# Patient Record
Sex: FEMALE | Race: WHITE | Employment: FULL TIME | URBAN - METROPOLITAN AREA
[De-identification: names, ages, dates, MRNs, and addresses within clinical notes are randomized per-mention and may not be internally consistent; named-entity substitution may affect disease eponyms.]

---

## 2023-02-10 ENCOUNTER — HOSPITAL ENCOUNTER (EMERGENCY)
Age: 53
Discharge: HOME | End: 2023-02-10
Attending: EMERGENCY MEDICINE
Payer: COMMERCIAL

## 2023-02-10 VITALS
HEIGHT: 68 IN | RESPIRATION RATE: 20 BRPM | TEMPERATURE: 97 F | OXYGEN SATURATION: 95 % | BODY MASS INDEX: 37.89 KG/M2 | HEART RATE: 80 BPM | WEIGHT: 250 LBS | SYSTOLIC BLOOD PRESSURE: 168 MMHG | DIASTOLIC BLOOD PRESSURE: 83 MMHG

## 2023-02-10 DIAGNOSIS — J01.10 ACUTE FRONTAL SINUSITIS, RECURRENCE NOT SPECIFIED: Primary | ICD-10-CM

## 2023-02-10 PROCEDURE — 99284 EMERGENCY DEPT VISIT MOD MDM: CPT

## 2023-02-10 PROCEDURE — 99283 EMERGENCY DEPT VISIT LOW MDM: CPT

## 2023-02-10 RX ORDER — AMOXICILLIN AND CLAVULANATE POTASSIUM 875; 125 MG/1; MG/1
1 TABLET, FILM COATED ORAL 2 TIMES DAILY
Qty: 20 TABLET | Refills: 0 | Status: SHIPPED | OUTPATIENT
Start: 2023-02-10 | End: 2023-02-20

## 2023-02-10 RX ORDER — METHYLPREDNISOLONE 4 MG/1
TABLET ORAL
Qty: 1 EACH | Refills: 0 | Status: SHIPPED | OUTPATIENT
Start: 2023-02-10

## 2023-02-10 NOTE — ED INITIAL ASSESSMENT (HPI)
PT to the ED for evaluation of sinus pain, pressure, congestion for the last 2 months. PT States she has now developed R ear pain as well.

## 2023-08-09 ENCOUNTER — APPOINTMENT (OUTPATIENT)
Dept: GENERAL RADIOLOGY | Age: 53
End: 2023-08-09
Attending: EMERGENCY MEDICINE
Payer: MEDICAID

## 2023-08-09 ENCOUNTER — HOSPITAL ENCOUNTER (EMERGENCY)
Age: 53
Discharge: HOME | End: 2023-08-09
Attending: EMERGENCY MEDICINE
Payer: MEDICAID

## 2023-08-09 VITALS
WEIGHT: 250 LBS | BODY MASS INDEX: 37.03 KG/M2 | HEIGHT: 69 IN | SYSTOLIC BLOOD PRESSURE: 149 MMHG | HEART RATE: 96 BPM | OXYGEN SATURATION: 97 % | DIASTOLIC BLOOD PRESSURE: 78 MMHG | TEMPERATURE: 98 F | RESPIRATION RATE: 18 BRPM

## 2023-08-09 DIAGNOSIS — M65.312 TRIGGER FINGER OF LEFT THUMB: Primary | ICD-10-CM

## 2023-08-09 PROCEDURE — 99283 EMERGENCY DEPT VISIT LOW MDM: CPT

## 2023-08-09 PROCEDURE — 99284 EMERGENCY DEPT VISIT MOD MDM: CPT

## 2023-08-09 PROCEDURE — 73130 X-RAY EXAM OF HAND: CPT | Performed by: EMERGENCY MEDICINE

## 2023-08-10 NOTE — DISCHARGE INSTRUCTIONS
Use a Velcro wrist splint follow-up with orthopedic hand surgery of next couple weeks use anti-inflammatory medicine such as Advil Aleve or topical diclofenac

## 2023-08-10 NOTE — ED INITIAL ASSESSMENT (HPI)
Patient with left thumb injury for the past \"couple of weeks\". Patient thinks it happened while carrying water. Patient describes pain that has been getting worse and feels like it is \"getting stuck\".

## 2024-01-21 ENCOUNTER — HOSPITAL ENCOUNTER (OUTPATIENT)
Facility: HOSPITAL | Age: 54
Setting detail: OBSERVATION
Discharge: HOME | End: 2024-01-24
Attending: EMERGENCY MEDICINE | Admitting: HOSPITALIST
Payer: MEDICAID

## 2024-01-21 DIAGNOSIS — N13.2 HYDRONEPHROSIS WITH URINARY OBSTRUCTION DUE TO RENAL CALCULUS: ICD-10-CM

## 2024-01-21 DIAGNOSIS — N12 PYELONEPHRITIS: Primary | ICD-10-CM

## 2024-01-21 DIAGNOSIS — N20.1 URETERAL CALCULI: ICD-10-CM

## 2024-01-21 DIAGNOSIS — D72.829 LEUKOCYTOSIS, UNSPECIFIED TYPE: ICD-10-CM

## 2024-01-21 DIAGNOSIS — N20.0 STAGHORN CALCULUS: ICD-10-CM

## 2024-01-21 RX ORDER — ACETAMINOPHEN 325 MG/1
325 TABLET ORAL EVERY 6 HOURS PRN
COMMUNITY

## 2024-01-21 RX ORDER — KETOROLAC TROMETHAMINE 15 MG/ML
15 INJECTION, SOLUTION INTRAMUSCULAR; INTRAVENOUS ONCE
Status: COMPLETED | OUTPATIENT
Start: 2024-01-21 | End: 2024-01-22

## 2024-01-21 RX ORDER — IBUPROFEN 200 MG
200 TABLET ORAL EVERY 6 HOURS PRN
COMMUNITY

## 2024-01-22 ENCOUNTER — ANESTHESIA (OUTPATIENT)
Dept: SURGERY | Facility: HOSPITAL | Age: 54
End: 2024-01-22
Payer: MEDICAID

## 2024-01-22 ENCOUNTER — APPOINTMENT (OUTPATIENT)
Dept: GENERAL RADIOLOGY | Facility: HOSPITAL | Age: 54
End: 2024-01-22
Attending: UROLOGY
Payer: MEDICAID

## 2024-01-22 ENCOUNTER — ANESTHESIA EVENT (OUTPATIENT)
Dept: SURGERY | Facility: HOSPITAL | Age: 54
End: 2024-01-22
Payer: MEDICAID

## 2024-01-22 ENCOUNTER — APPOINTMENT (OUTPATIENT)
Dept: CT IMAGING | Facility: HOSPITAL | Age: 54
End: 2024-01-22
Attending: EMERGENCY MEDICINE
Payer: MEDICAID

## 2024-01-22 PROBLEM — D72.829 LEUKOCYTOSIS, UNSPECIFIED TYPE: Status: ACTIVE | Noted: 2024-01-22

## 2024-01-22 PROBLEM — N12 PYELONEPHRITIS: Status: ACTIVE | Noted: 2024-01-22

## 2024-01-22 PROBLEM — N20.0 STAGHORN CALCULUS: Status: ACTIVE | Noted: 2024-01-22

## 2024-01-22 PROBLEM — N13.2 HYDRONEPHROSIS WITH URINARY OBSTRUCTION DUE TO RENAL CALCULUS: Status: ACTIVE | Noted: 2024-01-22

## 2024-01-22 LAB
ALBUMIN SERPL-MCNC: 2.1 G/DL (ref 3.4–5)
ALBUMIN/GLOB SERPL: 0.4 {RATIO} (ref 1–2)
ALP LIVER SERPL-CCNC: 92 U/L
ANION GAP SERPL CALC-SCNC: <0 MMOL/L (ref 0–18)
AST SERPL-CCNC: 36 U/L (ref 15–37)
BASOPHILS # BLD AUTO: 0.09 X10(3) UL (ref 0–0.2)
BASOPHILS NFR BLD AUTO: 0.6 %
BILIRUB SERPL-MCNC: 0.4 MG/DL (ref 0.1–2)
BILIRUB UR QL STRIP.AUTO: NEGATIVE
BUN BLD-MCNC: 14 MG/DL (ref 9–23)
CALCIUM BLD-MCNC: 8.8 MG/DL (ref 8.5–10.1)
CHLORIDE SERPL-SCNC: 105 MMOL/L (ref 98–112)
CO2 SERPL-SCNC: 31 MMOL/L (ref 21–32)
COLOR UR AUTO: YELLOW
CREAT BLD-MCNC: 1.09 MG/DL
EGFRCR SERPLBLD CKD-EPI 2021: 60 ML/MIN/1.73M2 (ref 60–?)
EOSINOPHIL # BLD AUTO: 0.04 X10(3) UL (ref 0–0.7)
EOSINOPHIL NFR BLD AUTO: 0.3 %
ERYTHROCYTE [DISTWIDTH] IN BLOOD BY AUTOMATED COUNT: 16 %
EST. AVERAGE GLUCOSE BLD GHB EST-MCNC: 171 MG/DL (ref 68–126)
FLUAV + FLUBV RNA SPEC NAA+PROBE: NEGATIVE
FLUAV + FLUBV RNA SPEC NAA+PROBE: NEGATIVE
GLOBULIN PLAS-MCNC: 5.7 G/DL (ref 2.8–4.4)
GLUCOSE BLD-MCNC: 127 MG/DL (ref 70–99)
GLUCOSE UR STRIP.AUTO-MCNC: NORMAL MG/DL
HBA1C MFR BLD: 7.6 % (ref ?–5.7)
HCT VFR BLD AUTO: 29.8 %
HGB BLD-MCNC: 9.7 G/DL
IMM GRANULOCYTES # BLD AUTO: 0.14 X10(3) UL (ref 0–1)
IMM GRANULOCYTES NFR BLD: 0.9 %
KETONES UR STRIP.AUTO-MCNC: NEGATIVE MG/DL
LACTATE SERPL-SCNC: 0.7 MMOL/L (ref 0.4–2)
LEUKOCYTE ESTERASE UR QL STRIP.AUTO: 500
LYMPHOCYTES # BLD AUTO: 2.07 X10(3) UL (ref 1–4)
LYMPHOCYTES NFR BLD AUTO: 13.4 %
MCH RBC QN AUTO: 27.8 PG (ref 26–34)
MCHC RBC AUTO-ENTMCNC: 32.6 G/DL (ref 31–37)
MCV RBC AUTO: 85.4 FL
MONOCYTES # BLD AUTO: 0.89 X10(3) UL (ref 0.1–1)
MONOCYTES NFR BLD AUTO: 5.8 %
NEUTROPHILS # BLD AUTO: 12.21 X10 (3) UL (ref 1.5–7.7)
NEUTROPHILS # BLD AUTO: 12.21 X10(3) UL (ref 1.5–7.7)
NEUTROPHILS NFR BLD AUTO: 79 %
OSMOLALITY SERPL CALC.SUM OF ELEC: 282 MOSM/KG (ref 275–295)
PH UR STRIP.AUTO: 6.5 [PH] (ref 5–8)
PLATELET # BLD AUTO: 577 10(3)UL (ref 150–450)
POTASSIUM SERPL-SCNC: 4.1 MMOL/L (ref 3.5–5.1)
PROT SERPL-MCNC: 7.8 G/DL (ref 6.4–8.2)
PROT UR STRIP.AUTO-MCNC: 50 MG/DL
RBC # BLD AUTO: 3.49 X10(6)UL
RBC UR QL AUTO: NEGATIVE
RSV RNA SPEC NAA+PROBE: NEGATIVE
SARS-COV-2 RNA RESP QL NAA+PROBE: NOT DETECTED
SODIUM SERPL-SCNC: 135 MMOL/L (ref 136–145)
SP GR UR STRIP.AUTO: 1.02 (ref 1–1.03)
UROBILINOGEN UR STRIP.AUTO-MCNC: 3 MG/DL
WBC # BLD AUTO: 15.4 X10(3) UL (ref 4–11)
WBC #/AREA URNS AUTO: >50 /HPF

## 2024-01-22 PROCEDURE — 0T778DZ DILATION OF LEFT URETER WITH INTRALUMINAL DEVICE, VIA NATURAL OR ARTIFICIAL OPENING ENDOSCOPIC: ICD-10-PCS | Performed by: UROLOGY

## 2024-01-22 PROCEDURE — 74177 CT ABD & PELVIS W/CONTRAST: CPT | Performed by: EMERGENCY MEDICINE

## 2024-01-22 PROCEDURE — 99223 1ST HOSP IP/OBS HIGH 75: CPT | Performed by: HOSPITALIST

## 2024-01-22 PROCEDURE — S0028 INJECTION, FAMOTIDINE, 20 MG: HCPCS | Performed by: ANESTHESIOLOGY

## 2024-01-22 DEVICE — URETERAL STENT
Type: IMPLANTABLE DEVICE | Site: URETER | Status: FUNCTIONAL
Brand: ASCERTA™

## 2024-01-22 DEVICE — URETERAL STENT
Type: IMPLANTABLE DEVICE | Site: URETER | Status: NON-FUNCTIONAL
Brand: ASCERTA™
Removed: 2024-04-15

## 2024-01-22 RX ORDER — SODIUM CHLORIDE 9 MG/ML
INJECTION, SOLUTION INTRAVENOUS CONTINUOUS
Status: DISCONTINUED | OUTPATIENT
Start: 2024-01-22 | End: 2024-01-23

## 2024-01-22 RX ORDER — ENEMA 19; 7 G/133ML; G/133ML
1 ENEMA RECTAL ONCE AS NEEDED
Status: DISCONTINUED | OUTPATIENT
Start: 2024-01-22 | End: 2024-01-24

## 2024-01-22 RX ORDER — PROCHLORPERAZINE EDISYLATE 5 MG/ML
5 INJECTION INTRAMUSCULAR; INTRAVENOUS EVERY 8 HOURS PRN
Status: DISCONTINUED | OUTPATIENT
Start: 2024-01-22 | End: 2024-01-22 | Stop reason: HOSPADM

## 2024-01-22 RX ORDER — SODIUM CHLORIDE, SODIUM LACTATE, POTASSIUM CHLORIDE, CALCIUM CHLORIDE 600; 310; 30; 20 MG/100ML; MG/100ML; MG/100ML; MG/100ML
INJECTION, SOLUTION INTRAVENOUS CONTINUOUS
Status: DISCONTINUED | OUTPATIENT
Start: 2024-01-22 | End: 2024-01-22 | Stop reason: HOSPADM

## 2024-01-22 RX ORDER — NICOTINE 21 MG/24HR
1 PATCH, TRANSDERMAL 24 HOURS TRANSDERMAL DAILY
Status: DISCONTINUED | OUTPATIENT
Start: 2024-01-22 | End: 2024-01-24

## 2024-01-22 RX ORDER — HYDROMORPHONE HYDROCHLORIDE 1 MG/ML
0.6 INJECTION, SOLUTION INTRAMUSCULAR; INTRAVENOUS; SUBCUTANEOUS EVERY 5 MIN PRN
Status: DISCONTINUED | OUTPATIENT
Start: 2024-01-22 | End: 2024-01-22 | Stop reason: HOSPADM

## 2024-01-22 RX ORDER — NALOXONE HYDROCHLORIDE 0.4 MG/ML
80 INJECTION, SOLUTION INTRAMUSCULAR; INTRAVENOUS; SUBCUTANEOUS AS NEEDED
Status: DISCONTINUED | OUTPATIENT
Start: 2024-01-22 | End: 2024-01-22 | Stop reason: HOSPADM

## 2024-01-22 RX ORDER — ONDANSETRON 2 MG/ML
4 INJECTION INTRAMUSCULAR; INTRAVENOUS EVERY 6 HOURS PRN
Status: DISCONTINUED | OUTPATIENT
Start: 2024-01-22 | End: 2024-01-24

## 2024-01-22 RX ORDER — MIDAZOLAM HYDROCHLORIDE 1 MG/ML
1 INJECTION INTRAMUSCULAR; INTRAVENOUS EVERY 5 MIN PRN
Status: DISCONTINUED | OUTPATIENT
Start: 2024-01-22 | End: 2024-01-22 | Stop reason: HOSPADM

## 2024-01-22 RX ORDER — HYDROMORPHONE HYDROCHLORIDE 1 MG/ML
0.4 INJECTION, SOLUTION INTRAMUSCULAR; INTRAVENOUS; SUBCUTANEOUS EVERY 5 MIN PRN
Status: DISCONTINUED | OUTPATIENT
Start: 2024-01-22 | End: 2024-01-22 | Stop reason: HOSPADM

## 2024-01-22 RX ORDER — HYDROMORPHONE HYDROCHLORIDE 1 MG/ML
0.2 INJECTION, SOLUTION INTRAMUSCULAR; INTRAVENOUS; SUBCUTANEOUS EVERY 5 MIN PRN
Status: DISCONTINUED | OUTPATIENT
Start: 2024-01-22 | End: 2024-01-22 | Stop reason: HOSPADM

## 2024-01-22 RX ORDER — POLYETHYLENE GLYCOL 3350 17 G/17G
17 POWDER, FOR SOLUTION ORAL DAILY PRN
Status: DISCONTINUED | OUTPATIENT
Start: 2024-01-22 | End: 2024-01-24

## 2024-01-22 RX ORDER — LIDOCAINE HYDROCHLORIDE 10 MG/ML
INJECTION, SOLUTION EPIDURAL; INFILTRATION; INTRACAUDAL; PERINEURAL AS NEEDED
Status: DISCONTINUED | OUTPATIENT
Start: 2024-01-22 | End: 2024-01-22 | Stop reason: SURG

## 2024-01-22 RX ORDER — ACETAMINOPHEN 500 MG
TABLET ORAL
Status: COMPLETED
Start: 2024-01-22 | End: 2024-01-22

## 2024-01-22 RX ORDER — BISACODYL 10 MG
10 SUPPOSITORY, RECTAL RECTAL
Status: DISCONTINUED | OUTPATIENT
Start: 2024-01-22 | End: 2024-01-24

## 2024-01-22 RX ORDER — FAMOTIDINE 10 MG/ML
INJECTION, SOLUTION INTRAVENOUS AS NEEDED
Status: DISCONTINUED | OUTPATIENT
Start: 2024-01-22 | End: 2024-01-22 | Stop reason: SURG

## 2024-01-22 RX ORDER — LABETALOL HYDROCHLORIDE 5 MG/ML
5 INJECTION, SOLUTION INTRAVENOUS EVERY 5 MIN PRN
Status: DISCONTINUED | OUTPATIENT
Start: 2024-01-22 | End: 2024-01-22 | Stop reason: HOSPADM

## 2024-01-22 RX ORDER — ACETAMINOPHEN 500 MG
500 TABLET ORAL EVERY 4 HOURS PRN
Status: DISCONTINUED | OUTPATIENT
Start: 2024-01-22 | End: 2024-01-24

## 2024-01-22 RX ORDER — TRAMADOL HYDROCHLORIDE 50 MG/1
50 TABLET ORAL EVERY 6 HOURS PRN
Status: DISCONTINUED | OUTPATIENT
Start: 2024-01-22 | End: 2024-01-24

## 2024-01-22 RX ORDER — ONDANSETRON 2 MG/ML
4 INJECTION INTRAMUSCULAR; INTRAVENOUS ONCE
Status: COMPLETED | OUTPATIENT
Start: 2024-01-22 | End: 2024-01-22

## 2024-01-22 RX ORDER — HYDROCODONE BITARTRATE AND ACETAMINOPHEN 10; 325 MG/1; MG/1
2 TABLET ORAL ONCE AS NEEDED
Status: COMPLETED | OUTPATIENT
Start: 2024-01-22 | End: 2024-01-22

## 2024-01-22 RX ORDER — KETOROLAC TROMETHAMINE 30 MG/ML
INJECTION, SOLUTION INTRAMUSCULAR; INTRAVENOUS AS NEEDED
Status: DISCONTINUED | OUTPATIENT
Start: 2024-01-22 | End: 2024-01-22 | Stop reason: SURG

## 2024-01-22 RX ORDER — HYDROCODONE BITARTRATE AND ACETAMINOPHEN 10; 325 MG/1; MG/1
1 TABLET ORAL ONCE AS NEEDED
Status: COMPLETED | OUTPATIENT
Start: 2024-01-22 | End: 2024-01-22

## 2024-01-22 RX ORDER — PROCHLORPERAZINE EDISYLATE 5 MG/ML
5 INJECTION INTRAMUSCULAR; INTRAVENOUS EVERY 8 HOURS PRN
Status: DISCONTINUED | OUTPATIENT
Start: 2024-01-22 | End: 2024-01-24

## 2024-01-22 RX ORDER — ONDANSETRON 2 MG/ML
INJECTION INTRAMUSCULAR; INTRAVENOUS AS NEEDED
Status: DISCONTINUED | OUTPATIENT
Start: 2024-01-22 | End: 2024-01-22 | Stop reason: SURG

## 2024-01-22 RX ORDER — MEPERIDINE HYDROCHLORIDE 25 MG/ML
12.5 INJECTION INTRAMUSCULAR; INTRAVENOUS; SUBCUTANEOUS AS NEEDED
Status: DISCONTINUED | OUTPATIENT
Start: 2024-01-22 | End: 2024-01-22 | Stop reason: HOSPADM

## 2024-01-22 RX ORDER — ONDANSETRON 2 MG/ML
4 INJECTION INTRAMUSCULAR; INTRAVENOUS EVERY 6 HOURS PRN
Status: DISCONTINUED | OUTPATIENT
Start: 2024-01-22 | End: 2024-01-22 | Stop reason: HOSPADM

## 2024-01-22 RX ORDER — DEXAMETHASONE SODIUM PHOSPHATE 4 MG/ML
VIAL (ML) INJECTION AS NEEDED
Status: DISCONTINUED | OUTPATIENT
Start: 2024-01-22 | End: 2024-01-22 | Stop reason: SURG

## 2024-01-22 RX ORDER — IOHEXOL 350 MG/ML
100 INJECTION, SOLUTION INTRAVENOUS
Status: COMPLETED | OUTPATIENT
Start: 2024-01-22 | End: 2024-01-22

## 2024-01-22 RX ORDER — SENNOSIDES 8.6 MG
17.2 TABLET ORAL NIGHTLY PRN
Status: DISCONTINUED | OUTPATIENT
Start: 2024-01-22 | End: 2024-01-24

## 2024-01-22 RX ORDER — ACETAMINOPHEN 500 MG
1000 TABLET ORAL ONCE AS NEEDED
Status: COMPLETED | OUTPATIENT
Start: 2024-01-22 | End: 2024-01-22

## 2024-01-22 RX ADMIN — ONDANSETRON 4 MG: 2 INJECTION INTRAMUSCULAR; INTRAVENOUS at 21:30:00

## 2024-01-22 RX ADMIN — FAMOTIDINE 20 MG: 10 INJECTION, SOLUTION INTRAVENOUS at 21:30:00

## 2024-01-22 RX ADMIN — KETOROLAC TROMETHAMINE 30 MG: 30 INJECTION, SOLUTION INTRAMUSCULAR; INTRAVENOUS at 21:57:00

## 2024-01-22 RX ADMIN — LIDOCAINE HYDROCHLORIDE 50 MG: 10 INJECTION, SOLUTION EPIDURAL; INFILTRATION; INTRACAUDAL; PERINEURAL at 21:30:00

## 2024-01-22 RX ADMIN — DEXAMETHASONE SODIUM PHOSPHATE 4 MG: 4 MG/ML VIAL (ML) INJECTION at 21:30:00

## 2024-01-22 RX ADMIN — SODIUM CHLORIDE: 9 INJECTION, SOLUTION INTRAVENOUS at 22:09:00

## 2024-01-22 NOTE — ED INITIAL ASSESSMENT (HPI)
Patient reports she has been sick for the past 3 weeks.  Thought it was a kidney stone but is now getting rigors   Pain abdominal pain and nausea with dry heaving.     decreased step length/decreased weight-shifting ability

## 2024-01-22 NOTE — CONSULTS
[facility]  Department of Urology   Consultation Note    Kirstin Myers Patient Status:  Observation    3/24/1970 MRN [id number]   Location [facility] 0SW-A Attending Paul Fagan DO   Hosp Day # 0 PCP None Pcp     Reason for Consultation:  FEVER  RIGORS   PYELONEPHRITIS  OBSTRUCTING LEFT RENAL PELVIC STONE EXTENDING UP TO LEFT UPJ    History of Present Illness:  Kirstin Myers is a a(n) 53 year old female who presented with rigors, fever, left flank pain.    Patient reports it has been occurring intermittently over the past 3 weeks.    Has not taken her temp    Labs:  Lactic acid 0.7  WBC 15.4  Hgb 9.7  Platelet count 577  Serum creat 1.09  Serum calcium level 8.8    UA 24: >50 WBC/hpf, 3-5 RBC/hpf, 3+ bacteria, few squamous epithelial cells  Urine culture 24: pending    2/2 blood cultures 24: pending    Abdominal/pelvic CT scan with IV contrast 24:  There is heterogenous appearance of the left kidney with moderate to severe hydronephrosis due to a large obstructing stone in the left renal pelvis extending up to the left UPJ measuring up to 4.3 cm. There is mild to moderate left perinephric stranding.  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  Clinical correlation recommended.  Probable cyst in mid to lower pole left kidney measuring up to 4.3 cm. There are some additional nonobstructing stones in the lower pole left kidney measuring up to 8 mm.     Urology was consulted.    No history of UTI or stone  Son has history of urolithiasis    Daughter at bedside.        History:  History reviewed. No pertinent past medical history.  Past Surgical History:   Procedure Laterality Date          HERNIA SURGERY            REPAIR ING HERNIA,5+Y/O,REDUCIBL      VENTRAL HERNIA REPAIR N/A 2016    Procedure: HERNIA VENTRAL REPAIR;   Surgeon: Vijay Randall MD;  Location:  MAIN OR     Family History   Problem Relation Age of Onset    Cancer Mother     Heart Disease Maternal Grandfather       reports that she has been smoking cigarettes. She has a 30 pack-year smoking history. She has been exposed to tobacco smoke. She does not have any smokeless tobacco history on file. She reports that she does not drink alcohol and does not use drugs.    Allergies:  No Known Allergies    Medications:    Current Facility-Administered Medications:     sodium chloride 0.9% infusion, , Intravenous, Continuous    acetaminophen (Tylenol Extra Strength) tab 500 mg, 500 mg, Oral, Q4H PRN    ondansetron (Zofran) 4 MG/2ML injection 4 mg, 4 mg, Intravenous, Q6H PRN    prochlorperazine (Compazine) 10 MG/2ML injection 5 mg, 5 mg, Intravenous, Q8H PRN    polyethylene glycol (PEG 3350) (Miralax) 17 g oral packet 17 g, 17 g, Oral, Daily PRN    sennosides (Senokot) tab 17.2 mg, 17.2 mg, Oral, Nightly PRN    bisacodyl (Dulcolax) 10 MG rectal suppository 10 mg, 10 mg, Rectal, Daily PRN    fleet enema (Fleet) 7-19 GM/118ML rectal enema 133 mL, 1 enema, Rectal, Once PRN    nicotine (Nicoderm CQ) 21 MG/24HR patch 1 patch, 1 patch, Transdermal, Daily    [START ON 1/23/2024] cefTRIAXone (Rocephin) 2 g in D5W 100 mL IVPB-ADD, 2 g, Intravenous, Q24H    Review of Systems:  Pertinent items are noted in HPI.    Physical Exam:  /57 (BP Location: Left arm)   Pulse 82   Temp 98.6 °F (37 °C) (Oral)   Resp 20   Ht 5' 8\" (1.727 m)   Wt 287 lb 6.4 oz (130.4 kg)   LMP  (LMP Unknown)   SpO2 (!) 82%   BMI 43.70 kg/m²   GENERAL: the patient is resting in bed in no acute distress.    HEENT: Unremarkable.  NECK: Supple.   LUNGS: non-labored respirations.   ABDOMEN:  The abdomen is soft and nontender without rebound or guarding.   BACK: Without CVA tenderness.       Laboratory Data:  Lab Results   Component Value Date    WBC 15.4 01/22/2024    HGB 9.7 01/22/2024    HCT 29.8 01/22/2024     .0 01/22/2024    CREATSERUM 1.09 01/22/2024    BUN 14 01/22/2024     01/22/2024    K 4.1 01/22/2024     01/22/2024    CO2 31.0 01/22/2024     01/22/2024    CA 8.8 01/22/2024    ALB 2.1 01/22/2024    ALKPHO 92 01/22/2024    BILT 0.4 01/22/2024    TP 7.8 01/22/2024    AST 36 01/22/2024    ALT  01/22/2024      Comment:      Due to  backorder we are temporarily unable to offer hospital-based ALT testing at [facility].   If urgently needed, please order ALT test code [id number].   The new order will need a new venipuncture and will be sent to [facility] for testing.   The expected turnaround time will be within 24 hours.        UA 1/22/24: >50 WBC/hpf, 3-5 RBC/hpf, 3+ bacteria, few squamous epithelial cells  Urine culture 1/22/24: pending    2/2 blood cultures 1/22/24: pending     Imaging:  CT ABDOMEN PELVIS IV CONTRAST, NO ORAL (ER)    Result Date: 1/22/2024  CONCLUSION:   1. There is heterogenous appearance of the left kidney with moderate to severe hydronephrosis due to a large obstructing stone in the left renal pelvis extending up to the left UPJ measuring up to 4.3 cm. There is mild to moderate left perinephric stranding.  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  Clinical correlation recommended.   2. Enlarged retroperitoneal lymph nodes measuring up to 3.5 x 1.7 cm in the left periaortic region that may be reactive, with other etiologies not entirely excluded.  Clinical correlation recommended.  PET-CT may also be of further value.  3. Decompressed urinary bladder, with superimposed cystitis not entirely excluded.  Clinical correlation recommended.   4. Additional left nephrolithiasis.  5. Uncomplicated colonic diverticulosis.  6. Hepatomegaly.   Please see above for further details.  A preliminary report was provided by the Scaled Inference service.   LOCATION:  [id number]   Dictated by (CST): Chuck Cast MD on 1/22/2024 at 5:06 AM     Finalized by (CST): Chuck Cast MD on 1/22/2024 at 5:12 AM       Impression:  Patient Active Problem List   Diagnosis    Ventral hernia without obstruction or gangrene    Incarcerated ventral hernia    Pyelonephritis    Leukocytosis, unspecified type    Staghorn calculus    Hydronephrosis with urinary obstruction due to renal calculus     LEFT FLANK PAIN, PYELONEPHRITIS, OBSTRUCTING LEFT RENAL PELVIC STONE EXTENDING UP TO LEFT UPJ (4.3 CM), LEFT UROLITHIASIS   Tmax 99.9 on 1/21, Tmax 99.6 so far today  Lactic acid 0.7  WBC 15.4  Hgb 9.7  Platelet count 577  Serum creat 1.09  Serum calcium level 8.8  UA 1/22/24: >50 WBC/hpf, 3-5 RBC/hpf, 3+ bacteria, few squamous epithelial cells  Urine culture 1/22/24: pending  2/2 blood cultures 1/22/24: pending  CT A/P with IV contrast 1/22/24:  There is heterogenous appearance of the left kidney with moderate to severe hydronephrosis due to a large obstructing stone in the left renal pelvis extending up to the left UPJ measuring up to 4.3 cm. There is mild to moderate left perinephric    stranding.  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  Clinical correlation recommended.  Probable cyst in mid to lower pole left kidney measuring up to 4.3 cm.  There are some additional nonobstructing stones in the lower pole left kidney measuring up to 8 mm.     Recommendations:  Recommend a cystoscopy, left retrograde pyelogram, and insertion of a left ureteral stent. Reviewed risks, benefits, alternatives, and complications of the procedure including but not limited to risk of anesthesia, bladder/ureteral injury, use of nephrostomy tube, bleeding, infection/worsening infection, and ureteral stent related symptoms with the patient who understands and wishes to proceed.  Patient  informed if a ureteral stent was unable to be placed, would recommend a NT placement.  Patient also informed that the ureteral stent is not a permament implant and would eventually need to be removed (timing of stent removal per urologist).    Outpatient renal scan to see how much function the left kidney has.  If renal scan ok - then will need a left PCNL.  If little function then will likely  need a nephrectomy.  Dr. Pena reviewed CT scan - looks like the left kidney still has some good parenchyma.      NPO  Consent to be signed  Check final cultures  Abx per attending  Follow labs, temp  Pain management    Above discussed with patient, daughter, nurse, Dr. Pena.      Thank you for allowing me to participate in the care of your patient.    Catherine Mack PA-C  [facility]  Department of Urology  1/22/2024  9:42 AM

## 2024-01-22 NOTE — ED QUICK NOTES
Orders for admission, patient is aware of plan and ready to go upstairs. Any questions, please call ED RN Tania at extension [phone].     Patient Covid vaccination status: Unvaccinated     COVID Test Ordered in ED: SARS-CoV-2/Flu A and B/RSV by PCR (GeneXpert)    COVID Suspicion at Admission: N/A    Running Infusions:  None    Mental Status/LOC at time of transport: A/O x 4    Other pertinent information:   CIWA score: N/A   NIH score:  N/A

## 2024-01-22 NOTE — PLAN OF CARE
NURSING ADMISSION NOTE      Patient admitted via Wheelchair  Oriented to room.  Safety precautions initiated.  Bed in low position.  Call light in reach.  Received patient from ED via w/c with daughter at bedside. On room air, breath sounds clear. Placed on tele, SR. Noted to have +2 pitting edema in R foot, pt states since fall 1 week ago. Medicated with Tylenol for c/o neck pain. Urology consult to be called. NPO per Dr Lawson for now.

## 2024-01-22 NOTE — H&P
[facility]     Kirstin Myers Patient Status:  Observation    3/24/1970 MRN [id number]   Location [facility] 0SW-A Attending Liane Lawson MD   Hosp Day # 0 PCP None Pcp     Chief Complaint: rigors and fever    Subjective:    History of Present Illness:     Kirstin Myers is a 53 year old female with no known medical history who comes to the ER with complaints of rigors, fevers and left sided flank pain.  She reports it has been intermittently occurring over the past 3 weeks.  Over the past few days it has been more constant.  She did not take her temperature at home but reports she had subjective fevers.  Her pain initially started in her left flank and then started to radiate around to the left side of her abdomen.      History/Other:    Past Medical History:  History reviewed. No pertinent past medical history.  Past Surgical History:   Past Surgical History:   Procedure Laterality Date          HERNIA SURGERY            REPAIR ING HERNIA,5+Y/O,REDUCIBL      VENTRAL HERNIA REPAIR N/A 2016    Procedure: HERNIA VENTRAL REPAIR;  Surgeon: Vijay Randall MD;  Location:  MAIN OR      Family History:   Family History   Problem Relation Age of Onset    Cancer Mother     Heart Disease Maternal Grandfather      Social History:    reports that she has been smoking cigarettes. She has a 30 pack-year smoking history. She has been exposed to tobacco smoke. She does not have any smokeless tobacco history on file. She reports that she does not drink alcohol and does not use drugs.     Allergies: No Known Allergies    Medications:    No current facility-administered medications on file prior to encounter.     Current Outpatient Medications on File Prior to Encounter   Medication Sig Dispense Refill    acetaminophen 325 MG Oral Tab Take 1 tablet (325 mg total) by mouth every 6 (six) hours as needed for Pain.      ibuprofen 200 MG Oral Tab Take 1 tablet (200 mg  total) by mouth every 6 (six) hours as needed for Pain.      Naproxen Sodium (ALEVE OR) Take by mouth.      cetirizine 10 MG Oral Tab Take 1 tablet (10 mg total) by mouth daily.      B Complex Vitamins (B COMPLEX OR) Take by mouth daily.      BIOTIN OR Take by mouth daily.      COLLAGEN OR Take by mouth daily.      Multiple Vitamins-Minerals (MULTIVITAMIN ADULT OR) Take by mouth daily.      Bioflavonoid Products (LISA C OR) Take by mouth daily.      LYSINE OR Take by mouth daily.      Misc Natural Products (OSTEO BI-FLEX ADV JOINT SHIELD) Oral Tab Take by mouth daily.      Digestive Enzymes (PAPAYA AND ENZYMES OR) Take by mouth daily.      Probiotic Product (PROBIOTIC DAILY OR) Take by mouth daily.      methylPREDNISolone (MEDROL) 4 MG Oral Tablet Therapy Pack Dosepack: take as directed 1 each 0    HYDROcodone-acetaminophen 5-325 MG Oral Tab Take 1 tablet by mouth every 4 (four) hours as needed. 30 tablet 0    Fluticasone Propionate 50 MCG/ACT Nasal Suspension 1 spray by Each Nare route daily.      Bromelains (BROMELAIN OR) Take by mouth daily.      CHROMIUM OR Take by mouth daily.      Coenzyme Q10 (COQ10 OR) Take by mouth.      GLUTAMINE OR Take by mouth daily.      Green Tea, Camillia sinensis, (GREEN TEA OR) Take by mouth daily.      Omega-3 Fatty Acids (OMEGA-3 FISH OIL OR) Take by mouth daily.         Review of Systems:   A comprehensive review of systems was completed.    Pertinent positives and negatives noted in the HPI.    Objective:   Physical Exam:    /58 (BP Location: Left arm)   Pulse 77   Temp 99.6 °F (37.6 °C) (Oral)   Resp 22   Ht 5' 8\" (1.727 m)   Wt 287 lb 6.4 oz (130.4 kg)   LMP  (LMP Unknown)   SpO2 94%   BMI 43.70 kg/m²   General: No acute distress, Alert  Respiratory: No rhonchi, no wheezes  Cardiovascular: S1, S2. Regular rate and rhythm  Abdomen: Soft, Non-tender, non-distended, positive bowel sounds  Neuro: No new focal deficits  Extremities: No edema      Results:     Labs:      Labs Last 24 Hours:    Recent Labs   Lab 01/22/24  0001   RBC 3.49*   HGB 9.7*   HCT 29.8*   MCV 85.4   MCH 27.8   MCHC 32.6   RDW 16.0   NEPRELIM 12.21*   WBC 15.4*   .0*       Recent Labs   Lab 01/22/24  0001   *   BUN 14   CREATSERUM 1.09*   EGFRCR 60   CA 8.8   ALB 2.1*   *   K 4.1      CO2 31.0   ALKPHO 92   AST 36   BILT 0.4   TP 7.8       No results found for: \"PT\", \"INR\"    No results for input(s): \"TROP\", \"TROPHS\", \"CK\" in the last 168 hours.    No results for input(s): \"TROP\", \"PBNP\" in the last 168 hours.    No results for input(s): \"PCT\" in the last 168 hours.    Imaging: Imaging data reviewed in Epic.    Assessment & Plan:      #Staghorn calculus  # Acute pyelonephritis  -Abx  -await Cx results  -urology consult    # Hyponatremia  -IVF    # Normocytic anemia  -check Iron studies    # Obesity  -BMI 43    # Tobacco abuse  -Nicotine patch    # Renal insuff  -baseline unknown        Plan of care discussed with patient and daughter, RN and ER physician    Liane Lawson MD    Supplementary Documentation:     The 21st Century Cures Act makes medical notes like these available to patients in the interest of transparency. Please be advised this is a medical document. Medical documents are intended to carry relevant information, facts as evident, and the clinical opinion of the practitioner. The medical note is intended as peer to peer communication and may appear blunt or direct. It is written in medical language and may contain abbreviations or verbiage that are unfamiliar.

## 2024-01-22 NOTE — ED PROVIDER NOTES
Patient Seen in: [facility] Emergency Department      History     Chief Complaint   Patient presents with    Fever     Stated Complaint: couple weeks having rigors, concerned for infection, having fevers    Subjective:   HPI    Patient is a 53-year-old female who states has been sick for the past 3 weeks.  Patient states started having some back pain on the left and got some rigors.  However it went away after few days.  But states started having some diffuse abdominal pain nausea dry heaving.  States also has been going the bathroom more and having low-grade chills.  Thought initially could be a kidney stone but when the pain went away he thought she was better but then the fever started.    Objective:   History reviewed. No pertinent past medical history.           Past Surgical History:   Procedure Laterality Date          HERNIA SURGERY            REPAIR ING HERNIA,5+Y/O,REDUCIBL      VENTRAL HERNIA REPAIR N/A 2016    Procedure: HERNIA VENTRAL REPAIR;  Surgeon: Vijay Randall MD;  Location:  MAIN OR                Social History     Socioeconomic History    Marital status:    Tobacco Use    Smoking status: Every Day     Packs/day: 1.00     Years: 30.00     Additional pack years: 0.00     Total pack years: 30.00     Types: Cigarettes     Passive exposure: Current   Vaping Use    Vaping Use: Some days   Substance and Sexual Activity    Alcohol use: No     Alcohol/week: 0.0 standard drinks of alcohol    Drug use: No              Review of Systems    Positive for stated complaint: couple weeks having rigors, concerned for infection, having fevers  Other systems are as noted in HPI.  Constitutional and vital signs reviewed.      All other systems reviewed and negative except as noted above.    Physical Exam     ED Triage Vitals [24 2335]   /78   Pulse 92   Resp 24   Temp 99.9 °F (37.7 °C)   Temp src Oral   SpO2 92 %   O2 Device None (Room air)       Current:/78    Pulse 92   Temp 99.3 °F (37.4 °C) (Oral)   Resp 24   Ht 172.7 cm (5' 8\")   Wt 127 kg   LMP  (LMP Unknown)   SpO2 92%   BMI 42.57 kg/m²         Physical Exam      Vital signs reviewed  General appearance: Patient is alert and in no acute distress  HEENT: Pupils equal react to light extraocular muscles intact no scleral icterus, mucous membranes are moist, there is no erythema or exudate in the posterior pharynx  Neck: Supple no JVD no lymphadenopathy no meningismus no carotid bruit  CV: Regular rate and rhythm no murmur rub  Respiratory: Clear to auscultation bilaterally no crackles no wheezes no accessory muscle use  Abdomen: Diffuse abdominal tenderness, no rebound no guarding  no hepatosplenomegaly bowel sounds are present , no pulsatile mass  Extremities: No clubbing cyanosis or edema 2+ distal pulses.  Neuro: Cranial nerves II through XII intact with no gross focal sensory or motor abnormality.      ED Course     Labs Reviewed   COMP METABOLIC PANEL (14) - Abnormal; Notable for the following components:       Result Value    Glucose 127 (*)     Sodium 135 (*)     Anion Gap <0 (*)     Creatinine 1.09 (*)     Albumin 2.1 (*)     Globulin  5.7 (*)     A/G Ratio 0.4 (*)     All other components within normal limits   URINALYSIS WITH CULTURE REFLEX - Abnormal; Notable for the following components:    Clarity Urine Turbid (*)     Protein Urine 50 (*)     Urobilinogen Urine 3 (*)     Nitrite Urine 2+ (*)     Leukocyte Esterase Urine 500 (*)     WBC Urine >50 (*)     RBC Urine 3-5 (*)     Bacteria Urine 3+ (*)     Squamous Epi. Cells Few (*)     All other components within normal limits   CBC W/ DIFFERENTIAL - Abnormal; Notable for the following components:    WBC 15.4 (*)     RBC 3.49 (*)     HGB 9.7 (*)     HCT 29.8 (*)     .0 (*)     Neutrophil Absolute Prelim 12.21 (*)     Neutrophil Absolute 12.21 (*)     All other components within normal limits   SARS-COV-2/FLU A AND B/RSV BY PCR (GENEXPERT) -  Normal    Narrative:     This test is intended for the qualitative detection and differentiation of SARS-CoV-2, influenza A, influenza B, and respiratory syncytial virus (RSV) viral RNA in nasopharyngeal or nares swabs from individuals suspected of respiratory viral infection consistent with COVID-19 by their healthcare provider. Signs and symptoms of respiratory viral infection due to SARS-CoV-2, influenza, and RSV can be similar.    Test performed using the Xpert Xpress SARS-CoV-2/FLU/RSV (real time RT-PCR)  assay on the [employer] instrument, [employer], [place] [address].   This test is being used under the Food and Drug Administration's Emergency Use Authorization.    The authorized Fact Sheet for Healthcare Providers for this assay is available upon request from the laboratory.   CBC WITH DIFFERENTIAL WITH PLATELET    Narrative:     The following orders were created for panel order CBC With Differential With Platelet.  Procedure                               Abnormality         Status                     ---------                               -----------         ------                     CBC W/ DIFFERENTIAL[[id number]]          Abnormal            Final result                 Please view results for these tests on the individual orders.   LACTIC ACID, PLASMA   RAINBOW DRAW LAVENDER   RAINBOW DRAW LIGHT GREEN   RAINBOW DRAW BLUE   URINE CULTURE, ROUTINE   BLOOD CULTURE   BLOOD CULTURE             Patient had a CBC chemistry COVID flu RSV urinalysis done.  Patient's urinalysis did show bacteria leukoesterase and nitrates.  Do feel she may have pyelonephritis.  She was given a gram of IV Rocephin and will get a CT scan.    CT ABDOMEN AND PELVIS WITH IV CONTRAST  Comparison: None available for review.    IMPRESSION:  Amorphous density in the left renal pelvis and proximal ureter is suggestive of a staghorn calculus, which measures approximately 2.0 x 4.3 cm.  There is associated marked hydronephrosis involving the  left kidney with thinning of the renal cortex.  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  Urologic consultation is suggested.  Nonspecific appearance of the partially decompressed urinary bladder, which appears thick-walled.  Correlation with urinalysis is suggested, particularly given the findings described above.    CT scan showed a density in the left renal pelvis and proximal ureter suggestive of a staghorn calculus there is associated marked hydronephrosis involving the left kidney overall findings are excessive process that is causing pyelonephritis and blockage.  I do feel based on these findings patient will need to be admitted.  I will see if the hospitalist would like me to call urology now or wait till morning.       Patient was seen immediately upon arrival due to a high probability of sudden, clinically significant, or life threatening deterioration of the patient's clinical status.  The patient required my time at the bedside performing direct personal management, the absence of which would likely result in sudden, clinically significant or life threatening deterioration of  clinical condition.   The patient required my constant attention. Time was spent ordering, reviewing, and interpreting ancillary testing and imaging. The patient was frequently reevaluated, and I made frequent checks of  vital signs and monitor. Nursing notes were reviewed.     Critical care time was[60 minutes], and exclusive of procedures.  MDM      Differential diagnosis reflecting the complexity of care include: Pyelonephritis, kidney stone, renal colic, staghorn calculus, hydronephrosis      Discussions of management was done with: Hospitalist was contacted and agrees with plan they will call the urologist in the morning    My independent  interpretation of studies of: CT scan was quite impressive both kidneys have some stranding around it and does have a staghorn calculus causing hydronephrosis and thinning cortex on the left        Shared decision making was done by self and patient and hospitalist.  Patient will be admitted for pain control IV fluids and IV antibiotics.  I did send a lactic acid and blood cultures after get the CT scan.  However patient does not appear septic    Patient was evaluated in the emergency department and at this point patient will need admission for further management of medical condition.  Patient was stable in the emergency department and will be transferred to floor for further definitive care.  Patient's questions were answered.    This note was prepared using Dragon Medical voice recognition dictation software. As a result errors may occur. When identified these errors have been corrected. While every attempt is made to correct errors during dictation discrepancies may still exist      Admission disposition: 1/22/2024  3:15 AM                                        Medical Decision Making      Disposition and Plan     Clinical Impression:  1. Pyelonephritis    2. Leukocytosis, unspecified type    3. Staghorn calculus    4. Hydronephrosis with urinary obstruction due to renal calculus         Disposition:  Admit  1/22/2024  3:15 am    Follow-up:  No follow-up provider specified.        Medications Prescribed:  Current Discharge Medication List                            Hospital Problems       Present on Admission  Date Reviewed: 7/12/2016            ICD-10-CM Noted POA    * (Principal) Pyelonephritis N12 1/22/2024 Unknown

## 2024-01-22 NOTE — PROGRESS NOTES
[facility] Note: Antimicrobial Weight Based Dose Adjustment for: ceftriaxone (ROCEPHIN)    Kirstin Myers is a 53 year old patient who has been prescribed ceftriaxone (ROCEPHIN) 1000 mg every 24 hours.    Estimated Creatinine Clearance: 58.8 mL/min (A) (based on SCr of 1.09 mg/dL (H)).  Wt Readings from Last 6 Encounters:   01/22/24 130.4 kg (287 lb 6.4 oz)   08/09/23 113.4 kg (250 lb)   02/10/23 113.4 kg (250 lb)   12/30/16 90.7 kg (200 lb)   07/12/16 125.6 kg (277 lb)   05/25/16 127.9 kg (282 lb)     Body mass index is 43.7 kg/m².    Based on this criteria and renal function, dose will be adjusted to ceftriaxone (ROCEPHIN) 2000 mg every 24 hours.    Thank you,    Guido Naranjo, LTAC, located within [facility]  1/22/2024  4:35 AM

## 2024-01-23 LAB
ANION GAP SERPL CALC-SCNC: 3 MMOL/L (ref 0–18)
BASOPHILS # BLD AUTO: 0.06 X10(3) UL (ref 0–0.2)
BASOPHILS NFR BLD AUTO: 0.4 %
BUN BLD-MCNC: 19 MG/DL (ref 9–23)
CALCIUM BLD-MCNC: 8.6 MG/DL (ref 8.5–10.1)
CHLORIDE SERPL-SCNC: 107 MMOL/L (ref 98–112)
CO2 SERPL-SCNC: 27 MMOL/L (ref 21–32)
CREAT BLD-MCNC: 1.25 MG/DL
EGFRCR SERPLBLD CKD-EPI 2021: 51 ML/MIN/1.73M2 (ref 60–?)
EOSINOPHIL # BLD AUTO: 0 X10(3) UL (ref 0–0.7)
EOSINOPHIL NFR BLD AUTO: 0 %
ERYTHROCYTE [DISTWIDTH] IN BLOOD BY AUTOMATED COUNT: 15.9 %
GLUCOSE BLD-MCNC: 176 MG/DL (ref 70–99)
HCT VFR BLD AUTO: 29 %
HGB BLD-MCNC: 9.1 G/DL
IMM GRANULOCYTES # BLD AUTO: 0.15 X10(3) UL (ref 0–1)
IMM GRANULOCYTES NFR BLD: 0.9 %
LYMPHOCYTES # BLD AUTO: 1.33 X10(3) UL (ref 1–4)
LYMPHOCYTES NFR BLD AUTO: 8.3 %
MCH RBC QN AUTO: 27.6 PG (ref 26–34)
MCHC RBC AUTO-ENTMCNC: 31.4 G/DL (ref 31–37)
MCV RBC AUTO: 87.9 FL
MONOCYTES # BLD AUTO: 0.54 X10(3) UL (ref 0.1–1)
MONOCYTES NFR BLD AUTO: 3.4 %
NEUTROPHILS # BLD AUTO: 13.88 X10 (3) UL (ref 1.5–7.7)
NEUTROPHILS # BLD AUTO: 13.88 X10(3) UL (ref 1.5–7.7)
NEUTROPHILS NFR BLD AUTO: 87 %
OSMOLALITY SERPL CALC.SUM OF ELEC: 291 MOSM/KG (ref 275–295)
PLATELET # BLD AUTO: 582 10(3)UL (ref 150–450)
POTASSIUM SERPL-SCNC: 4.6 MMOL/L (ref 3.5–5.1)
RBC # BLD AUTO: 3.3 X10(6)UL
SODIUM SERPL-SCNC: 137 MMOL/L (ref 136–145)
WBC # BLD AUTO: 16 X10(3) UL (ref 4–11)

## 2024-01-23 PROCEDURE — 99232 SBSQ HOSP IP/OBS MODERATE 35: CPT | Performed by: HOSPITALIST

## 2024-01-23 NOTE — PLAN OF CARE
Patient A&O x4, room air, no c/o pain at this time.  Patient returned from cysto with stent placement, no string.  Patient only complaint at this time is urinary frequency.  She feels that she constantly has to go but only very little comes out.  Patient currently on Rocephin and 0.9 @100.

## 2024-01-23 NOTE — PROGRESS NOTES
[facility]     Hospitalist Progress Note     Kirstin Myers Patient Status:  Observation    3/24/1970 MRN [id number]   Location [facility] 0SW-A Attending Paul Fagan DO   Hosp Day # 0 PCP None Pcp     Chief Complaint: Fever    Subjective:     Patient seen and examined. Denies fever/chills. Reports urinary urgency. Denies N/V.     Objective:    Review of Systems:   A comprehensive review of systems was completed; pertinent positive and negatives stated in subjective.    Vital signs:  Temp:  [97.7 °F (36.5 °C)-101.7 °F (38.7 °C)] 99.4 °F (37.4 °C)  Pulse:  [] 74  Resp:  [16-24] 18  BP: (114-147)/(57-89) 121/65  SpO2:  [90 %-100 %] 99 %    Physical Exam:    General: No acute distress. Morbidly obese.   Respiratory: No wheezes, no rhonchi  Cardiovascular: S1, S2, regular rate and rhythm  Abdomen: Soft, Non-tender, non-distended, positive bowel sounds  Neuro: No new focal deficits.   Extremities: No edema    Diagnostic Data:    Labs:  Recent Labs   Lab 24  0001 24  0905   WBC 15.4* 16.0*   HGB 9.7* 9.1*   MCV 85.4 87.9   .0* 582.0*       Recent Labs   Lab 24  0001 24  0905   * 176*   BUN 14 19   CREATSERUM 1.09* 1.25*   CA 8.8 8.6   ALB 2.1*  --    * 137   K 4.1 4.6    107   CO2 31.0 27.0   ALKPHO 92  --    AST 36  --    BILT 0.4  --    TP 7.8  --        Estimated Creatinine Clearance: 51.3 mL/min (A) (based on SCr of 1.25 mg/dL (H)).    No results for input(s): \"TROP\", \"TROPHS\", \"CK\" in the last 168 hours.    No results for input(s): \"PTP\", \"INR\" in the last 168 hours.               Microbiology    Hospital Encounter on 24   1. Blood Culture     Status: None (Preliminary result)    Collection Time: 24  3:25 AM    Specimen: Blood,peripheral   Result Value Ref Range    Blood Culture Result No Growth 1 Day N/A   2. Urine Culture, Routine     Status: Abnormal (Preliminary result)    Collection Time: 24 12:50 AM    Specimen: Urine,  clean catch   Result Value Ref Range    Urine Culture >100,000 CFU/ML Escherichia coli (A) N/A         Imaging: Reviewed in Epic.    Medications:    nicotine  1 patch Transdermal Daily    cefTRIAXone  2 g Intravenous Q24H       Assessment & Plan:      #Staghorn calculus s/p cystoscopy with left ureteral stent placement 1/22  -Definitive stone intervention and renal scan as OP per urology    # Acute pyelonephritis  -Abx  -Culture with E. Coli, pending sensitivity      # Hyponatremia  -Resolved     # Normocytic anemia  -Monitor     # Morbid obesity  -BMI 43     # Tobacco abuse  -Nicotine patch     # Renal insuff  -Baseline unknown  -Monitor    #Disposition  -Dc planning pending urine sensitivity results     Paul Fagan DO    Supplementary Documentation:     Quality:  DVT Mechanical Prophylaxis:   SCDs,    DVT Pharmacologic Prophylaxis   Medication   None                Code Status: Prior  Malave: No urinary catheter in place  Malave Duration (in days):   Central line:    ALIX: 1/23/2024    Discharge is dependent on: clinical progress  At this point Ms. Myers is expected to be discharge to: home    The 21st Century Cures Act makes medical notes like these available to patients in the interest of transparency. Please be advised this is a medical document. Medical documents are intended to carry relevant information, facts as evident, and the clinical opinion of the practitioner. The medical note is intended as peer to peer communication and may appear blunt or direct. It is written in medical language and may contain abbreviations or verbiage that are unfamiliar.

## 2024-01-23 NOTE — ANESTHESIA PROCEDURE NOTES
Airway  Date/Time: 1/22/2024 9:37 PM  Urgency: elective      General Information and Staff    Patient location during procedure: OR  Anesthesiologist: Mike Levin MD  Performed: anesthesiologist   Performed by: Mike Levin MD  Authorized by: Mike Levin MD      Indications and Patient Condition  Indications for airway management: anesthesia  Sedation level: deep  Preoxygenated: yes  Patient position: sniffing  Mask difficulty assessment: 1 - vent by mask    Final Airway Details  Final airway type: supraglottic airway      Successful airway: classic  Size 3       Number of attempts at approach: 1

## 2024-01-23 NOTE — DISCHARGE INSTRUCTIONS
You will need to find a urologist who accepts insurance and see that person as soon as they can do so that arrangements for stone treatment, probably PCNL, can be made.  The stent is not a permanent implant and does need to be removed - may need to be changed or removed before 3 months.     Having a Ureteral Stent  A ureteral stent is a soft plastic tube with holes in it. It’s temporarily inserted into a ureter to help drain urine into the bladder. One end goes in the kidney. The other end goes in the bladder. A coil on each end holds the stent in place. The stent can’t be seen from outside the body. It shouldn’t interfere with your normal routine. Your stent will be put in by a doctor trained in treating the urinary tract (a urologist) or another specialist. The procedure is done in a hospital or surgery center. You’ll likely go home the same day.   When is a ureteral stent used?  A ureteral stent may be used:  To bypass a blockage in a kidney or ureter.  During kidney stone removal.  To let a ureter heal after surgery.    Before the procedure  Your healthcare provider will give you instructions to prepare for the procedure. X-rays or other imaging tests of your kidneys and ureters may be done beforehand.   During the procedure  You receive medicine to prevent pain and help you relax or sleep during the procedure. Once this takes effect, the procedure starts.  The doctor inserts a cystoscope (lighted instrument) through the urethra and into the bladder. This shows the opening to the ureter.  A thin wire is carefully threaded through the cystoscope, up the ureter, and into the kidney. The stent is inserted over the wire.  A fluoroscope (special X-ray machine) is used to help position the stent. When the stent is in place, the wire and cystoscope are removed.     While you have a stent  Some discomfort is normal. Certain movements may trigger pain or a feeling that you need to urinate. You may also feel mild soreness  or pressure before or during urination. These symptoms will go away a few days after the stent is removed.  Medicine to control pain or bladder spasms or to prevent infection may be prescribed. Take this as directed.  Drink plenty of fluids to help flush out your urinary tract.  Your urine may be slightly pink or red. This is due to bleeding caused by minor irritation from the stent. This may happen on and off while you have the stent.  As with any synthetic device placed in the body, there is a risk of infection. The stent may have to be removed if this happens.      How long will you need a stent?  The stent is often taken out after the blockage in the ureter is treated or the ureter has healed. This may take 1 week to 2 weeks, or longer. If a stent is needed for a long time, it may need to be changed every few months.   When to call your healthcare provider  Contact your healthcare provider right away if:  Your urine contains blood clots or you see a large amount of blood-tinged urine  You have symptoms similar to those you had before the stent was placed  You constantly leak urine  You have a fever over 100.4°F (38°C), or as advised by your health care provider  You have chills  You experience nausea or vomiting  Your pain is not relieved with medicine  The end of the stent comes out of the urethra  You experience new or worsening symptoms  [employer] last reviewed this educational content on 4/1/2020 © 2000-2020 [employer]. [address] [place] [address]. All rights reserved. This information is not intended as a substitute for professional medical care. Always follow your healthcare professional's instructions.      Nicoderm CQ 21mg/hr

## 2024-01-23 NOTE — PLAN OF CARE
Pt a/o x4. VSS, remained afebrile. Meds given per MAR. IVF discontinued per order. C/o urinary frequency. Denied pain or N/V. Call light within reach.     Problem: GENITOURINARY  Goal: Maintain optimal urinary function  Description: Interventions:  - Assess ability to void  - Assess for bladder distention  - Monitor creatinine and BUN  - Monitor intake and output  - Insert urinary catheter as ordered  - Obtain appropriate imaging as ordered  Outcome: Progressing

## 2024-01-23 NOTE — OPERATIVE REPORT
[facility]   OPERATIVE REPORT    Kirstin Myers Patient Status:  Observation    3/24/1970 MRN [id number]   Location [facility] PRE OP HOLDING Attending Paul Fagan DO   Hosp Day # 0 PCP None Pcp        DATE OF OPERATION;  2024      PREOPERATIVE DIAGNOSIS: Left renal calculus.    POSTOPERATIVE DIAGNOSIS: Same    SURGEON: CHANTE ORTIZ M.D.    PROCEDURE PERFORMED: Cystoscopy, Left Retrograde Pyelogram, and Placement of Ureteral Stent    ANESTHESIA:  General.     INDICATIONS:   This patient has been having intermittent left flank pain for a few months prior to admission and then presented to the emergency room with fevers and chills.  CT scan shows a 3 x 4 cm left UPJ branching renal calculus with severe hydronephrosis and thin cortex of the upper pole calyces.  There is some reasonably well-preserved parenchyma in the lower portion of the kidney.  3+ bacteriuria.  Ureteral stent was recommended to decompress the kidney    FINDINGS: Pus coming from the left kidney.  I sent a specimen of pus from the renal pelvis down for culture, in case voided sample previously collected is equivocal.  Await culture results.  She will need to follow-up sometime in the next 2 to 3 weeks with a urologist on her Medicaid HMO panel to begin to arrange stone treatment.  I recommended to the patient and her daughter that they might want to consider a renal scan to evaluate the function of the left kidney to decide whether left nephrectomy or PCNL would make sense for her.     EBL: None    COMPLICATIONS: None     TECHNIQUE:  A general anesthesia was induced.  A time-out was  reviewed.  Preoperative antibiotics were administered.  The patient  was prepped and draped in the dorsal lithotomy position.  Sequential  compression devices were in place on the lower legs.     The cystoscope was passed into the bladder and the bladder was  examined.  There is no evidence of acute inflammation in the bladder.  The ureteral orifices  were in normal position.  The cystoscope and floroscopy  were used to guide a guidewire into the left  ureteral orifice and water soluble contrast was injected retrograde.  I could see the contrast going around a filling defect which looked like the stone in the area of the renal pelvis, and some large calyces filled with contrast but I think the pus in the kidney limited upper tract evaluation.  A ureteral catheter was advanced over the guidewire and then 1 cc of grossly purulent, thick debris was irrigated from the renal pelvis and sent for analysis.        A  6-Amharic 26-cm  stent was then passed over a guidewire up into the ureter.  The  stent was in good position when the guidewire was removed.purulent urine was streaming through the stent after the guidewire was removed.  The patient was awakened, and taken to the recovery area in good condition.     [facility]  [phone]  1/22/2024  9:27 PM

## 2024-01-23 NOTE — ANESTHESIA PREPROCEDURE EVALUATION
PRE-OP EVALUATION    Patient Name: Kirstin Myers    Admit Diagnosis: Staghorn calculus [N20.0]  Pyelonephritis [N12]  Hydronephrosis with urinary obstruction due to renal calculus [N13.2]  Leukocytosis, unspecified type [D72.829]    Pre-op Diagnosis: Ureteral calculi [N20.1]    CYSTOSCOPY, LEFT RETROGRADE, PYELOGRAM, INSERTION OF LEFT URETERAL STENT    Anesthesia Procedure: CYSTOSCOPY, LEFT RETROGRADE, PYELOGRAM, INSERTION OF LEFT URETERAL STENT (Left)    Surgeon(s) and Role:     * Jason Pena MD - Primary    Pre-op vitals reviewed.  Temp: 99.1 °F (37.3 °C)  Pulse: 86  Resp: 18  BP: 130/57  SpO2: 100 %  Body mass index is 43.7 kg/m².    Current medications reviewed.  Hospital Medications:   [COMPLETED] ondansetron (Zofran) 4 MG/2ML injection 4 mg  4 mg Intravenous Once    [COMPLETED] cefTRIAXone (Rocephin) 2 g in D5W 100 mL IVPB-ADD  2 g Intravenous Once    [COMPLETED] iohexol (Omnipaque) 350 MG/ML injection via power injector 100 mL  100 mL Intravenous ONCE PRN    sodium chloride 0.9% infusion   Intravenous Continuous    acetaminophen (Tylenol Extra Strength) tab 500 mg  500 mg Oral Q4H PRN    ondansetron (Zofran) 4 MG/2ML injection 4 mg  4 mg Intravenous Q6H PRN    prochlorperazine (Compazine) 10 MG/2ML injection 5 mg  5 mg Intravenous Q8H PRN    polyethylene glycol (PEG 3350) (Miralax) 17 g oral packet 17 g  17 g Oral Daily PRN    sennosides (Senokot) tab 17.2 mg  17.2 mg Oral Nightly PRN    bisacodyl (Dulcolax) 10 MG rectal suppository 10 mg  10 mg Rectal Daily PRN    fleet enema (Fleet) 7-19 GM/118ML rectal enema 133 mL  1 enema Rectal Once PRN    nicotine (Nicoderm CQ) 21 MG/24HR patch 1 patch  1 patch Transdermal Daily    [START ON 1/23/2024] cefTRIAXone (Rocephin) 2 g in D5W 100 mL IVPB-ADD  2 g Intravenous Q24H    traMADol (Ultram) tab 50 mg  50 mg Oral Q6H PRN    [COMPLETED] sodium chloride 0.9 % IV bolus 1,000 mL  1,000 mL Intravenous Once    [COMPLETED] ketorolac (Toradol) 15 MG/ML injection 15 mg   15 mg Intravenous Once       Outpatient Medications:     Medications Prior to Admission   Medication Sig Dispense Refill Last Dose    acetaminophen 325 MG Oral Tab Take 1 tablet (325 mg total) by mouth every 6 (six) hours as needed for Pain.   Past Week    ibuprofen 200 MG Oral Tab Take 1 tablet (200 mg total) by mouth every 6 (six) hours as needed for Pain.   1/21/2024    Naproxen Sodium (ALEVE OR) Take by mouth.   Past Week    cetirizine 10 MG Oral Tab Take 1 tablet (10 mg total) by mouth daily.   Past Month    B Complex Vitamins (B COMPLEX OR) Take by mouth daily.   Past Week    BIOTIN OR Take by mouth daily.   Taking    COLLAGEN OR Take by mouth daily.   Past Week    Multiple Vitamins-Minerals (MULTIVITAMIN ADULT OR) Take by mouth daily.   Past Week    Bioflavonoid Products (LISA C OR) Take by mouth daily.   1/21/2024    LYSINE OR Take by mouth daily.   Past Week    Misc Natural Products (OSTEO BI-FLEX ADV JOINT SHIELD) Oral Tab Take by mouth daily.   Taking    Digestive Enzymes (PAPAYA AND ENZYMES OR) Take by mouth daily.   1/21/2024    Probiotic Product (PROBIOTIC DAILY OR) Take by mouth daily.   1/21/2024    methylPREDNISolone (MEDROL) 4 MG Oral Tablet Therapy Pack Dosepack: take as directed 1 each 0     HYDROcodone-acetaminophen 5-325 MG Oral Tab Take 1 tablet by mouth every 4 (four) hours as needed. 30 tablet 0     Fluticasone Propionate 50 MCG/ACT Nasal Suspension 1 spray by Each Nare route daily.   More than a month    Bromelains (BROMELAIN OR) Take by mouth daily.   More than a month    CHROMIUM OR Take by mouth daily.   More than a month    Coenzyme Q10 (COQ10 OR) Take by mouth.   More than a month    GLUTAMINE OR Take by mouth daily.   More than a month    Green Tea, Camillia sinensis, (GREEN TEA OR) Take by mouth daily.   More than a month    Omega-3 Fatty Acids (OMEGA-3 FISH OIL OR) Take by mouth daily.   More than a month       Allergies: Patient has no known allergies.      Anesthesia  Evaluation    Patient summary reviewed.    Anesthetic Complications  (-) history of anesthetic complications         GI/Hepatic/Renal                                 Cardiovascular                (+) obesity                                       Endo/Other                                  Pulmonary                           Neuro/Psych                                      Past Surgical History:   Procedure Laterality Date          HERNIA SURGERY            REPAIR ING HERNIA,5+Y/O,REDUCIBL      VENTRAL HERNIA REPAIR N/A 2016    Procedure: HERNIA VENTRAL REPAIR;  Surgeon: Vijay Randall MD;  Location:  MAIN OR     Social History     Socioeconomic History    Marital status:    Tobacco Use    Smoking status: Every Day     Packs/day: 1.00     Years: 30.00     Additional pack years: 0.00     Total pack years: 30.00     Types: Cigarettes     Passive exposure: Current   Vaping Use    Vaping Use: Some days   Substance and Sexual Activity    Alcohol use: No     Alcohol/week: 0.0 standard drinks of alcohol    Drug use: No     History   Drug Use No     Available pre-op labs reviewed.  Lab Results   Component Value Date    WBC 15.4 (H) 2024    RBC 3.49 (L) 2024    HGB 9.7 (L) 2024    HCT 29.8 (L) 2024    MCV 85.4 2024    MCH 27.8 2024    MCHC 32.6 2024    RDW 16.0 2024    .0 (H) 2024     Lab Results   Component Value Date     (L) 2024    K 4.1 2024     2024    CO2 31.0 2024    BUN 14 2024    CREATSERUM 1.09 (H) 2024     (H) 2024    CA 8.8 2024            Airway      Mallampati: II  Mouth opening: >3 FB  TM distance: > 6 cm  Neck ROM: full Cardiovascular    Cardiovascular exam normal.         Dental    Dentition appears grossly intact         Pulmonary    Pulmonary exam normal.                 Other findings              ASA: 3   Plan: general  NPO status verified and            Plan/risks discussed with: patient                Present on Admission:  **None**

## 2024-01-23 NOTE — ANESTHESIA POSTPROCEDURE EVALUATION
[facility]    Kirstin Myers Patient Status:  Observation   Age/Gender 53 year old female MRN [id number]   Location [facility] SURGERY Attending Paul Fagan DO   [facility] Day # 0 PCP None Pcp       Anesthesia Post-op Note    CYSTOSCOPY, LEFT RETROGRADE, PYELOGRAM, INSERTION OF LEFT URETERAL STENT, UPPER RENAL PELVIS URINE SAMPLING    Procedure Summary       Date: 01/22/24 Room / Location:  MAIN OR 07 /  MAIN OR    Anesthesia Start: 2124 Anesthesia Stop: 2209    Procedure: CYSTOSCOPY, LEFT RETROGRADE, PYELOGRAM, INSERTION OF LEFT URETERAL STENT, UPPER RENAL PELVIS URINE SAMPLING (Left: Ureter) Diagnosis:       Ureteral calculi      (same as pre-op)    Surgeons: Jason Pena MD Anesthesiologist: Mike Levin MD    Anesthesia Type: general ASA Status: 3            Anesthesia Type: general    Vitals Value Taken Time   /77 01/22/24 2209   Temp 101.7 °F (38.7 °C) 01/22/24 2209   Pulse 103 01/22/24 2210   Resp 17 01/22/24 2210   SpO2 91 % 01/22/24 2210   Vitals shown include unfiled device data.    Patient Location: PACU    Anesthesia Type: general    Airway Patency: patent and extubated    Postop Pain Control: adequate    Mental Status: preanesthetic baseline    Nausea/Vomiting: none    Cardiopulmonary/Hydration status: stable euvolemic    Complications: no apparent anesthesia related complications    Postop vital signs: stable    Dental Exam: Unchanged from Preop    Patient to be discharged from PACU when criteria met.

## 2024-01-23 NOTE — PROGRESS NOTES
[facility]  Urology Progress Note    Kirstin Myers Patient Status:  Observation    3/24/1970 MRN [id number]   Location [facility] 0SW-A Attending Paul Fagan,    Hosp Day # 0 PCP None Pcp     Subjective:  Kirstin Myers is a(n) 53 year old female. Status-post cystoscopy, left RGPG, placement of left ureteral stent 24 with Dr. Pena    Current complaints: +fevers (Tmax 101.7 yesterday), most recent temp 97.7.  No chills.  No nausea/vomiting.  No abdominal/flank pain.  No dysuria or hematuria.  +urinary urgency.      Objective:  General appearance: alert, appears stated age, and cooperative  Blood pressure 135/58, pulse 77, temperature 97.7 °F (36.5 °C), temperature source Oral, resp. rate 18, height 5' 8\" (1.727 m), weight 287 lb 6.4 oz (130.4 kg), SpO2 99%.  Lungs: non-labored respirations   Abdomen: soft, non-tender  No flank tenderness.        No results found for this visit on 24.     XR OR - N/C    Result Date: 2024  CONCLUSION:  4 fluoroscopic image(s) obtained and submitted during urologic procedure.  Correlation with real-time fluoroscopy and operative report are recommended.   LOCATION:  [place]    Dictated by (CST): Jason Connelly MD on 2024 at 11:42 PM     Finalized by (CST): Jason Connelly MD on 2024 at 11:42 PM       CT ABDOMEN PELVIS IV CONTRAST, NO ORAL (ER)    Result Date: 2024  CONCLUSION:   1. There is heterogenous appearance of the left kidney with moderate to severe hydronephrosis due to a large obstructing stone in the left renal pelvis extending up to the left UPJ measuring up to 4.3 cm. There is mild to moderate left perinephric stranding.  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  Clinical correlation recommended.   2. Enlarged retroperitoneal lymph nodes measuring up to 3.5 x 1.7 cm in the left periaortic region that may be  reactive, with other etiologies not entirely excluded.  Clinical correlation recommended.  PET-CT may also be of further value.  3. Decompressed urinary bladder, with superimposed cystitis not entirely excluded.  Clinical correlation recommended.   4. Additional left nephrolithiasis.  5. Uncomplicated colonic diverticulosis.  6. Hepatomegaly.   Please see above for further details.  A preliminary report was provided by the [employer] [facility].  LOCATION:  [facility], [place]   Dictated by (CST): Chuck Cast MD on 1/22/2024 at 5:06 AM     Finalized by (CST): Chuck Cast MD on 1/22/2024 at 5:12 AM         Assessment:    LEFT FLANK PAIN, PYELONEPHRITIS, OBSTRUCTING LEFT RENAL PELVIC STONE EXTENDING UP TO LEFT UPJ (4.3 CM), LEFT UROLITHIASIS   Status-post cystoscopy, left RGPG, placement of left ureteral stent 1/22/24 with Dr. Pena  FINDINGS: Pus coming from the left kidney.  Dr. Pena sent a specimen of pus from the renal pelvis down for culture, in case voided sample previously collected is equivocal.  Await culture results.   Tmax 101.7 on 1/22, most recent temp 97.7  Lactic acid 0.7  WBC 15.4  Hgb 9.7  Platelet count 577  Serum creat 1.09  Serum calcium level 8.8  Urine culture 1/22/24: pending  Urine culture (collected at time of cysto) 1/22/24: pending  2/2 blood cultures pending    Plan:    Ureteral stent related symptoms reviewed with patient  Check final cultures  Abx per attending  Follow labs, temp  She will need to follow-up sometime in the next 2 to 3 weeks with a urologist on her Medicaid HMO panel to begin to arrange stone treatment.  Dr. Pena recommended to patient/daughter that they might want to consider a renal scan to evaluate the function of the left kidney to decide whether left nephrectomy or PCNL would make sense for her. Patient informed that the ureteral stent is not a permament implant and would need to be changed or removed before 3 months.    Above discussed with patient, nurse, Dr. Pena.       Catherine Mack PA-C  [facility]  Department of Urology  1/23/2024    Addendum:  Recent Labs   Lab 01/22/24  0001 01/23/24  0905   RBC 3.49* 3.30*   HGB 9.7* 9.1*   HCT 29.8* 29.0*   MCV 85.4 87.9   MCH 27.8 27.6   MCHC 32.6 31.4   RDW 16.0 15.9   NEPRELIM 12.21* 13.88*   WBC 15.4* 16.0*   .0* 582.0*       Recent Labs   Lab 01/22/24  0001 01/23/24  0905   * 176*   BUN 14 19   CREATSERUM 1.09* 1.25*   EGFRCR 60 51*   CA 8.8 8.6   * 137   K 4.1 4.6    107   CO2 31.0 27.0     WBC 15.4 > 16  Hgb 9.7 > 9.1  Platelet count 577 > 582  Serum creat 1.09 > 1.25  Serum calcium level 8.8 > 8.6    CHAD Najera  Urology

## 2024-01-24 VITALS
HEART RATE: 81 BPM | SYSTOLIC BLOOD PRESSURE: 150 MMHG | OXYGEN SATURATION: 92 % | WEIGHT: 287.38 LBS | TEMPERATURE: 99 F | RESPIRATION RATE: 18 BRPM | DIASTOLIC BLOOD PRESSURE: 80 MMHG | HEIGHT: 68 IN | BODY MASS INDEX: 43.55 KG/M2

## 2024-01-24 PROCEDURE — 99239 HOSP IP/OBS DSCHRG MGMT >30: CPT | Performed by: HOSPITALIST

## 2024-01-24 RX ORDER — SULFAMETHOXAZOLE AND TRIMETHOPRIM 800; 160 MG/1; MG/1
1 TABLET ORAL 2 TIMES DAILY
Qty: 42 TABLET | Refills: 0 | Status: SHIPPED | OUTPATIENT
Start: 2024-01-24 | End: 2024-02-14

## 2024-01-24 RX ORDER — TRAMADOL HYDROCHLORIDE 50 MG/1
50 TABLET ORAL EVERY 6 HOURS PRN
Qty: 20 TABLET | Refills: 0 | Status: SHIPPED | OUTPATIENT
Start: 2024-01-24

## 2024-01-24 NOTE — PROGRESS NOTES
[facility]  Urology Progress Note    Kirstin Myers Patient Status:  Observation    3/24/1970 MRN [id number]   Location [facility] 0SW-A Attending Paul Fagan,    Hosp Day # 0 PCP None Pcp     Subjective:  Kirstin Myers is a(n) 53 year old female.  Status-post cystoscopy, left RGPG, placement of left ureteral stent 24 with Dr. Pena    Current complaints: No abdominal/flank pain.  Slight dysuria, urinary urgency.  No gross hematuria.  No fever or chills.      Objective:  General appearance: alert, appears stated age, and cooperative  Blood pressure 121/57, pulse 74, temperature 98.4 °F (36.9 °C), temperature source Oral, resp. rate 18, height 5' 8\" (1.727 m), weight 287 lb 6.4 oz (130.4 kg), SpO2 99%.  Lungs: non-labored respirations  Abdomen: soft, non-tender  No flank tenderness    Lab Results   Component Value Date    WBC 16.0 2024    HGB 9.1 2024    HCT 29.0 2024    .0 2024    CREATSERUM 1.25 2024    BUN 19 2024     2024    K 4.6 2024     2024    CO2 27.0 2024     2024    CA 8.6 2024       Hospital Encounter on 24   1. Blood Culture     Status: None (Preliminary result)    Collection Time: 24  3:25 AM    Specimen: Blood,peripheral   Result Value Ref Range    Blood Culture Result No Growth 1 Day N/A   2. Urine Culture, Routine     Status: Abnormal (Preliminary result)    Collection Time: 24 12:50 AM    Specimen: Urine, clean catch   Result Value Ref Range    Urine Culture >100,000 CFU/ML Escherichia coli (A) N/A        XR OR - N/C    Result Date: 2024  CONCLUSION:  4 fluoroscopic image(s) obtained and submitted during urologic procedure.  Correlation with real-time fluoroscopy and operative report are recommended.   LOCATION:  [place]    Dictated by (CST): Jason Connelly MD on 2024 at 11:42 PM     Finalized by (CST): Jason Connelly MD on 2024 at 11:42 PM          Assessment:    LEFT FLANK PAIN, PYELONEPHRITIS, OBSTRUCTING LEFT RENAL PELVIC STONE EXTENDING UP TO LEFT UPJ (4.3 CM), LEFT UROLITHIASIS   Status-post cystoscopy, left RGPG, placement of left ureteral stent 1/22/24 with Dr. Pena  FINDINGS: Pus coming from the left kidney.  Dr. Pena sent a specimen of pus from the renal pelvis down for culture, in case voided sample previously collected is equivocal.  Await culture results.   Tmax 101.7 on 1/22, Tmax 99.4 on 1/23, most recent temp 98.4  Lactic acid 0.7  WBC 15.4 > 16  Hgb 9.7 > 9.1  Platelet count 577 > 582  Serum creat 1.09 > 1.25  Serum calcium level 8.8 > 8.6  Urine culture 1/22/24: prelim >100K CFU/mL E. coli  Urine culture (collected at time of cysto) 1/22/24: pending  2/2 blood cultures prelim no growth after 1 day    Plan:    Ureteral stent related symptoms reviewed with patient  Check final cultures  Abx per attending  Follow labs, temp  She will need to follow-up sometime in the next 2 to 3 weeks with a urologist on her Medicaid HMO panel to begin to arrange stone treatment.  Dr. Pena recommended to patient/daughter that they might want to consider a renal scan to evaluate the function of the left kidney to decide whether left nephrectomy or PCNL would make sense for her. Patient informed that the ureteral stent is not a permament implant and would need to be changed or removed before 3 months.    Catherine Mack PA-C  [facility]  Department of Urology  1/24/2024  6:42 AM    Addendum:    Urine culture 1/22/24:   >100,000 CFU/ML Escherichia coli Abnormal            Resulting Agency: [facility] (Formerly [facility], [facility])     Susceptibility     Escherichia coli     Not Specified    Ampicillin >=32 Resistant    Ampicillin + Sulbactam >=32 Resistant    Cefazolin <=4 Sensitive    Ciprofloxacin >=4 Resistant    Gentamicin <=1 Sensitive    Levofloxacin >=8 Resistant    Meropenem <=0.25 Sensitive    Nitrofurantoin <=16 Sensitive    Piperacillin + Tazobactam >=128 Resistant     Trimethoprim/Sulfa <=20 Sensitive                      Urine culture 1/22/24 (collected at time of cysto): prelim >100K CFU/mL E. Coli    Culture results reviewed with Dr. Jean harmon recommends Bactrim DS BID x 3 weeks.    Above discussed with hospitalist.      CHAD Najera  Urology

## 2024-01-24 NOTE — PLAN OF CARE
Patient is alert, c/o neck/back pain - PRN per MAR. Reports improvement in urinary urgency. VSS, afebrile. AVS reviewed with patient, all questions answered. Patient verbalized all belongings returned.

## 2024-01-24 NOTE — DISCHARGE SUMMARY
[facility]     Kirstin Myers Patient Status:  Observation    3/24/1970 MRN [id number]   Location [facility] 0SW-A Attending Paul Fagan,    Hosp Day # 0 PCP None Pcp     Date of Admission: 2024  Date of Discharge:   2024    Discharge Disposition: Home or Self Care    Discharge Diagnosis:  Staghorn calculus resulting in left hydronephrosis  Acute pyelonephritis  Hyponatremia  Normocytic anemia  Morbid obesity  Tobacco use     History of Present Illness: Kirstin Myers is a 53 year old female with no known medical history who comes to the ER with complaints of rigors, fevers and left sided flank pain.  She reports it has been intermittently occurring over the past 3 weeks.  Over the past few days it has been more constant.  She did not take her temperature at home but reports she had subjective fevers.  Her pain initially started in her left flank and then started to radiate around to the left side of her abdomen.     Brief Synopsis:     #Left staghorn calculus with hydronephrosis s/p cystoscopy with left ureteral stent placement   -Definitive stone intervention and renal scan as OP per urology     # Acute pyelonephritis  -Culture with E. Coli  -D/w urology, plan to complete 3 week course of Bactrim as OP     # Hyponatremia  -Resolved     # Normocytic anemia  -Monitor    #Thrombocytosis  -Likely reactive  -Monitor      # Morbid obesity  -BMI 43     # Tobacco abuse  -Nicotine patch     # Presumed CKDIII  -Baseline unknown  -Renal function stable     Lace+ Score: 29  59-90 High Risk  29-58 Medium Risk  0-28   Low Risk  Patient was referred to the [facility].    TCM Follow-Up Recommendation:  LACE 29-58: Moderate Risk of readmission after discharge from the hospital.      Procedures during hospitalization:   CYSTOSCOPY, LEFT RETROGRADE, PYELOGRAM, INSERTION OF LEFT URETERAL STENT, UPPER RENAL PELVIS URINE SAMPLING     Incidental or significant  findings and recommendations (brief descriptions):  None    Lab/Test results pending at Discharge:   None    Consultants:  Urology    Discharge Medication List:     Discharge Medications        START taking these medications        Instructions Prescription details   sulfamethoxazole-trimethoprim -160 MG Tabs per tablet  Commonly known as: Bactrim DS      Take 1 tablet by mouth 2 (two) times daily for 21 days.   Stop taking on: February 14, 2024  Quantity: 42 tablet  Refills: 0     traMADol 50 MG Tabs  Commonly known as: Ultram      Take 1 tablet (50 mg total) by mouth every 6 (six) hours as needed for Pain.   Quantity: 20 tablet  Refills: 0            CONTINUE taking these medications        Instructions Prescription details   acetaminophen 325 MG Tabs  Commonly known as: Tylenol      Take 1 tablet (325 mg total) by mouth every 6 (six) hours as needed for Pain.   Refills: 0     ALEVE OR      Take by mouth.   Refills: 0     B COMPLEX OR      Take by mouth daily.   Refills: 0     BIOTIN OR      Take by mouth daily.   Refills: 0     BROMELAIN OR      Take by mouth daily.   Refills: 0     cetirizine 10 MG Tabs  Commonly known as: ZyrTEC      Take 1 tablet (10 mg total) by mouth daily.   Refills: 0     CHROMIUM OR      Take by mouth daily.   Refills: 0     COLLAGEN OR      Take by mouth daily.   Refills: 0     COQ10 OR      Take by mouth.   Refills: 0     LISA C OR      Take by mouth daily.   Refills: 0     fluticasone propionate 50 MCG/ACT Susp  Commonly known as: Flonase      1 spray by Each Nare route daily.   Refills: 0     GLUTAMINE OR      Take by mouth daily.   Refills: 0     GREEN TEA OR      Take by mouth daily.   Refills: 0     ibuprofen 200 MG Tabs  Commonly known as: Motrin      Take 1 tablet (200 mg total) by mouth every 6 (six) hours as needed for Pain.   Refills: 0     LYSINE OR      Take by mouth daily.   Refills: 0     methylPREDNISolone 4 MG Tbpk  Commonly known as: Medrol      Dosepack: take as  directed   Quantity: 1 each  Refills: 0     MULTIVITAMIN ADULT OR      Take by mouth daily.   Refills: 0     OMEGA-3 FISH OIL OR      Take by mouth daily.   Refills: 0     Osteo Bi-Flex Adv Joint Shield Tabs      Take by mouth daily.   Refills: 0     PAPAYA AND ENZYMES OR      Take by mouth daily.   Refills: 0     PROBIOTIC DAILY OR      Take by mouth daily.   Refills: 0            STOP taking these medications      HYDROcodone-acetaminophen 5-325 MG Tabs  Commonly known as: Norco                  Where to Get Your Medications        These medications were sent to [id number] - [place], [place] - [address] [place] [address] AT [facility] & [address], [phone], [phone]  [address] [place] [id number]      Phone: [phone]   sulfamethoxazole-trimethoprim -160 MG Tabs per tablet  traMADol 50 MG Tabs         [place]    Follow-up appointment:   Jason Pena MD  [address]  [address]  [facility] [address] [phone]    Go to  Follow up with urology doctor that your insurance accepts    Appointments for Next 30 Days 2024 - 2024      None            Vital signs:  Temp:  [98.4 °F (36.9 °C)-99.1 °F (37.3 °C)] 98.8 °F (37.1 °C)  Pulse:  [73-81] 81  Resp:  [17-18] 18  BP: (121-150)/(57-80) 150/80  SpO2:  [92 %-100 %] 92 %    Physical Exam:    General: No acute distress, morbidly obese  Lungs: clear to auscultation  Cardiovascular: S1, S2  Abdomen: Soft    -----------------------------------------------------------------------------------------------  PATIENT DISCHARGE INSTRUCTIONS: See electronic chart    Paul Fagan DO    Total time spent on discharge plannin minutes     The 21st Century Cures Act makes medical notes like these available to patients in the interest of transparency. Please be advised this is a medical document. Medical documents are intended to carry relevant information, facts as evident, and the clinical opinion of the practitioner. The medical note is  intended as peer to peer communication and may appear blunt or direct. It is written in medical language and may contain abbreviations or verbiage that are unfamiliar.

## 2024-01-25 ENCOUNTER — PATIENT OUTREACH (OUTPATIENT)
Dept: CASE MANAGEMENT | Age: 54
End: 2024-01-25

## 2024-01-25 NOTE — PROGRESS NOTES
[facility]  [facility] contact information provided as well as [facility] office number, [phone].

## 2024-01-25 NOTE — PROGRESS NOTES
[facility]  [facility] contact information provided as well as [facility] office number, [phone].

## 2024-02-20 ENCOUNTER — TELEPHONE (OUTPATIENT)
Dept: SURGERY | Facility: CLINIC | Age: 54
End: 2024-02-20

## 2024-02-20 ENCOUNTER — OFFICE VISIT (OUTPATIENT)
Dept: SURGERY | Facility: CLINIC | Age: 54
End: 2024-02-20
Payer: MEDICAID

## 2024-02-20 DIAGNOSIS — N20.0 STAGHORN CALCULUS: Primary | ICD-10-CM

## 2024-02-20 DIAGNOSIS — N17.9 AKI (ACUTE KIDNEY INJURY) (HCC): ICD-10-CM

## 2024-02-20 DIAGNOSIS — R82.90 URINE FINDING: ICD-10-CM

## 2024-02-20 DIAGNOSIS — N12 PYELONEPHRITIS: ICD-10-CM

## 2024-02-20 DIAGNOSIS — D72.829 LEUKOCYTOSIS, UNSPECIFIED TYPE: ICD-10-CM

## 2024-02-20 LAB
APPEARANCE: CLEAR
BILIRUBIN: NEGATIVE
GLUCOSE (URINE DIPSTICK): NEGATIVE MG/DL
KETONES (URINE DIPSTICK): NEGATIVE MG/DL
MULTISTIX LOT#: ABNORMAL NUMERIC
NITRITE, URINE: NEGATIVE
PH, URINE: 6 (ref 4.5–8)
PROTEIN (URINE DIPSTICK): 100 MG/DL
SPECIFIC GRAVITY: 1.02 (ref 1–1.03)
URINE-COLOR: YELLOW
UROBILINOGEN,SEMI-QN: 0.2 MG/DL (ref 0–1.9)

## 2024-02-20 PROCEDURE — 81002 URINALYSIS NONAUTO W/O SCOPE: CPT | Performed by: PHYSICIAN ASSISTANT

## 2024-02-20 PROCEDURE — 99204 OFFICE O/P NEW MOD 45 MIN: CPT | Performed by: PHYSICIAN ASSISTANT

## 2024-02-20 NOTE — PATIENT INSTRUCTIONS
Urinary tract infections can affect your general health and your quality of life.  Some UTI’s can be prevented.  Here are some suggestions that my help prevent frequent UTI’s.     Good Hygiene: Always wipe front to back.  Don’t use perfumed soaps.  Plain soaps like Ivory are the best.  Don’t use washcloths.  Place soap directly on your hands and clean the genital area.  Rinse thoroughly.  Soap can be very irritating to the vaginal mucosa.     Urination: Urinate every 2-3 hours during the day.  Empty your bladder just before going to bed and  first thing when you wake up.  Make sure you go to the bathroom when you have a strong urge. Don't hover over the toilet to urinate.  The bladder may not be completely emptying when using this technique.  Sometimes you may need to \"double-void\".  After you finish urinating, stand up, then sit back down to urinate again.     Clothing: Wear cotton underwear. Change daily.  Avoid tight jeans.       [place]: Sometimes UTI’s are related to intercourse.  Wash genital area before and rinse afterwards.  Empty your bladder before and after intercourse.  Use of vaginal lubricants may be helpful.  Condoms and some lubricants may be irritating to the vaginal mucosa.  Spermicide should be avoided.  Talk to your doctor, you may require a suppressive antibiotic to take after intercourse.     Supplements: Take Vitamin C 500 or 1000mg daily.  Cranberry pills 400mg daily or if symptomatic 400mg two times per day.  Eat yogurt or consider taking a probiotic.  D-mannose 1 gram is another supplement that can help prevent infections.  This one is harder to find, but can be found at stores such as [employer], [employer], or a specialized vitamin store.  Fluid intake should be at least 48oz daily with the goal of 64oz daily.  Water is preferable.      Constipation: Constipation has been linked to UTI’s.  You should be having a soft BM daily.  Dietary fiber should be between 20-25 grams daily.   Flaxseed, All-Bran cereal, Fiber One bars, fruits and vegetables are a good sources of fiber.  Alternatively, Metamucil can be used daily.  Gentle laxatives and stool softeners may be added on a daily or as needed basis if necessary (Miralax, Colace, Pericolace).      Vaginal creams and moisturizers: It may be recommended you try vaginal creams, moisturizers or lubricants as a prevention method.  Over-the-Counter products:  Replens:  1 applicator three times per week  Luvena prebiotic vaginal moisturizer and lubricant:  package of 6.  1 tube every three days at bedtime.  Helps restore pH balance, decrease vaginal dryness, odor and itchiness.  KY liquibeads  KY silk - moisture enhancer  MeAgain Vaginal Moisturizer.  8 per package.  Use 1 daily for 7 days then 1 daily two times per week.  Astroglide  Prescriptions:  Estrace Cream  Premarin Cream  E-string     Prescription medications: Your doctor may recommend daily or as needed prescription medications including antibiotics to prevent urinary tract infection as well.

## 2024-02-20 NOTE — PROGRESS NOTES
[facility], [facility]    Urology Consult Note    History of Present Illness:   Patient is a 53 year old female with hx of newly diagnosed DM (A1c 7.6%) who presents today for evaluation of left staghorn calculus.     Patient presented to the ED 24 for fevers/rigors. Labs showed white count of 15.4, creatinine 1.09, lactate 0.7, . CT a/p was obtained that showed a 4.3cm calculus with severe hydronephrosis and moderate left perinephric stranding. She was taken urgently for left ureteral stent placement by Dr. Jason Pena and advised to follow up with another urology group because her insurance was not accepted by his group. At discharge she had a white count of 16 and serum creatinine of 1.25. Urine culture from admission >100K E Coli, S Cefazolin, Gent, Meropenm, Macrobid, T/s. Intraoperative urine culture E Coli and Proteus.  Blood cultures negative. She was discharged on a three week course of Bactrim, which she has finished.     Patient notes that she has had intermittent left flank pain and rigors since 2023. The most recent episode of rigors occurred just prior to her presentation to the ED, more so because she slipped and fell getting out of the hot tub that she was using to try to warm herself up when she experienced the rigors.     Since discharge patient has been doing well. No fevers or chills. Had significant urinary frequency/urgency the first 24-48 hours but since then has been doing much better. A few days ago developed increased LUTS again and wonders if she has an infection. She notes historically no significant history of UTIs and no prior history of stones.     HISTORY:  No past medical history on file.   Past Surgical History:   Procedure Laterality Date          HERNIA SURGERY            REPAIR ING HERNIA,5+Y/O,REDUCIBL      VENTRAL HERNIA REPAIR N/A 2016    Procedure: HERNIA VENTRAL REPAIR;  Surgeon: Vijay Randall MD;  Location:   MAIN OR      Family History   Problem Relation Age of Onset    Cancer Mother     Heart Disease Maternal Grandfather       Social History:   Social History     Socioeconomic History    Marital status:    Tobacco Use    Smoking status: Every Day     Packs/day: 1.00     Years: 30.00     Additional pack years: 0.00     Total pack years: 30.00     Types: Cigarettes     Passive exposure: Current   Vaping Use    Vaping Use: Some days   Substance and Sexual Activity    Alcohol use: No     Alcohol/week: 0.0 standard drinks of alcohol    Drug use: No     Social Determinants of Health     Food Insecurity: No Food Insecurity (1/22/2024)    Food Insecurity     Food Insecurity: Never true   Transportation Needs: No Transportation Needs (1/22/2024)    Transportation Needs     Lack of Transportation: No   Housing Stability: Low Risk  (1/22/2024)    Housing Stability     Housing Instability: No        Allergies  No Known Allergies    Review of Systems:   A 10-point review of systems was completed and is negative other than as noted above.    Physical Exam:   There were no vitals taken for this visit.    GENERAL APPEARANCE: well developed, well nourished, in no acute distress  NEUROLOGIC: no localizing neurologic signs, alert and oriented x 3, converses appropriately  HEAD: atraumatic, normocephalic  EYES: sclera non-icteric  ORAL CAVITY: mucosa moist  NECK/THYROID: no obvious masses or goiter  LUNGS: non-labored breathing  ABDOMEN: soft, nontender, nondistended  EXTREMITIES: warm, well-perfused. No clubbing, cyanosis or edema.  SKIN: no obvious rashes    Results:     Laboratory Data:  Lab Results   Component Value Date    WBC 16.0 (H) 01/23/2024    HGB 9.1 (L) 01/23/2024    .0 (H) 01/23/2024     Lab Results   Component Value Date     01/23/2024    K 4.6 01/23/2024     01/23/2024    CO2 27.0 01/23/2024    BUN 19 01/23/2024     (H) 01/23/2024    AST 36 01/22/2024    ALT  01/22/2024      Comment:       Due to  backorder we are temporarily unable to offer hospital-based ALT testing at [facility].   If urgently needed, please order ALT test code [id number].   The new order will need a new venipuncture and will be sent to [facility] for testing.   The expected turnaround time will be within 24 hours.     TP 7.8 01/22/2024    ALB 2.1 (L) 01/22/2024    CA 8.6 01/23/2024       Urinalysis Results (last three years):  Recent Labs     01/22/24  0050 02/20/24  1209   COLORUR Yellow  --    CLARITY Turbid*  --    SPECGRAVITY 1.020 1.025   PHURINE 6.5 6.0   PROUR 50*  --    GLUUR Normal  --    KETUR Negative  --    BILUR Negative  --    BLOODURINE Negative  --    NITRITE 2+* Negative   UROBILINOGEN 3*  --    LEUUR 500*  --    WBCUR >50*  --    RBCUR 3-5*  --    BACUR 3+*  --        Urine Culture Results (last three years):  Lab Results   Component Value Date    URINECUL >100,000 CFU/ML Escherichia coli (A) 01/22/2024    URINECUL >100,000 CFU/ML Proteus mirabilis (A) 01/22/2024    URINECUL >100,000 CFU/ML Escherichia coli (A) 01/22/2024       Imaging  XR OR - N/C    Result Date: 1/22/2024  PROCEDURE:  XR OR - N/C  COMPARISON:  None.  INDICATIONS:  Intra procedural fluoroscopic guidance  TECHNIQUE:   FLUOROSCOPY IMAGES OBTAINED:  4 FLUOROSCOPY TIME:  1 minute 41 seconds TECHNOLOGIST TIME:  30 minutes RADIATION DOSE (AIR KERMA PRODUCT):  2085.9uGy/m2   FINDINGS:  4 fluoroscopic image(s) obtained and submitted during urologic procedure.  No radiologist was present for the exam.  Correlation with real-time fluoroscopy and operative report are recommended.            CONCLUSION:  4 fluoroscopic image(s) obtained and submitted during urologic procedure.  Correlation with real-time fluoroscopy and operative report are recommended.   LOCATION:  [place]    Dictated by (CST): Jason Connelly MD on 1/22/2024 at 11:42 PM     Finalized by (CST): Jason Connelly MD on 1/22/2024 at 11:42 PM       CT ABDOMEN PELVIS IV CONTRAST, NO ORAL  (ER)    Result Date: 1/22/2024  PROCEDURE:  CT ABDOMEN PELVIS IV CONTRAST, NO ORAL (ER)  COMPARISON:  None.  INDICATIONS:  couple weeks having rigors, concerned for infection, having fevers  TECHNIQUE:  CT scanning was performed from the dome of the diaphragm to the pubic symphysis with non-ionic intravenous contrast material. Post contrast coronal MPR imaging was performed.  Dose reduction techniques were used. Dose information is transmitted to the ACR (American College of Radiology) NRDR (National Radiology Data Registry) which includes the Dose Index Registry.  PATIENT STATED HISTORY:(As transcribed by Technologist)  Fever with rigors and nausea for several weeks.    CONTRAST USED:  100cc of Omnipaque 350  FINDINGS: LUNG BASE:  Scattered scarring/atelectasis LIVER:  There is hepatomegaly measuring up to 21 cm in craniocaudal dimension. BILIARY:  Unremarkable. SPLEEN:  Unremarkable. PANCREAS:  Unremarkable. ADRENALS:  Unremarkable. KIDNEYS:  There is heterogenous appearance of the left kidney with moderate to severe hydronephrosis due to a large obstructing stone in the left renal pelvis extending up to the left UPJ measuring up to 4.3 cm. There is mild to moderate left perinephric  stranding.  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  Clinical correlation recommended.  Probable cyst in mid to lower pole left kidney measuring up to 4.3 cm.  There are some additional nonobstructing stones in the lower pole left kidney measuring up to 8 mm. AORTA/VASCULAR:  Mild calcified plaque in the aorta RETROPERITONEUM:  Enlarged retroperitoneal lymph nodes measuring up to 3.5 x 1.7 cm in the left periaortic region that may be reactive, with other etiologies not entirely excluded.  Clinical correlation recommended. BOWEL/MESENTERY:  Small hiatal hernia.  Unremarkable appendix.  Uncomplicated colonic  diverticulosis.  Scattered feces in the colon.  No large or small bowel dilatation.  No free air or free fluid. ABDOMINAL WALL:  Ventral hernia repair changes. PELVIC ORGANS:  Decompressed urinary bladder, with superimposed cystitis not entirely excluded.  Clinical correlation recommended.  Unremarkable uterus and ovaries.  Pelvic phleboliths. LYMPH NODES:  See above. BONES:  Degenerative changes in the spine and hips. OTHER:  None.            CONCLUSION:   1. There is heterogenous appearance of the left kidney with moderate to severe hydronephrosis due to a large obstructing stone in the left renal pelvis extending up to the left UPJ measuring up to 4.3 cm. There is mild to moderate left perinephric stranding.  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  Clinical correlation recommended.   2. Enlarged retroperitoneal lymph nodes measuring up to 3.5 x 1.7 cm in the left periaortic region that may be reactive, with other etiologies not entirely excluded.  Clinical correlation recommended.  PET-CT may also be of further value.  3. Decompressed urinary bladder, with superimposed cystitis not entirely excluded.  Clinical correlation recommended.   4. Additional left nephrolithiasis.  5. Uncomplicated colonic diverticulosis.  6. Hepatomegaly.   Please see above for further details.  A preliminary report was provided by the [facility] teleradiology service.  LOCATION:  [facility]   Dictated by (CST): Chuck Cast MD on 1/22/2024 at 5:06 AM     Finalized by (CST): Chuck Cast MD on 1/22/2024 at 5:12 AM           Impression:     Patient is a 53 year old female with hx of newly diagnosed DM (A1c 7.6%) who presents today for evaluation of left staghorn calculus.     Reviewed images with patient and daughter who is present  Treatment options/considerations reviewed.  Potential for PCNL vs nephrectomy,  recommend further discussion with urologist regarding these options. Discussed with Dr. Herrera, will defer MAG3 renal at this time.     UTI prevention reviewed, handout provided    Recommendations:  Repeat labs today and send urine for culture.  Follow up with Dr. Ann to discuss options.     Thank you very much for this consult. Please call if there are any questions or concerns.     Linda Sommer PA-C  Urology  [facility]    Date: 2/20/2024

## 2024-02-21 NOTE — TELEPHONE ENCOUNTER
Please call patient to schedule follow up with Dr. Ann - would like to be in expedited visit.   Also needs repeat labs as ordered.

## 2024-02-23 ENCOUNTER — TELEPHONE (OUTPATIENT)
Dept: SURGERY | Facility: CLINIC | Age: 54
End: 2024-02-23

## 2024-02-23 DIAGNOSIS — N20.0 STAGHORN CALCULUS: Primary | ICD-10-CM

## 2024-02-23 NOTE — TELEPHONE ENCOUNTER
Per pt kevin was unable to access the office information in their system and asking to send Linda's information including address and NPI #.    [facility] [phone]  Fax: [phone]

## 2024-02-26 NOTE — TELEPHONE ENCOUNTER
Called pt to help schedule, no answer, left message to call back.  Appt made for 3/5 at 1:00.   [employer] message sent.

## 2024-02-26 NOTE — TELEPHONE ENCOUNTER
This encounter is now closed.     CBC and BMP orders forwarded to [facility], 2/26.

## 2024-02-26 NOTE — TELEPHONE ENCOUNTER
Per pt states kevin needs Linda's NPI and address to the office.  I provided the information (pt refused to take it last time) and she still wants the nurse to call kevin to provide the information. Please advise

## 2024-02-27 ENCOUNTER — TELEPHONE (OUTPATIENT)
Dept: SURGERY | Facility: CLINIC | Age: 54
End: 2024-02-27

## 2024-02-27 LAB
ABSOLUTE BASOPHILS: 119 CELLS/UL (ref 0–200)
ABSOLUTE EOSINOPHILS: 213 CELLS/UL (ref 15–500)
ABSOLUTE LYMPHOCYTES: 2236 CELLS/UL (ref 850–3900)
ABSOLUTE MONOCYTES: 521 CELLS/UL (ref 200–950)
ABSOLUTE NEUTROPHILS: 4811 CELLS/UL (ref 1500–7800)
BASOPHILS: 1.5 %
BUN: 15 MG/DL (ref 7–25)
CALCIUM: 9.4 MG/DL (ref 8.6–10.4)
CARBON DIOXIDE: 30 MMOL/L (ref 20–32)
CHLORIDE: 104 MMOL/L (ref 98–110)
CREATININE: 1.01 MG/DL (ref 0.5–1.03)
EGFR: 66 ML/MIN/1.73M2
EOSINOPHILS: 2.7 %
GLUCOSE: 139 MG/DL (ref 65–139)
HEMATOCRIT: 38.6 % (ref 35–45)
HEMOGLOBIN: 12.2 G/DL (ref 11.7–15.5)
LYMPHOCYTES: 28.3 %
MCH: 28.6 PG (ref 27–33)
MCHC: 31.6 G/DL (ref 32–36)
MCV: 90.6 FL (ref 80–100)
MONOCYTES: 6.6 %
MPV: 10.1 FL (ref 7.5–12.5)
NEUTROPHILS: 60.9 %
PLATELET COUNT: 384 THOUSAND/UL (ref 140–400)
POTASSIUM: 4.3 MMOL/L (ref 3.5–5.3)
RDW: 17.1 % (ref 11–15)
RED BLOOD CELL COUNT: 4.26 MILLION/UL (ref 3.8–5.1)
SODIUM: 140 MMOL/L (ref 135–146)
WHITE BLOOD CELL COUNT: 7.9 THOUSAND/UL (ref 3.8–10.8)

## 2024-02-27 NOTE — TELEPHONE ENCOUNTER
Per pt states her results were faxed over a few times and asking to discuss the results. Please advise

## 2024-03-05 ENCOUNTER — OFFICE VISIT (OUTPATIENT)
Dept: SURGERY | Facility: CLINIC | Age: 54
End: 2024-03-05
Payer: MEDICAID

## 2024-03-05 DIAGNOSIS — R82.90 URINE FINDING: Primary | ICD-10-CM

## 2024-03-05 DIAGNOSIS — N20.0 NEPHROLITHIASIS: ICD-10-CM

## 2024-03-05 LAB
APPEARANCE: CLEAR
BILIRUBIN: NEGATIVE
GLUCOSE (URINE DIPSTICK): NEGATIVE MG/DL
KETONES (URINE DIPSTICK): NEGATIVE MG/DL
MULTISTIX LOT#: ABNORMAL NUMERIC
NITRITE, URINE: NEGATIVE
PH, URINE: 7 (ref 4.5–8)
PROTEIN (URINE DIPSTICK): 100 MG/DL
SPECIFIC GRAVITY: 1.02 (ref 1–1.03)
URINE-COLOR: YELLOW
UROBILINOGEN,SEMI-QN: 0.2 MG/DL (ref 0–1.9)

## 2024-03-05 PROCEDURE — 99214 OFFICE O/P EST MOD 30 MIN: CPT | Performed by: SURGERY

## 2024-03-05 PROCEDURE — 81003 URINALYSIS AUTO W/O SCOPE: CPT | Performed by: SURGERY

## 2024-03-05 PROCEDURE — 51798 US URINE CAPACITY MEASURE: CPT | Performed by: SURGERY

## 2024-03-05 RX ORDER — OXYBUTYNIN CHLORIDE 10 MG/1
10 TABLET, EXTENDED RELEASE ORAL DAILY
Qty: 30 TABLET | Refills: 3 | Status: SHIPPED | OUTPATIENT
Start: 2024-03-05

## 2024-03-05 NOTE — PROGRESS NOTES
Urology Clinic Note    Primary Care Provider:  Pcp, None     Chief Complaint:   Nephrolithiasis    HPI:   Kirstin Myers is a 53 year old female with history of diabetes who was seen by SAIDA Wynn on 2024 for nephrolithiasis.  She presented with left flank pain and signs of infection.  CT 2024 shows moderate to severe hydronephrosis related to a large obstructing partial staghorn UPJ stone measuring 4.3 cm, as well as a 7 mm lower pole stone.  No right renal stones present.  She did have a stent placed by Dr. Pena on 2024.  She was unable to follow-up with [facility] due to insurance.    Today she feels well.  She gets occasional stent discomfort but minimal pain.  I discussed my recommendation of repeat CT scan and renal scan to evaluate improvement in hydronephrosis after stent placement and see the function of her left kidney.  I discussed that if she has a very low function of the kidney and the nephrectomy may be a better route.  If she has reasonable function of the kidney then we can plan for PCNL to treat her large stone.    History:   No past medical history on file.    Past Surgical History:   Procedure Laterality Date          HERNIA SURGERY            REPAIR ING HERNIA,5+Y/O,REDUCIBL      VENTRAL HERNIA REPAIR N/A 2016    Procedure: HERNIA VENTRAL REPAIR;  Surgeon: Vijay Randall MD;  Location:  MAIN OR       Family History   Problem Relation Age of Onset    Cancer Mother     Heart Disease Maternal Grandfather        Social History     Socioeconomic History    Marital status:    Tobacco Use    Smoking status: Every Day     Packs/day: 1.00     Years: 30.00     Additional pack years: 0.00     Total pack years: 30.00     Types: Cigarettes     Passive exposure: Current   Vaping Use    Vaping Use: Some days   Substance and Sexual Activity    Alcohol use: No     Alcohol/week: 0.0 standard drinks of alcohol    Drug use: No     Social Determinants of  Health     Food Insecurity: No Food Insecurity (1/22/2024)    Food Insecurity     Food Insecurity: Never true   Transportation Needs: No Transportation Needs (1/22/2024)    Transportation Needs     Lack of Transportation: No   Housing Stability: Low Risk  (1/22/2024)    Housing Stability     Housing Instability: No       Medications (Active prior to today's visit):  Current Outpatient Medications   Medication Sig Dispense Refill    Fluticasone Propionate 50 MCG/ACT Nasal Suspension 1 spray by Each Nare route daily.      Bromelains (BROMELAIN OR) Take by mouth daily.      COLLAGEN OR Take by mouth daily.      GLUTAMINE OR Take by mouth daily.      Probiotic Product (PROBIOTIC DAILY OR) Take by mouth daily.      acetaminophen 325 MG Oral Tab Take 1 tablet (325 mg total) by mouth every 6 (six) hours as needed for Pain. (Patient not taking: Reported on 3/5/2024)      ibuprofen 200 MG Oral Tab Take 1 tablet (200 mg total) by mouth every 6 (six) hours as needed for Pain. (Patient not taking: Reported on 3/5/2024)      Naproxen Sodium (ALEVE OR) Take by mouth. (Patient not taking: Reported on 3/5/2024)      methylPREDNISolone (MEDROL) 4 MG Oral Tablet Therapy Pack Dosepack: take as directed (Patient not taking: Reported on 3/5/2024) 1 each 0    cetirizine 10 MG Oral Tab Take 1 tablet (10 mg total) by mouth daily. (Patient not taking: Reported on 3/5/2024)      B Complex Vitamins (B COMPLEX OR) Take by mouth daily. (Patient not taking: Reported on 3/5/2024)      BIOTIN OR Take by mouth daily. (Patient not taking: Reported on 3/5/2024)      CHROMIUM OR Take by mouth daily. (Patient not taking: Reported on 3/5/2024)      Coenzyme Q10 (COQ10 OR) Take by mouth. (Patient not taking: Reported on 3/5/2024)      Multiple Vitamins-Minerals (MULTIVITAMIN ADULT OR) Take by mouth daily. (Patient not taking: Reported on 3/5/2024)      Bioflavonoid Products (LISA C OR) Take by mouth daily. (Patient not taking: Reported on 3/5/2024)       LYSINE OR Take by mouth daily. (Patient not taking: Reported on 3/5/2024)      Green Tea, Camillia sinensis, (GREEN TEA OR) Take by mouth daily. (Patient not taking: Reported on 3/5/2024)      Omega-3 Fatty Acids (OMEGA-3 FISH OIL OR) Take by mouth daily. (Patient not taking: Reported on 3/5/2024)      Misc Natural Products (OSTEO BI-FLEX ADV JOINT SHIELD) Oral Tab Take by mouth daily. (Patient not taking: Reported on 3/5/2024)      Digestive Enzymes (PAPAYA AND ENZYMES OR) Take by mouth daily. (Patient not taking: Reported on 3/5/2024)         Allergies:  No Known Allergies    Review of Systems:   A comprehensive 10-point review of systems was completed.  Pertinent positives and negatives are noted in the the HPI.    Physical Exam:   CONSTITUTIONAL: Well developed, well nourished, in no acute distress  NEUROLOGIC: Alert and oriented  HEAD: Normocephalic, atraumatic  EYES: Sclera non-icteric  ENT: Hearing intact, moist mucous membranes  NECK: No obvious goiter or masses  RESPIRATORY: Normal respiratory effort  SKIN: No evident rashes  ABDOMEN: Soft, non-tender, non-distended    Assessment & Plan:   Kirstin Myers is a 53 year old female with history of diabetes who was seen by SAIDA Wynn on 2/20/2024 for nephrolithiasis.  She presented with left flank pain and signs of infection.  CT 1/22/2024 shows moderate to severe hydronephrosis related to a large obstructing partial staghorn UPJ stone measuring 4.3 cm, as well as a 7 mm lower pole stone.  No right renal stones present.  She did have a stent placed by Dr. Pena on 1/22/2024.  She was unable to follow-up with [facility] due to insurance.    Today she feels well.  She gets occasional stent discomfort but minimal pain.  I discussed my recommendation of repeat CT scan and renal scan to evaluate improvement in hydronephrosis after stent placement and see the function of her left kidney.  I discussed that if she has a very low function of the kidney and  the nephrectomy may be a better route.  If she has reasonable function of the kidney then we can plan for PCNL to treat her large stone.    -Renal scan  -CT abdomen/pelvis without contrast  -Start oxybutynin 10 mg extended release daily for OAB symptoms with stent in place     In total, 30 minutes were spent on this patient encounter (including chart review, patient history, physical, and counseling, documentation, and communication).    Kip Ann MD  Staff Urologist  [facility]  Office: [phone]

## 2024-03-06 ENCOUNTER — TELEPHONE (OUTPATIENT)
Dept: SURGERY | Facility: CLINIC | Age: 54
End: 2024-03-06

## 2024-03-06 NOTE — TELEPHONE ENCOUNTER
Pt cannot have imaging done at [facility] due to [place] ins - asking for orders to be faxed to outside locations     [facility] for renal - fax [phone]      [facility] for CT -fax  [phone]

## 2024-03-07 RX ORDER — SULFAMETHOXAZOLE AND TRIMETHOPRIM 800; 160 MG/1; MG/1
1 TABLET ORAL 2 TIMES DAILY
Qty: 14 TABLET | Refills: 0 | Status: SHIPPED | OUTPATIENT
Start: 2024-03-07 | End: 2024-03-14

## 2024-03-07 NOTE — TELEPHONE ENCOUNTER
Glendy with [facility] received order, but indicates looks like something they do not do. Please call mobile at [phone],thanks.

## 2024-03-07 NOTE — TELEPHONE ENCOUNTER
This encounter is now closed.     Orders faxed to [facility] for renal lasix and [facility] for CT  per patient's request.

## 2024-03-07 NOTE — PROGRESS NOTES
Hakan Fulton,    I have reviewed your urine test results. Your urine test shows some bacteria so I will send you a course of antibiotics. Please let me know if you have any questions.    Thanks,  Kip Ann MD

## 2024-03-07 NOTE — TELEPHONE ENCOUNTER
This encounter is now closed.     RN called [facility] and spoke to Glendy. According to her they do not do CT or MRI at their facility. RN explained, it was patient's request to fax order to them.     RN made patient aware. Patient frustrated.   RN offered to provide [facility] information to be sent via [employer].   She agreed to plans.

## 2024-03-15 ENCOUNTER — TELEPHONE (OUTPATIENT)
Dept: SURGERY | Facility: CLINIC | Age: 54
End: 2024-03-15

## 2024-03-15 NOTE — TELEPHONE ENCOUNTER
Per pt left following mychart msg, asking to send order asap. Please send pt mychart once order is faxed. Thank you.         Hakan Naik     Could you please fax over my [place] Renal scan orders to [phone] ( [facility] - [place], [place] ) So I can get this last scan done ASAP. They need the order to schedule me.      Thank you   Faina

## 2024-03-20 ENCOUNTER — TELEPHONE (OUTPATIENT)
Dept: SURGERY | Facility: CLINIC | Age: 54
End: 2024-03-20

## 2024-03-22 ENCOUNTER — TELEPHONE (OUTPATIENT)
Dept: SURGERY | Facility: CLINIC | Age: 54
End: 2024-03-22

## 2024-03-25 ENCOUNTER — TELEPHONE (OUTPATIENT)
Dept: SURGERY | Facility: CLINIC | Age: 54
End: 2024-03-25

## 2024-03-26 ENCOUNTER — VIRTUAL PHONE E/M (OUTPATIENT)
Dept: SURGERY | Facility: CLINIC | Age: 54
End: 2024-03-26
Payer: MEDICAID

## 2024-03-26 ENCOUNTER — TELEPHONE (OUTPATIENT)
Dept: SURGERY | Facility: CLINIC | Age: 54
End: 2024-03-26

## 2024-03-26 DIAGNOSIS — N20.0 NEPHROLITHIASIS: Primary | ICD-10-CM

## 2024-03-26 PROCEDURE — 99441 PHONE E/M BY PHYS 5-10 MIN: CPT | Performed by: SURGERY

## 2024-03-26 NOTE — PROGRESS NOTES
Urology Clinic Note  Telemedicine Visit  Audio Only  The patient consented to performing this visit virtually.     Primary Care Provider:  Pcp, None     Chief Complaint:   Nephrolithiasis     HPI:   Kirstin Myers is a 54 year old female with history of diabetes who was seen by SAIDA Wynn on 2024 for nephrolithiasis.  She presented with left flank pain and signs of infection.  CT 2024 shows moderate to severe hydronephrosis related to a large obstructing partial staghorn UPJ stone measuring 4.3 cm, as well as a 7 mm lower pole stone.  No right renal stones present.  She did have a stent placed by Dr. Pena on 2024.  She was unable to follow-up with [facility] due to insurance.    At her last visit she was feeling well with occasional stent discomfort but this was minor.    Repeat CT scan showed complete resolution of hydronephrosis, less parenchymal thinning than prior.  MAG3 renal scan on 3/22/2024 showed 25% function of the left kidney and 75% function of the right kidney.  Post-Lasix left T1/2 was 26 minutes on the left.    Given decent function of the left kidney I discussed her options again of PCNL versus nephrectomy.  I do think it is worth saving his kidney and perform PCNL given 25% function.  The obstruction noted on renal scan could be related to the large stone burden.           PSA:  No results found for: \"PSA\", \"PERCENTPSA\", \"PSAS\", \"PSAULTRA\"     History:   No past medical history on file.    Past Surgical History:   Procedure Laterality Date          HERNIA SURGERY            REPAIR ING HERNIA,5+Y/O,REDUCIBL      VENTRAL HERNIA REPAIR N/A 2016    Procedure: HERNIA VENTRAL REPAIR;  Surgeon: Vijay Randall MD;  Location:  MAIN OR       Family History   Problem Relation Age of Onset    Cancer Mother     Heart Disease Maternal Grandfather        Social History     Socioeconomic History    Marital status:    Tobacco Use    Smoking status: Every Day      Packs/day: 1.00     Years: 30.00     Additional pack years: 0.00     Total pack years: 30.00     Types: Cigarettes     Passive exposure: Current   Vaping Use    Vaping Use: Some days   Substance and Sexual Activity    Alcohol use: No     Alcohol/week: 0.0 standard drinks of alcohol    Drug use: No     Social Determinants of Health     Food Insecurity: No Food Insecurity (1/22/2024)    Food Insecurity     Food Insecurity: Never true   Transportation Needs: No Transportation Needs (1/22/2024)    Transportation Needs     Lack of Transportation: No   Housing Stability: Low Risk  (1/22/2024)    Housing Stability     Housing Instability: No       Medications (Active prior to today's visit):  Current Outpatient Medications   Medication Sig Dispense Refill    oxybutynin ER 10 MG Oral Tablet 24 Hr Take 1 tablet (10 mg total) by mouth daily. 30 tablet 3    acetaminophen 325 MG Oral Tab Take 1 tablet (325 mg total) by mouth every 6 (six) hours as needed for Pain. (Patient not taking: Reported on 3/5/2024)      ibuprofen 200 MG Oral Tab Take 1 tablet (200 mg total) by mouth every 6 (six) hours as needed for Pain. (Patient not taking: Reported on 3/5/2024)      Naproxen Sodium (ALEVE OR) Take by mouth. (Patient not taking: Reported on 3/5/2024)      methylPREDNISolone (MEDROL) 4 MG Oral Tablet Therapy Pack Dosepack: take as directed (Patient not taking: Reported on 3/5/2024) 1 each 0    cetirizine 10 MG Oral Tab Take 1 tablet (10 mg total) by mouth daily. (Patient not taking: Reported on 3/5/2024)      Fluticasone Propionate 50 MCG/ACT Nasal Suspension 1 spray by Each Nare route daily.      B Complex Vitamins (B COMPLEX OR) Take by mouth daily. (Patient not taking: Reported on 3/5/2024)      BIOTIN OR Take by mouth daily. (Patient not taking: Reported on 3/5/2024)      Bromelains (BROMELAIN OR) Take by mouth daily.      CHROMIUM OR Take by mouth daily. (Patient not taking: Reported on 3/5/2024)      Coenzyme Q10 (COQ10 OR)  Take by mouth. (Patient not taking: Reported on 3/5/2024)      COLLAGEN OR Take by mouth daily.      Multiple Vitamins-Minerals (MULTIVITAMIN ADULT OR) Take by mouth daily. (Patient not taking: Reported on 3/5/2024)      GLUTAMINE OR Take by mouth daily.      LYSINE OR Take by mouth daily. (Patient not taking: Reported on 3/5/2024)      Green Tea, Camillia sinensis, (GREEN TEA OR) Take by mouth daily. (Patient not taking: Reported on 3/5/2024)      Omega-3 Fatty Acids (OMEGA-3 FISH OIL OR) Take by mouth daily. (Patient not taking: Reported on 3/5/2024)      Misc Natural Products (OSTEO BI-FLEX ADV JOINT SHIELD) Oral Tab Take by mouth daily. (Patient not taking: Reported on 3/5/2024)      Probiotic Product (PROBIOTIC DAILY OR) Take by mouth daily.         Allergies:  No Known Allergies    Review of Systems:   A comprehensive 10-point review of systems was completed.  Pertinent positives and negatives are noted in the the HPI.    Physical Exam:   No physical exam performed during this telephone encounter.    Assessment & Plan:   Kirstin Myers is a 54 year old female with history of diabetes who was seen by SAIDA Wynn on 2/20/2024 for nephrolithiasis.  She presented with left flank pain and signs of infection.  CT 1/22/2024 shows moderate to severe hydronephrosis related to a large obstructing partial staghorn UPJ stone measuring 4.3 cm, as well as a 7 mm lower pole stone.  No right renal stones present.  She did have a stent placed by Dr. Pena on 1/22/2024.  She was unable to follow-up with [facility] due to insurance.    At her last visit she was feeling well with occasional stent discomfort but this was minor.    Repeat CT scan showed complete resolution of hydronephrosis, less parenchymal thinning than prior.  MAG3 renal scan on 3/22/2024 showed 25% function of the left kidney and 75% function of the right kidney.  Post-Lasix left T1/2 was 26 minutes on the left.    Given decent function of the left  kidney I discussed her options again of PCNL versus nephrectomy.  I do think it is worth saving his kidney and perform PCNL given 25% function.  The obstruction noted on renal scan could be related to the large stone burden.    -OR for left PCNL, stent exchange  -Discussed risks of surgery including bleeding, infection, need for further procedures, damage to surrounding structures    In total, 5 minutes were spent on this patient encounter for medical discussion.    Kip Ann MD  Staff Urologist  [facility]  Office: [phone]

## 2024-03-26 NOTE — TELEPHONE ENCOUNTER
Hi,    Can you please schedule this patient for surgery.    Can you arrange for this patient to have a CBC, BMP, PT/INR, urine culture 1-2 weeks prior to surgery?    Hold ibuprofen, naproxen, aspirin 7 days prior.    Please arrange IR access morning of surgery (prefer lower pole access, has stent in place - OK to grab and pull back for help with access or I can remove if in the way).    Please have a laser available.    Thanks,  MB    Urology Surgery Request  Surgeon: Kip Ann  Location (if known): [facility]  Procedure: LEFT percutaneous nephrolithotomy (PCNL), possible ureteroscopy, ureteral stent exchange   Anesthesia: General   Time Frame: Next available  Time required: 150 minutes  Diagnosis: Nephrolithiasis  Special Equipment: PCNL equipment, laser    Antibiotics: per hospital protocol unless checked below   ___ Levaquin 500 mg IV   ___ Gemcitabine 2 g/100 mL NS bladder instillation to be given in OR    Estimated Post Op/Follow Up Appt:   1 weeks for cysto/stent removal visit. Please schedule appointment at time of surgery scheduling.

## 2024-03-29 RX ORDER — OXYBUTYNIN CHLORIDE 10 MG/1
10 TABLET, EXTENDED RELEASE ORAL DAILY
Qty: 30 TABLET | Refills: 3 | Status: SHIPPED | OUTPATIENT
Start: 2024-03-29

## 2024-04-05 ENCOUNTER — TELEPHONE (OUTPATIENT)
Dept: SURGERY | Facility: CLINIC | Age: 54
End: 2024-04-05

## 2024-04-05 ENCOUNTER — HOSPITAL ENCOUNTER (OUTPATIENT)
Facility: HOSPITAL | Age: 54
End: 2024-04-05
Attending: SURGERY | Admitting: SURGERY

## 2024-04-05 DIAGNOSIS — N20.0 NEPHROLITHIASIS: Primary | ICD-10-CM

## 2024-04-05 NOTE — TELEPHONE ENCOUNTER
RN called patient. She is a S/P cysto, lt retrograde, pyelogram, insertion of lt ureter with Dr Quiros on 1/22 and she has an upcoming surgery on 5/22 with Dr Ann for lt percutaneous nephrolithotomy, possible ureteroscopy ureteral stent exchange with IR access prior.     Patient concern of the surgery on 5/22. Wanting to know if it is safe to keep the stent that long. Per Dr Quiros, stent should not be placed for more than 3 months. Pls call or send message via [employer]. Ok with patient.

## 2024-04-05 NOTE — TELEPHONE ENCOUNTER
Hi,    Can you please schedule this patient for surgery.    Also, can you arrange for the patient to drop a urine sample for urine culture 1-2 weeks prior to the scheduled surgery date?     Thanks,  Kip     Urology Surgery Request  Surgeon: Kip Ann  Location (if known): [facility]  Procedure: Cystoscopy, left retrograde pyelogram and ureteral stent exchange  Anesthesia: MAC/sedation  Time Frame: Next 1-2 weeks (try for April 15 or 17, can use open or cysto room)  Time required: 30 minutes  Diagnosis: Nephrolithiasis  Special Equipment: C-arm,    Antibiotics: per hospital protocol unless checked below   ___ Levaquin 500 mg IV   ___ Gemcitabine 2 g/100 mL NS bladder instillation to be given in OR

## 2024-04-08 NOTE — TELEPHONE ENCOUNTER
Spoke with the patient.  Scheduling the surgery for 4/15/24.  Reviewed the pre-op instructions and will send via My Chart or mail to patient once confirmed.  Patient can contact me at [phone]

## 2024-04-09 ENCOUNTER — TELEPHONE (OUTPATIENT)
Dept: SURGERY | Facility: CLINIC | Age: 54
End: 2024-04-09

## 2024-04-09 DIAGNOSIS — N20.0 KIDNEY STONE: ICD-10-CM

## 2024-04-09 DIAGNOSIS — R82.90 URINE FINDING: Primary | ICD-10-CM

## 2024-04-09 NOTE — TELEPHONE ENCOUNTER
Patient has appointment today at 1:50 pm and calling to ensure her order will be faxed now. Please update patient by calling or by mychart, thanks.  *trying rn line

## 2024-04-09 NOTE — TELEPHONE ENCOUNTER
Per pt needs her urine culture order to be sent to [facility] in [place]: fax; [phone]. Please advise

## 2024-04-09 NOTE — TELEPHONE ENCOUNTER
[facility], ins verifier calling regarding 4/15, patient has Medicaid [place] and is not authorized and would need prior authorization or would need to go to in network facility, thanks.

## 2024-04-11 RX ORDER — SULFAMETHOXAZOLE AND TRIMETHOPRIM 800; 160 MG/1; MG/1
1 TABLET ORAL 2 TIMES DAILY
Qty: 14 TABLET | Refills: 0 | Status: SHIPPED | OUTPATIENT
Start: 2024-04-11 | End: 2024-04-18

## 2024-04-11 NOTE — PROGRESS NOTES
Plan for left PCNL on 4/15/2024.  Urine culture preliminarily growing E. coli, sensitivities pending.  Prior cultures have been sensitive to Bactrim.  I will send her Bactrim to start and we will follow-up urine culture to see if this is sensitive.  I called patient to discuss this and she will  antibiotics and start them as planned.

## 2024-04-12 ENCOUNTER — TELEPHONE (OUTPATIENT)
Dept: SURGERY | Facility: CLINIC | Age: 54
End: 2024-04-12

## 2024-04-12 LAB
ALBUMIN/GLOBULIN RATIO: 1.3 (CALC) (ref 1–2.5)
ALBUMIN: 4.1 G/DL (ref 3.6–5.1)
ALKALINE PHOSPHATASE: 87 U/L (ref 37–153)
ALT: 14 U/L (ref 6–29)
AST: 15 U/L (ref 10–35)
BILIRUBIN, TOTAL: 0.4 MG/DL (ref 0.2–1.2)
BUN/CREATININE RATIO: 21 (CALC) (ref 6–22)
BUN: 25 MG/DL (ref 7–25)
CALCIUM: 10.1 MG/DL (ref 8.6–10.4)
CARBON DIOXIDE: 29 MMOL/L (ref 20–32)
CHLORIDE: 102 MMOL/L (ref 98–110)
CREATININE: 1.2 MG/DL (ref 0.5–1.03)
EGFR: 53 ML/MIN/1.73M2
GLOBULIN: 3.2 G/DL (CALC) (ref 1.9–3.7)
GLUCOSE: 123 MG/DL (ref 65–99)
HEMATOCRIT: 45.1 % (ref 35–45)
HEMOGLOBIN: 14.7 G/DL (ref 11.7–15.5)
MCH: 28.9 PG (ref 27–33)
MCHC: 32.6 G/DL (ref 32–36)
MCV: 88.8 FL (ref 80–100)
MPV: 10.1 FL (ref 7.5–12.5)
PLATELET COUNT: 394 THOUSAND/UL (ref 140–400)
POTASSIUM: 5 MMOL/L (ref 3.5–5.3)
PROTEIN, TOTAL: 7.3 G/DL (ref 6.1–8.1)
RDW: 14.6 % (ref 11–15)
RED BLOOD CELL COUNT: 5.08 MILLION/UL (ref 3.8–5.1)
SODIUM: 138 MMOL/L (ref 135–146)
WHITE BLOOD CELL COUNT: 8.4 THOUSAND/UL (ref 3.8–10.8)

## 2024-04-12 NOTE — TELEPHONE ENCOUNTER
Per pt wants to speak to Dr. Ann to verify if she has surgery Monday stating there is discrepancies with the messages being sent by staff. Please advise   .

## 2024-04-14 ENCOUNTER — ANESTHESIA EVENT (OUTPATIENT)
Dept: SURGERY | Facility: HOSPITAL | Age: 54
End: 2024-04-14
Payer: MEDICAID

## 2024-04-15 ENCOUNTER — HOSPITAL ENCOUNTER (OUTPATIENT)
Facility: HOSPITAL | Age: 54
Setting detail: HOSPITAL OUTPATIENT SURGERY
Discharge: HOME | End: 2024-04-15
Attending: SURGERY | Admitting: SURGERY
Payer: MEDICAID

## 2024-04-15 ENCOUNTER — ANESTHESIA (OUTPATIENT)
Dept: SURGERY | Facility: HOSPITAL | Age: 54
End: 2024-04-15
Payer: MEDICAID

## 2024-04-15 ENCOUNTER — APPOINTMENT (OUTPATIENT)
Dept: GENERAL RADIOLOGY | Facility: HOSPITAL | Age: 54
End: 2024-04-15
Attending: SURGERY
Payer: MEDICAID

## 2024-04-15 VITALS
HEIGHT: 68 IN | SYSTOLIC BLOOD PRESSURE: 125 MMHG | DIASTOLIC BLOOD PRESSURE: 69 MMHG | BODY MASS INDEX: 43.5 KG/M2 | HEART RATE: 71 BPM | OXYGEN SATURATION: 95 % | WEIGHT: 287 LBS | TEMPERATURE: 97 F | RESPIRATION RATE: 14 BRPM

## 2024-04-15 DIAGNOSIS — N20.0 NEPHROLITHIASIS: Primary | ICD-10-CM

## 2024-04-15 PROBLEM — N21.0 BLADDER STONE: Status: ACTIVE | Noted: 2024-04-15

## 2024-04-15 LAB — B-HCG UR QL: NEGATIVE

## 2024-04-15 PROCEDURE — 0T778DZ DILATION OF LEFT URETER WITH INTRALUMINAL DEVICE, VIA NATURAL OR ARTIFICIAL OPENING ENDOSCOPIC: ICD-10-PCS | Performed by: SURGERY

## 2024-04-15 PROCEDURE — 52318 REMOVE BLADDER STONE: CPT | Performed by: SURGERY

## 2024-04-15 PROCEDURE — 74420 UROGRAPHY RTRGR +-KUB: CPT | Performed by: SURGERY

## 2024-04-15 PROCEDURE — 52332 CYSTOSCOPY AND TREATMENT: CPT | Performed by: SURGERY

## 2024-04-15 PROCEDURE — 0TCB8ZZ EXTIRPATION OF MATTER FROM BLADDER, VIA NATURAL OR ARTIFICIAL OPENING ENDOSCOPIC: ICD-10-PCS | Performed by: SURGERY

## 2024-04-15 PROCEDURE — 76000 FLUOROSCOPY <1 HR PHYS/QHP: CPT | Performed by: SURGERY

## 2024-04-15 DEVICE — URETERAL STENT
Type: IMPLANTABLE DEVICE | Site: URETER | Status: FUNCTIONAL
Brand: ASCERTA™

## 2024-04-15 RX ORDER — SCOLOPAMINE TRANSDERMAL SYSTEM 1 MG/1
1 PATCH, EXTENDED RELEASE TRANSDERMAL ONCE
Status: DISCONTINUED | OUTPATIENT
Start: 2024-04-15 | End: 2024-04-15 | Stop reason: HOSPADM

## 2024-04-15 RX ORDER — SODIUM CHLORIDE, SODIUM LACTATE, POTASSIUM CHLORIDE, CALCIUM CHLORIDE 600; 310; 30; 20 MG/100ML; MG/100ML; MG/100ML; MG/100ML
INJECTION, SOLUTION INTRAVENOUS CONTINUOUS
Status: DISCONTINUED | OUTPATIENT
Start: 2024-04-15 | End: 2024-04-15

## 2024-04-15 RX ORDER — ONDANSETRON 2 MG/ML
4 INJECTION INTRAMUSCULAR; INTRAVENOUS EVERY 6 HOURS PRN
Status: DISCONTINUED | OUTPATIENT
Start: 2024-04-15 | End: 2024-04-15

## 2024-04-15 RX ORDER — HYDROCODONE BITARTRATE AND ACETAMINOPHEN 10; 325 MG/1; MG/1
2 TABLET ORAL ONCE AS NEEDED
Status: DISCONTINUED | OUTPATIENT
Start: 2024-04-15 | End: 2024-04-15

## 2024-04-15 RX ORDER — CEFAZOLIN SODIUM 1 G/3ML
INJECTION, POWDER, FOR SOLUTION INTRAMUSCULAR; INTRAVENOUS AS NEEDED
Status: DISCONTINUED | OUTPATIENT
Start: 2024-04-15 | End: 2024-04-15 | Stop reason: SURG

## 2024-04-15 RX ORDER — DEXAMETHASONE SODIUM PHOSPHATE 4 MG/ML
VIAL (ML) INJECTION AS NEEDED
Status: DISCONTINUED | OUTPATIENT
Start: 2024-04-15 | End: 2024-04-15 | Stop reason: SURG

## 2024-04-15 RX ORDER — MIDAZOLAM HYDROCHLORIDE 1 MG/ML
1 INJECTION INTRAMUSCULAR; INTRAVENOUS EVERY 5 MIN PRN
Status: DISCONTINUED | OUTPATIENT
Start: 2024-04-15 | End: 2024-04-15

## 2024-04-15 RX ORDER — PHENYLEPHRINE HCL 10 MG/ML
VIAL (ML) INJECTION AS NEEDED
Status: DISCONTINUED | OUTPATIENT
Start: 2024-04-15 | End: 2024-04-15 | Stop reason: SURG

## 2024-04-15 RX ORDER — HYDROMORPHONE HYDROCHLORIDE 1 MG/ML
0.6 INJECTION, SOLUTION INTRAMUSCULAR; INTRAVENOUS; SUBCUTANEOUS EVERY 5 MIN PRN
Status: DISCONTINUED | OUTPATIENT
Start: 2024-04-15 | End: 2024-04-15

## 2024-04-15 RX ORDER — CEFAZOLIN SODIUM/WATER 2 G/20 ML
2 SYRINGE (ML) INTRAVENOUS ONCE
Status: DISCONTINUED | OUTPATIENT
Start: 2024-04-15 | End: 2024-04-15 | Stop reason: HOSPADM

## 2024-04-15 RX ORDER — ACETAMINOPHEN 500 MG
1000 TABLET ORAL ONCE AS NEEDED
Status: DISCONTINUED | OUTPATIENT
Start: 2024-04-15 | End: 2024-04-15

## 2024-04-15 RX ORDER — HYDROMORPHONE HYDROCHLORIDE 1 MG/ML
0.4 INJECTION, SOLUTION INTRAMUSCULAR; INTRAVENOUS; SUBCUTANEOUS EVERY 5 MIN PRN
Status: DISCONTINUED | OUTPATIENT
Start: 2024-04-15 | End: 2024-04-15

## 2024-04-15 RX ORDER — ONDANSETRON 2 MG/ML
INJECTION INTRAMUSCULAR; INTRAVENOUS AS NEEDED
Status: DISCONTINUED | OUTPATIENT
Start: 2024-04-15 | End: 2024-04-15 | Stop reason: SURG

## 2024-04-15 RX ORDER — MEPERIDINE HYDROCHLORIDE 25 MG/ML
12.5 INJECTION INTRAMUSCULAR; INTRAVENOUS; SUBCUTANEOUS AS NEEDED
Status: DISCONTINUED | OUTPATIENT
Start: 2024-04-15 | End: 2024-04-15

## 2024-04-15 RX ORDER — LIDOCAINE HYDROCHLORIDE 10 MG/ML
INJECTION, SOLUTION EPIDURAL; INFILTRATION; INTRACAUDAL; PERINEURAL AS NEEDED
Status: DISCONTINUED | OUTPATIENT
Start: 2024-04-15 | End: 2024-04-15 | Stop reason: SURG

## 2024-04-15 RX ORDER — PROCHLORPERAZINE EDISYLATE 5 MG/ML
5 INJECTION INTRAMUSCULAR; INTRAVENOUS EVERY 8 HOURS PRN
Status: DISCONTINUED | OUTPATIENT
Start: 2024-04-15 | End: 2024-04-15

## 2024-04-15 RX ORDER — HYDROCODONE BITARTRATE AND ACETAMINOPHEN 10; 325 MG/1; MG/1
1 TABLET ORAL ONCE AS NEEDED
Status: DISCONTINUED | OUTPATIENT
Start: 2024-04-15 | End: 2024-04-15

## 2024-04-15 RX ORDER — KETOROLAC TROMETHAMINE 30 MG/ML
INJECTION, SOLUTION INTRAMUSCULAR; INTRAVENOUS AS NEEDED
Status: DISCONTINUED | OUTPATIENT
Start: 2024-04-15 | End: 2024-04-15 | Stop reason: SURG

## 2024-04-15 RX ORDER — HYDROMORPHONE HYDROCHLORIDE 1 MG/ML
0.2 INJECTION, SOLUTION INTRAMUSCULAR; INTRAVENOUS; SUBCUTANEOUS EVERY 5 MIN PRN
Status: DISCONTINUED | OUTPATIENT
Start: 2024-04-15 | End: 2024-04-15

## 2024-04-15 RX ORDER — ACETAMINOPHEN 500 MG
1000 TABLET ORAL ONCE
Status: DISCONTINUED | OUTPATIENT
Start: 2024-04-15 | End: 2024-04-15 | Stop reason: HOSPADM

## 2024-04-15 RX ORDER — NALOXONE HYDROCHLORIDE 0.4 MG/ML
0.08 INJECTION, SOLUTION INTRAMUSCULAR; INTRAVENOUS; SUBCUTANEOUS AS NEEDED
Status: DISCONTINUED | OUTPATIENT
Start: 2024-04-15 | End: 2024-04-15

## 2024-04-15 RX ADMIN — SODIUM CHLORIDE, SODIUM LACTATE, POTASSIUM CHLORIDE, CALCIUM CHLORIDE: 600; 310; 30; 20 INJECTION, SOLUTION INTRAVENOUS at 15:44:00

## 2024-04-15 RX ADMIN — KETOROLAC TROMETHAMINE 30 MG: 30 INJECTION, SOLUTION INTRAMUSCULAR; INTRAVENOUS at 16:35:00

## 2024-04-15 RX ADMIN — CEFAZOLIN SODIUM 3 G: 1 INJECTION, POWDER, FOR SOLUTION INTRAMUSCULAR; INTRAVENOUS at 16:04:00

## 2024-04-15 RX ADMIN — DEXAMETHASONE SODIUM PHOSPHATE 8 MG: 4 MG/ML VIAL (ML) INJECTION at 16:08:00

## 2024-04-15 RX ADMIN — LIDOCAINE HYDROCHLORIDE 50 MG: 10 INJECTION, SOLUTION EPIDURAL; INFILTRATION; INTRACAUDAL; PERINEURAL at 16:00:00

## 2024-04-15 RX ADMIN — ONDANSETRON 4 MG: 2 INJECTION INTRAMUSCULAR; INTRAVENOUS at 16:16:00

## 2024-04-15 RX ADMIN — PHENYLEPHRINE HCL 100 MCG: 10 MG/ML VIAL (ML) INJECTION at 16:27:00

## 2024-04-15 NOTE — ANESTHESIA PREPROCEDURE EVALUATION
PRE-OP EVALUATION    Patient Name: Kirstin Myers    Admit Diagnosis: Nephrolithiasis [N20.0]    Pre-op Diagnosis: Nephrolithiasis [N20.0]    CYSTOSCOPY, LEFT RETROGRADE PYELOGRAM WITH  URETERAL STENT EXCHANGE    Anesthesia Procedure: CYSTOSCOPY, LEFT RETROGRADE PYELOGRAM WITH  URETERAL STENT EXCHANGE (Left)    Surgeons and Role:     * Kip Ann MD - Primary    Pre-op vitals reviewed.  Temp: 99 °F (37.2 °C)  Pulse: 88  Resp: 20  BP: 127/67  SpO2: 93 %  Body mass index is 43.64 kg/m².    Current medications reviewed.  Hospital Medications:   acetaminophen (Tylenol Extra Strength) tab 1,000 mg  1,000 mg Oral Once    scopolamine (Transderm-Scop) 1 MG/3DAYS patch 1 patch  1 patch Transdermal Once    lactated ringers infusion   Intravenous Continuous    ceFAZolin (Ancef) 2 g in 20mL IV syringe premix  2 g Intravenous Once       Outpatient Medications:     Medications Prior to Admission   Medication Sig Dispense Refill Last Dose    sulfamethoxazole-trimethoprim -160 MG Oral Tab per tablet Take 1 tablet by mouth 2 (two) times daily for 7 days. 14 tablet 0 4/15/2024 at 1130    NON FORMULARY Beet root juice       oxybutynin ER 10 MG Oral Tablet 24 Hr Take 1 tablet (10 mg total) by mouth daily. 30 tablet 3 4/13/2024    acetaminophen 325 MG Oral Tab Take 1 tablet (325 mg total) by mouth every 6 (six) hours as needed for Pain.   4/15/2024 at 1130    Fluticasone Propionate 50 MCG/ACT Nasal Suspension 1 spray by Each Nare route daily.       Bromelains (BROMELAIN OR) Take by mouth daily.       COLLAGEN OR Take by mouth daily.       Multiple Vitamins-Minerals (MULTIVITAMIN ADULT OR) Take by mouth daily.       GLUTAMINE OR Take by mouth daily.       Probiotic Product (PROBIOTIC DAILY OR) Take by mouth daily.   4/13/2024    ibuprofen 200 MG Oral Tab Take 1 tablet (200 mg total) by mouth every 6 (six) hours as needed for Pain. (Patient not taking: Reported on 3/5/2024)       Naproxen Sodium (ALEVE OR) Take by mouth.  (Patient not taking: Reported on 3/5/2024)       cetirizine 10 MG Oral Tab Take 1 tablet (10 mg total) by mouth daily. (Patient not taking: Reported on 3/5/2024)       B Complex Vitamins (B COMPLEX OR) Take by mouth daily. (Patient not taking: Reported on 3/5/2024)       BIOTIN OR Take by mouth daily. (Patient not taking: Reported on 3/5/2024)       CHROMIUM OR Take by mouth daily. (Patient not taking: Reported on 3/5/2024)       Coenzyme Q10 (COQ10 OR) Take by mouth. (Patient not taking: Reported on 3/5/2024)       LYSINE OR Take by mouth daily. (Patient not taking: Reported on 3/5/2024)       Green Tea, Camillia sinensis, (GREEN TEA OR) Take by mouth daily. (Patient not taking: Reported on 3/5/2024)       Omega-3 Fatty Acids (OMEGA-3 FISH OIL OR) Take by mouth daily. (Patient not taking: Reported on 3/5/2024)       Misc Natural Products (OSTEO BI-FLEX ADV JOINT SHIELD) Oral Tab Take by mouth daily. (Patient not taking: Reported on 3/5/2024)          Allergies: Patient has no known allergies.      Anesthesia Evaluation        Anesthetic Complications           GI/Hepatic/Renal                                 Cardiovascular                (+) obesity                                       Endo/Other                                  Pulmonary                           Neuro/Psych                                      Past Surgical History:   Procedure Laterality Date          Hernia surgery            Repair ing hernia,5+y/o,reducibl      Ventral hernia repair N/A 2016    Procedure: HERNIA VENTRAL REPAIR;  Surgeon: Vijay Randall MD;  Location:  MAIN OR     Social History     Socioeconomic History    Marital status:    Tobacco Use    Smoking status: Every Day     Current packs/day: 1.00     Average packs/day: 1 pack/day for 30.0 years (30.0 ttl pk-yrs)     Types: Cigarettes     Passive exposure: Current   Vaping Use    Vaping status: Former   Substance and Sexual Activity    Alcohol use: No      Alcohol/week: 0.0 standard drinks of alcohol    Drug use: No     History   Drug Use No     Available pre-op labs reviewed.  Lab Results   Component Value Date    WBC 8.4 04/11/2024    RBC 5.08 04/11/2024    HGB 14.7 04/11/2024    HCT 45.1 (H) 04/11/2024    MCV 88.8 04/11/2024    MCH 28.9 04/11/2024    MCHC 32.6 04/11/2024    RDW 14.6 04/11/2024     04/11/2024     Lab Results   Component Value Date     04/11/2024    K 5.0 04/11/2024     04/11/2024    CO2 29 04/11/2024    BUN 25 04/11/2024    CREATSERUM 1.20 (H) 04/11/2024     (H) 04/11/2024    CA 10.1 04/11/2024            Airway      Mallampati: III  Mouth opening: >3 FB  TM distance: 4 - 6 cm  Neck ROM: full Cardiovascular    Cardiovascular exam normal.         Dental  Comment: Dentition appears grossly intact. Patient denies any loose, chipped or missing teeth other than as noted. Risks of dental trauma related to anesthesia including intubation and during emergence explained.             Pulmonary    Pulmonary exam normal.                 Other findings              ASA: 2   Plan: general  NPO status verified and patient meets guidelines.    Post-procedure pain management plan discussed with surgeon and patient.    Comment: Options, risks and benefits of anesthesia as outlined in the anesthesia consent were reviewed with the patient. Risks and benefits of GA including sore throat, allergy, nausea, vomiting, dental trauma, pain management modalities were all discussed. Particularly the risk of dental trauma with weakened teeth or crowns, partials, fillings and any non natural teeth due to instrumentation of oral cavity and airway. Patient understands risks and verbally agreed to proceed. All questions answered.The consent was signed without further questions.      Plan/risks discussed with: patient                Present on Admission:  **None**

## 2024-04-15 NOTE — ANESTHESIA POSTPROCEDURE EVALUATION
[facility]    Kirstin Myers Patient Status:  Hospital Outpatient Surgery   Age/Gender 54 year old female MRN [id number]   Location [facility] SURGERY Attending Kip Ann MD   Hosp Day # 0 PCP None Pcp       Anesthesia Post-op Note    CYSTOSCOPY, LEFT RETROGRADE PYELOGRAM WITH  URETERAL STENT EXCHANGE, CYSTOLITHOPAXY    Procedure Summary       Date: 04/15/24 Room / Location:  MAIN OR 03 / [facility] MAIN OR    Anesthesia Start: 1556 Anesthesia Stop: 1655    Procedure: CYSTOSCOPY, LEFT RETROGRADE PYELOGRAM WITH  URETERAL STENT EXCHANGE, CYSTOLITHOPAXY (Left: Ureter) Diagnosis:       Nephrolithiasis      (Nephrolithiasis [N20.0])    Surgeons: Kip Ann MD Anesthesiologist: Karan Asencio MD    Anesthesia Type: general ASA Status: 2            Anesthesia Type: No value filed.    Vitals Value Taken Time   /90 04/15/24 1658   Temp 97.3 04/15/24 1658   Pulse 76 04/15/24 1658   Resp 20 04/15/24 1658   SpO2 95 04/15/24 1658       Patient Location: PACU    Anesthesia Type: general    Airway Patency: patent    Postop Pain Control: adequate    Mental Status: preanesthetic baseline    Nausea/Vomiting: none    Cardiopulmonary/Hydration status: stable euvolemic    Complications: no apparent anesthesia related complications    Postop vital signs: stable    Dental Exam: Unchanged from Preop    Patient to be discharged from PACU when criteria met.

## 2024-04-15 NOTE — DISCHARGE INSTRUCTIONS
You had cystoscopy and stent exchange in the operating room today.    The portion of the stent in the bladder was covered in stone encrustation, so I had to crush this up in order to change the stent so it's good we did this procedure today in preparation for your upcoming surgery.    Instructions:    - No heavy lifting or strenuous activity for 1 day. You may resume regular activity tomorrow.  With increased activity you may notice more blood in the urine from stent movement inside the bladder.    - Your follow-up appointment for surgery is listed below. Please contact us at [phone] if you need to change your appointment.    Your appointments       Date & Time Appointment Department ([place])    May 22, 2024 10:30 AM CDT IVS IR with [facility] IVS IR [facility] ([facility])    When you arrive, park in the [facility], then proceed to the ground floor of the [facility] and check in at the [facility] registration desk so we know you have arrived (even if you already completed eCheck-in online).     Your physician or physician's representative will follow-up with you and your loved one by phone after you are discharged.    A nurse from the Interventional Suites Department will be calling you prior to your procedure to perform a health history screening and give you instructions.  The time the nurse assigns you to arrive will be one hour prior to your procedure.  This will allow plenty of time to register and to get you prepared for your procedure.     If you have any questions, please call [phone].  We are available Monday through Friday, 8:00 AM to 5:00 PM.      May 22, 2024 10:30 AM CDT IVS IR with  RADIOLOGIST SPEC IR XR [facility] X-ray ([facility])    When you arrive, park in the [facility], then proceed to the ground floor of the [facility] and check in at the [facility] registration desk so we  know you have arrived (even if you already completed eCheck-in online).     Your physician or physician's representative will follow-up with you and your loved one by phone after you are discharged.    A nurse from the Interventional Suites Department will be calling you prior to your procedure to perform a health history screening and give you instructions.  The time the nurse assigns you to arrive will be one hour prior to your procedure.  This will allow plenty of time to register and to get you prepared for your procedure.     If you have any questions, please call [phone].  We are available Monday through Friday, 8:00 AM to 5:00 PM.      May 30, 2024 1:00 PM CDT Procedure with Kip Ann MD [facility] ([place] [address])              [facility]  [address] [id number] [phone] [facility] X-ray  [address] [id number] [phone] [place] [address]  [address]  [facility] [id number] [phone]           - With a ureteral stent in place you may have some discomfort on your side/flank. You can feel pain worsen when you urinate, so try to avoid holding urine and void every 2-3 hours. You also may notice increased blood in the urine as you increase your activity. If this happens increase your hydration and take it easy for a day. Warm baths/hot packs can help with the discomfort as well as your prescribed medications and Advil/Motrin (if you are able to take these).     - You may experience mild pain after the procedure for a few days.  If the pain becomes intolerable please contact our office or go to the nearest Emergency Room or Urgent Care. You should take over the counter ibuprofen (AKA motrin, advil) for mild pain (provided you do not  have a medical condition such as stomach ulcers or kidney disease which prohibits you from taking these). You may alternate this with tylenol as well. If pain is still not relieved by tylenol and/or ibuprofen, you may take narcotic pain medication if prescribed (typically oxycodone or tramadol). If you are taking narcotic pain medication this can make you constipated, so you should take over the counter stool softeners or miralax if prescribed.    - Warm packs over the lower abdomen or hot baths often help with discomfort after cystoscopy.    - If you take blood thinners (such as aspirin or plavix) please hold these medications until 3 days after surgery.     - You may experience burning and frequency of urination over the next few days. This will improve after a few days if you stay well hydrated. If you were prescribed phenazopyridine (Pyridium) this may relieve urinary discomfort but you can only take this for 3 days. Pyridium will make your urine orange.     - You are likely to see some blood in your urine (pink or light red urine) that should clear up within a few days. Staying well hydrated should help this clear up. If you notice the urine stays dark red or there are multiple large blood clots despite good hydration, please call the urology clinic ([phone]).     - Try to abstain from alcohol, coffee, tea, artificial sweeteners, and spicy food for the next 48 hours as these can irritate the bladder.     - If you develop fevers / chills, difficulty urinating, or abdominal pain that does not improve with pain medications, please call the office.     - Drink 1.5 to 2 liters of fluid today (water is preferable). If you are on a fluid restriction due to other medical reasons then you need to adhere to your fluid restriction recommendations.    Kip Ann MD  Staff Urologist  [facility]  Office: [phone]    You received a drug called Toradol which is an Anti Inflammatory at: 4:35pm  If you  are allowed to take Anti inflammatories:    Do not take any Anti Inflammatory like Motrin, Aleve or Ibuprophen until after: 10:35pm  Please report any suspected allergic reactions or bleeding issues to your doctor

## 2024-04-15 NOTE — H&P
UROLOGY PRE-OPERATIVE HISTORY & PHYSICAL      Kirstin Myers Patient Status:  Hospital Outpatient Surgery    3/24/1970 MRN [id number]   Location [facility] PERIOPERATIVE SERVICE Attending Kip Ann MD   Hosp Day # 0 PCP None Pcp     Primary Care Provider: Pcp, None     Chief Complaint:   Nephrolithiasis     HPI:   Kirstin Myers is a 54 year old female with history of diabetes who was seen by SAIDA Wynn on 2024 for nephrolithiasis.  She presented with left flank pain and signs of infection.  CT 2024 shows moderate to severe hydronephrosis related to a large obstructing partial staghorn UPJ stone measuring 4.3 cm, as well as a 7 mm lower pole stone.  No right renal stones present.  She did have a stent placed by Dr. Pena on 2024.  She was unable to follow-up with [facility] due to insurance.     Repeat CT scan showed complete resolution of hydronephrosis, less parenchymal thinning than prior.  MAG3 renal scan on 3/22/2024 showed 25% function of the left kidney and 75% function of the right kidney.  Post-Lasix left T1/2 was 26 minutes on the left.     Given decent function of the left kidney I discussed her options again of PCNL versus nephrectomy.  I do think it is worth saving his kidney and perform PCNL given 25% function.  The obstruction noted on renal scan could be related to the large stone burden.    Scheduled for PCNL on 24. Here today for stent exchange given stent placed ~ 3 months ago.    UCx grew E coli sensitive to Bactrim - treated pre-op with Bactrim.    History:     Past Medical History:    Calculus of kidney    Prediabetes    Visual impairment    GLASSES       Past Surgical History:   Procedure Laterality Date          Hernia surgery            Repair ing hernia,5+y/o,reducibl      Ventral hernia repair N/A 2016    Procedure: HERNIA VENTRAL REPAIR;  Surgeon: Vijay Randall MD;  Location:  MAIN OR       Family History   Problem  Relation Age of Onset    Cancer Mother     Heart Disease Maternal Grandfather        Social History     Socioeconomic History    Marital status:    Tobacco Use    Smoking status: Every Day     Current packs/day: 1.00     Average packs/day: 1 pack/day for 30.0 years (30.0 ttl pk-yrs)     Types: Cigarettes     Passive exposure: Current   Vaping Use    Vaping status: Former   Substance and Sexual Activity    Alcohol use: No     Alcohol/week: 0.0 standard drinks of alcohol    Drug use: No     Social Determinants of Health     Food Insecurity: No Food Insecurity (1/22/2024)    Food Insecurity     Food Insecurity: Never true   Transportation Needs: No Transportation Needs (1/22/2024)    Transportation Needs     Lack of Transportation: No   Housing Stability: Low Risk  (1/22/2024)    Housing Stability     Housing Instability: No       Medications:  No current outpatient medications on file.       Allergies:  No Known Allergies    Review of Systems:   A comprehensive 10-point review of systems was completed.  Pertinent positives and negatives are noted in the the HPI.    Physical Exam:   Vital Signs:  Height 5' 8\" (1.727 m), weight 260 lb (117.9 kg), last menstrual period 03/04/2024.     CONSTITUTIONAL: Well developed, well nourished, in no acute distress  NEUROLOGIC: Alert and oriented  HEAD: Normocephalic, atraumatic  EYES: Sclera non-icteric  ENT: Hearing intact, moist mucous membranes  NECK: No obvious goiter or masses  RESPIRATORY: Normal respiratory effort  SKIN: No evident rashes  ABDOMEN: Soft, non-tender, non-distended    Laboratory Data:  Lab Results   Component Value Date    WBC 8.4 04/11/2024    HGB 14.7 04/11/2024     04/11/2024     Lab Results   Component Value Date     04/11/2024    K 5.0 04/11/2024     04/11/2024    CO2 29 04/11/2024    BUN 25 04/11/2024     (H) 04/11/2024    AST 15 04/11/2024    ALT 14 04/11/2024    TP 7.3 04/11/2024    ALB 4.1 04/11/2024    CA 10.1  04/11/2024       Urinalysis Results (last three years):  Recent Labs     01/22/24  0050 02/20/24  1209 03/05/24  1321   COLORUR Yellow  --   --    CLARITY Turbid*  --   --    SPECGRAVITY 1.020 1.025 1.025   PHURINE 6.5 6.0 7.0   PROUR 50*  --   --    GLUUR Normal  --   --    KETUR Negative  --   --    BILUR Negative  --   --    BLOODURINE Negative  --   --    NITRITE 2+* Negative Negative   UROBILINOGEN 3*  --   --    LEUUR 500*  --   --    WBCUR >50*  --   --    RBCUR 3-5*  --   --    BACUR 3+*  --   --        Urine Culture Results (last three years):  Lab Results   Component Value Date    URINECUL  03/05/2024     >100,000 cfu/ml Multiple species present- probable contamination.    URINECUL No Growth 2 Days 02/20/2024    URINECUL >100,000 CFU/ML Escherichia coli (A) 01/22/2024    URINECUL >100,000 CFU/ML Proteus mirabilis (A) 01/22/2024    URINECUL >100,000 CFU/ML Escherichia coli (A) 01/22/2024       PSA:  No results found for: \"PSA\", \"PERCENTPSA\", \"PSAS\", \"PSAULTRA\"     Imaging (last three days):  No results found.     Assessment:   Kirstin Myers is a 54 year old female with history of diabetes who was seen by SAIDA Wynn on 2/20/2024 for nephrolithiasis.  She presented with left flank pain and signs of infection.  CT 1/22/2024 shows moderate to severe hydronephrosis related to a large obstructing partial staghorn UPJ stone measuring 4.3 cm, as well as a 7 mm lower pole stone.  No right renal stones present.  She did have a stent placed by Dr. Pena on 1/22/2024.  She was unable to follow-up with [facility] due to insurance.     Repeat CT scan showed complete resolution of hydronephrosis, less parenchymal thinning than prior.  MAG3 renal scan on 3/22/2024 showed 25% function of the left kidney and 75% function of the right kidney.  Post-Lasix left T1/2 was 26 minutes on the left.     Given decent function of the left kidney I discussed her options again of PCNL versus nephrectomy.  I do think it is  worth saving his kidney and perform PCNL given 25% function.  The obstruction noted on renal scan could be related to the large stone burden.    Scheduled for PCNL on 5/22/24. Here today for stent exchange given stent placed ~ 3 months ago.    UCx grew E coli sensitive to Bactrim - treated pre-op with Bactrim.    Plan:   - OR for cystoscopy, left retrograde pyelogram and stent exchange  - Informed consent obtained - risks and benefits explained, and all questions answered    I have personally reviewed all relevant medical records, labs, and imaging.     Kip Ann MD  Staff Urologist  [facility]  Office: [phone]

## 2024-04-15 NOTE — ANESTHESIA PROCEDURE NOTES
Airway  Date/Time: 4/15/2024 4:00 PM  Urgency: elective      General Information and Staff    Patient location during procedure: OR  Anesthesiologist: Karan Asencio MD  Resident/CRNA: Magda Sanchez CRNA  Performed: CRNA   Performed by: Magda Sanchez CRNA  Authorized by: Karan Asencio MD      Indications and Patient Condition  Indications for airway management: anesthesia  Sedation level: deep  Preoxygenated: yes  Patient position: sniffing  Mask difficulty assessment: 1 - vent by mask    Final Airway Details  Final airway type: supraglottic airway      Successful airway: classic  Size 3       Number of attempts at approach: 1  Number of other approaches attempted: 0

## 2024-04-15 NOTE — OPERATIVE REPORT
Urology Operative Note    Attending Surgeon: Kip Ann MD    Assistant Surgeon: None    Patient Name: Kirstin Myers    Date of Surgery: 4/15/2024    Preoperative Diagnosis: Left staghorn calculus    Postoperative Diagnosis: Same    Procedure Performed:   Cystoscopy, LEFT retrograde pyelogram and ureteral stent exchange  Cystolitholapaxy >2.5 cm    Indication:  Patient is a 54 year old female who presented with a left staghorn calculus s/p left stent placement in January, here for stent exchange given it has been in for 3 months and PCNL not schedules until 5/22/24. The patient was counseled on options, risks, and benefits and elected to undergo the above procedure. We discussed risks including, but not limited to, bleeding, infection, damage to surrounding structures, need for repeat procedure(s) (including inability to place a stent and need for nephrostomy tube). The patient understood these risks and wished to proceed with surgery.    Findings:  Large ~3cm stone encrustation surrounding entire distal coil of stent within the bladder requiring stone crushed and evacuation of stone. Successful stent exchange after.    Procedure:  The patient was taken to the operating room and a timeout was performed confirming the correct patient and procedure. The patient was prepped and draped in lithotomy position after undergoing general anesthesia. Pre-operative prophylactic antibiotics were given in the form of Cefazolin.    The cystoscope was inserted per urethra and the bladder was inspected and drained. A large stone encrustation surrounding the entire distal coil of the stent was noted. I reinserted a stone  into the bladder and crushed the stone, then aspirated all of the pieces.    The left ureter was cannulated with a Sensor wire, and the wire was passed into the renal pelvis under fluoroscopy alongside the stent. The old stent was then removed and fortunately there was minimal encrustation of the  proximal coil and or ureteral portion of the stent.     A 5-Italian open ended catheter was passed over the wire and into the kidney, and then the wire was removed. A retrograde pyelogram was performed with contrast, showing mild hydronephrosis and contrast outlining and large lower pole/renal pelvis stone. The sensor wire was reinserted into the open ended catheter and into the upper pole of the kidney. The open ended catheter was then removed.     A 6-Italian x 28 cm JJ ureteral stent was inserted over the wire. The proximal coil was formed in the kidney under fluoroscopic guidance. The distal coil was formed within the bladder using the pusher and fluoroscopy. The stent string was removed prior to stent placement.    The bladder was drained and the cystoscope was removed. The patient was awoken from anesthesia and transferred to PACU in stable condition. The patient tolerated the procedure well. All instrument/supply counts were correct at the end of the case.    Specimens:   None    Estimated Blood Loss:  1 mL    Tubes/Drains:  6-Italian x 28 cm JJ left ureteral stent    Complications:   None immediate    Condition from OR:  Stable    Plan:   The patient will follow up on 5/22/24 for definitive stone treatment via PCNL.      Kip Ann MD  Staff Urologist  [facility]  Office: [phone]

## 2024-04-26 ENCOUNTER — TELEPHONE (OUTPATIENT)
Dept: SURGERY | Facility: CLINIC | Age: 54
End: 2024-04-26

## 2024-04-26 VITALS — HEIGHT: 68 IN | WEIGHT: 257 LBS | BODY MASS INDEX: 38.95 KG/M2

## 2024-04-26 DIAGNOSIS — N20.0 KIDNEY STONE: Primary | ICD-10-CM

## 2024-04-26 RX ORDER — ACETAMINOPHEN 500 MG
1000 TABLET ORAL ONCE
Status: CANCELLED | OUTPATIENT
Start: 2024-04-26 | End: 2024-04-26

## 2024-04-26 RX ORDER — SODIUM CHLORIDE, SODIUM LACTATE, POTASSIUM CHLORIDE, CALCIUM CHLORIDE 600; 310; 30; 20 MG/100ML; MG/100ML; MG/100ML; MG/100ML
INJECTION, SOLUTION INTRAVENOUS CONTINUOUS
Status: CANCELLED | OUTPATIENT
Start: 2024-04-26

## 2024-04-26 NOTE — TELEPHONE ENCOUNTER
Pre op testing orders put in EPIC or PT/INR within 30 days of surgery and urine culture within 14 days of surgery.  Mcm sent to patient.

## 2024-05-01 RX ORDER — SULFAMETHOXAZOLE AND TRIMETHOPRIM 800; 160 MG/1; MG/1
1 TABLET ORAL 2 TIMES DAILY
Qty: 14 TABLET | Refills: 0 | OUTPATIENT
Start: 2024-05-01 | End: 2024-05-08

## 2024-05-16 ENCOUNTER — TELEPHONE (OUTPATIENT)
Dept: SURGERY | Facility: CLINIC | Age: 54
End: 2024-05-16

## 2024-05-16 DIAGNOSIS — N13.2 HYDRONEPHROSIS WITH URINARY OBSTRUCTION DUE TO RENAL CALCULUS: ICD-10-CM

## 2024-05-16 DIAGNOSIS — N20.0 NEPHROLITHIASIS: ICD-10-CM

## 2024-05-16 DIAGNOSIS — N12 PYELONEPHRITIS: Primary | ICD-10-CM

## 2024-05-16 DIAGNOSIS — N21.0 BLADDER STONE: ICD-10-CM

## 2024-05-16 NOTE — TELEPHONE ENCOUNTER
This encounter is now closed.     Prothrombin Time and Urine culture orders sent to [facility]. Patient updated via [employer]

## 2024-05-16 NOTE — TELEPHONE ENCOUNTER
Patient is calling to request her orders for the blood and urine tests to be sent over to [email or web address] in [place]. Per patient, she requested the orders to be sent to [email or web address] but as of today they still do not have the order. Patient is scheduled for surgery on 5/22. Please advise.

## 2024-05-20 LAB
INR: 0.9
PT: 9.8 SEC (ref 9–11.5)

## 2024-05-20 NOTE — PAT NURSING NOTE
Patient called with concerns that her allergies are acting up, but more concerning is her chest congestion with mild wheezing and yellow phlegm. No fever.  Advised her to call Dr Ann's office tomorrow morning

## 2024-05-22 ENCOUNTER — TELEPHONE (OUTPATIENT)
Dept: SURGERY | Facility: CLINIC | Age: 54
End: 2024-05-22

## 2024-05-22 RX ORDER — SULFAMETHOXAZOLE AND TRIMETHOPRIM 800; 160 MG/1; MG/1
1 TABLET ORAL 2 TIMES DAILY
Qty: 8 TABLET | Refills: 0 | Status: SHIPPED | OUTPATIENT
Start: 2024-05-22 | End: 2024-05-26

## 2024-05-22 NOTE — OR NURSING
Called patient, she will not be in for surgery today because she is still having chest congestion and yellow phlegm. She states she did speak with someone in Dr. Ann's office yesterday.  Patient made aware to follow up with Dr. Ann's office to reschedule. Laurie in OR holding made aware, she will notify Dr. Ann and interventional radiology.

## 2024-05-22 NOTE — TELEPHONE ENCOUNTER
Scheduled for left PCNL today. Called and cancelled surgery as she has not been feeling well for the past few days, worse today. URI symptoms and congestion, cough. COVID test negative.     Spoke with her. Will re-schedule surgery. Bactrim 2 days before and 2 days after.

## 2024-06-14 ENCOUNTER — TELEPHONE (OUTPATIENT)
Dept: SURGERY | Facility: CLINIC | Age: 54
End: 2024-06-14

## 2024-06-14 NOTE — TELEPHONE ENCOUNTER
Pt called requesting to speak with the nurse.  Rx. For upcoming surgery were thrown away.  Please call pt

## 2024-06-17 RX ORDER — SULFAMETHOXAZOLE AND TRIMETHOPRIM 800; 160 MG/1; MG/1
TABLET ORAL
Qty: 8 TABLET | Refills: 0 | Status: SHIPPED | OUTPATIENT
Start: 2024-06-17

## 2024-06-17 RX ORDER — OXYBUTYNIN CHLORIDE 10 MG/1
10 TABLET, EXTENDED RELEASE ORAL DAILY
Qty: 30 TABLET | Refills: 3 | Status: SHIPPED | OUTPATIENT
Start: 2024-06-17

## 2024-06-17 NOTE — TELEPHONE ENCOUNTER
This encounter is now closed.     RN called patient. She reports that it's been chaotic at her home and in the process of moving. She lost the oxybutynin and the antibiotic she is suppose to take 2 days before and 2 days after surgery.     Orders placed.     Scheduled for nephrolithotomy on 7/10  See telephone encounter on 5/22/24 10:33 AM    Note     Scheduled for left PCNL today. Called and cancelled surgery as she has not been feeling well for the past few days, worse today. URI symptoms and congestion, cough. COVID test negative.      Spoke with her. Will re-schedule surgery. Bactrim 2 days before and 2 days after.

## 2024-06-27 DIAGNOSIS — R82.90 URINE FINDING: Primary | ICD-10-CM

## 2024-06-27 RX ORDER — SODIUM CHLORIDE 9 MG/ML
INJECTION, SOLUTION INTRAVENOUS CONTINUOUS
Status: CANCELLED | OUTPATIENT
Start: 2024-06-27

## 2024-06-27 RX ORDER — ACETAMINOPHEN 160 MG
2000 TABLET,DISINTEGRATING ORAL DAILY
COMMUNITY

## 2024-06-27 RX ORDER — CEFAZOLIN SODIUM IN 0.9 % NACL 3 G/100 ML
3 INTRAVENOUS SOLUTION, PIGGYBACK (ML) INTRAVENOUS ONCE
Status: CANCELLED | OUTPATIENT
Start: 2024-06-27 | End: 2024-06-27

## 2024-06-27 RX ORDER — SCOLOPAMINE TRANSDERMAL SYSTEM 1 MG/1
1 PATCH, EXTENDED RELEASE TRANSDERMAL ONCE
Status: CANCELLED | OUTPATIENT
Start: 2024-06-27 | End: 2024-06-27

## 2024-06-27 NOTE — PAT NURSING NOTE
Patient requests lab testing orders be sent to [facility].I called ,surgeon office and spoke to VISH Hay and requested testing orders be sent to [facility]. I also faxed written request to office

## 2024-07-02 RX ORDER — OXYBUTYNIN CHLORIDE 10 MG/1
10 TABLET, EXTENDED RELEASE ORAL DAILY
Qty: 30 TABLET | Refills: 3 | Status: SHIPPED | OUTPATIENT
Start: 2024-07-02

## 2024-07-03 ENCOUNTER — TELEPHONE (OUTPATIENT)
Dept: SURGERY | Facility: CLINIC | Age: 54
End: 2024-07-03

## 2024-07-03 NOTE — TELEPHONE ENCOUNTER
Re from Pre admission at [facility] is calling to request the lab work patient completed on 7/2 for her 7/10 surgery. Re requests we fax the lab results to [phone].

## 2024-07-07 LAB
ABSOLUTE BASOPHILS: 130 CELLS/UL (ref 0–200)
ABSOLUTE EOSINOPHILS: 149 CELLS/UL (ref 15–500)
ABSOLUTE LYMPHOCYTES: 2697 CELLS/UL (ref 850–3900)
ABSOLUTE MONOCYTES: 781 CELLS/UL (ref 200–950)
ABSOLUTE NEUTROPHILS: 5543 CELLS/UL (ref 1500–7800)
BASOPHILS: 1.4 %
EOSINOPHILS: 1.6 %
HEMATOCRIT: 46 % (ref 35–45)
HEMOGLOBIN: 15.3 G/DL (ref 11.7–15.5)
INR: 0.9
LYMPHOCYTES: 29 %
MCH: 29.4 PG (ref 27–33)
MCHC: 33.3 G/DL (ref 32–36)
MCV: 88.3 FL (ref 80–100)
MONOCYTES: 8.4 %
MPV: 10.6 FL (ref 7.5–12.5)
NEUTROPHILS: 59.6 %
PLATELET COUNT: 392 THOUSAND/UL (ref 140–400)
PT: 10 SEC (ref 9–11.5)
RDW: 14.9 % (ref 11–15)
RED BLOOD CELL COUNT: 5.21 MILLION/UL (ref 3.8–5.1)
WHITE BLOOD CELL COUNT: 9.3 THOUSAND/UL (ref 3.8–10.8)

## 2024-07-08 ENCOUNTER — TELEPHONE (OUTPATIENT)
Dept: SURGERY | Facility: CLINIC | Age: 54
End: 2024-07-08

## 2024-07-08 NOTE — TELEPHONE ENCOUNTER
[facility] calling to request recent urine lab results completed at [facility] to be faxed to [phone],as soon as possible prior to surgery, thanks.

## 2024-07-09 NOTE — H&P
UROLOGY PRE-OPERATIVE HISTORY & PHYSICAL      Kirstin Myers Patient Status:  Outpatient in a Bed    3/24/1970 MRN [id number]   [facility] SURGERY Attending Kip Ann MD   Hosp Day # 0 PCP None Pcp     Primary Care Provider: Pcp, None     Chief Complaint:   Nephrolithiasis     HPI:   Kirstin Myers is a 54 year old female with history of diabetes who was seen by SAIDA Wynn on 2024 for nephrolithiasis.  She presented with left flank pain and signs of infection.  CT 2024 shows moderate to severe hydronephrosis related to a large obstructing partial staghorn UPJ stone measuring 4.3 cm, as well as a 7 mm lower pole stone.  No right renal stones present.  She did have a stent placed by Dr. Pena on 2024.  She was unable to follow-up with [facility] due to insurance.     Repeat CT scan showed complete resolution of hydronephrosis, less parenchymal thinning than prior.  MAG3 renal scan on 3/22/2024 showed 25% function of the left kidney and 75% function of the right kidney.  Post-Lasix left T1/2 was 26 minutes on the left.     Given decent function of the left kidney I discussed her options again of PCNL versus nephrectomy.  I do think it is worth saving his kidney and perform PCNL given 25% function.  The obstruction noted on renal scan could be related to the large stone burden.     Scheduled for PCNL on 24.  I brought her to the OR on 4/15/2024 for stent exchange.  Her stent was significantly encrusted and needed to use a stone  to treat a large stone within the bladder around the distal coil of the stent.  She was scheduled for PCNL a few weeks after that, but needed to cancel due to upper respiratory infection.    UCx negative on 24.    History:     Past Medical History:    Calculus of kidney    Prediabetes    Visual impairment    GLASSES       Past Surgical History:   Procedure Laterality Date          Cystoscopy,insert ureteral stent       Hernia surgery            Other surgical history  2024    kidney stone    Other surgical history  04/15/2024    stent exchange    Repair ing hernia,5+y/o,reducibl      Ventral hernia repair N/A 2016    Procedure: HERNIA VENTRAL REPAIR;  Surgeon: Vijay Randall MD;  Location:  MAIN OR       Family History   Problem Relation Age of Onset    Cancer Mother     Heart Disease Maternal Grandfather        Social History     Socioeconomic History    Marital status:    Tobacco Use    Smoking status: Every Day     Current packs/day: 1.00     Average packs/day: 1 pack/day for 30.0 years (30.0 ttl pk-yrs)     Types: Cigarettes     Passive exposure: Current   Vaping Use    Vaping status: Former   Substance and Sexual Activity    Alcohol use: No     Alcohol/week: 0.0 standard drinks of alcohol    Drug use: No     Social Determinants of Health     Food Insecurity: No Food Insecurity (2024)    Food Insecurity     Food Insecurity: Never true   Transportation Needs: No Transportation Needs (2024)    Transportation Needs     Lack of Transportation: No   Housing Stability: Low Risk  (2024)    Housing Stability     Housing Instability: No       Medications:  Current Outpatient Medications   Medication Sig Dispense Refill    cholecalciferol 50 MCG (2000 UT) Oral Cap Take 1 capsule (2,000 Units total) by mouth daily.      NON FORMULARY Beet root juice      cetirizine 10 MG Oral Tab Take 1 tablet (10 mg total) by mouth daily.      Fluticasone Propionate 50 MCG/ACT Nasal Suspension 1 spray by Each Nare route daily.      B Complex Vitamins (B COMPLEX OR) Take by mouth daily.      Bromelains (BROMELAIN OR) Take by mouth daily.      CHROMIUM OR Take by mouth daily.      Coenzyme Q10 (COQ10 OR) Take by mouth.      COLLAGEN OR Take by mouth daily.      Multiple Vitamins-Minerals (MULTIVITAMIN ADULT OR) Take by mouth daily.      LYSINE OR Take by mouth daily.      Green Tea, Camillia sinensis, (GREEN TEA OR)  Take by mouth daily.      Omega-3 Fatty Acids (OMEGA-3 FISH OIL OR) Take by mouth daily.      Probiotic Product (PROBIOTIC DAILY OR) Take by mouth daily.      oxybutynin ER 10 MG Oral Tablet 24 Hr Take 1 tablet (10 mg total) by mouth daily. 30 tablet 3    sulfamethoxazole-trimethoprim -160 MG Oral Tab per tablet Take 1 tablet by mouth 2 (two) times daily. She will take it 2 days before and 2 days after surgery. 8 tablet 0    acetaminophen 325 MG Oral Tab Take 1 tablet (325 mg total) by mouth every 6 (six) hours as needed for Pain.         Allergies:  No Known Allergies    Review of Systems:   A comprehensive 10-point review of systems was completed.  Pertinent positives and negatives are noted in the the HPI.    Physical Exam:   Vital Signs:  Height 5' 9\" (1.753 m), weight 280 lb (127 kg), last menstrual period 03/04/2024.     CONSTITUTIONAL: Well developed, well nourished, in no acute distress  NEUROLOGIC: Alert and oriented  HEAD: Normocephalic, atraumatic  EYES: Sclera non-icteric  ENT: Hearing intact, moist mucous membranes  NECK: No obvious goiter or masses  RESPIRATORY: Normal respiratory effort  SKIN: No evident rashes  ABDOMEN: Soft, non-tender, non-distended    Laboratory Data:  Lab Results   Component Value Date    WBC 9.3 07/05/2024    HGB 15.3 07/05/2024     07/05/2024     Lab Results   Component Value Date     04/11/2024    K 5.0 04/11/2024     04/11/2024    CO2 29 04/11/2024    BUN 25 04/11/2024     (H) 04/11/2024    AST 15 04/11/2024    ALT 14 04/11/2024    TP 7.3 04/11/2024    ALB 4.1 04/11/2024    CA 10.1 04/11/2024       Urinalysis Results (last three years):  Recent Labs     01/22/24  0050 02/20/24  1209 03/05/24  1321   COLORUR Yellow  --   --    CLARITY Turbid*  --   --    SPECGRAVITY 1.020 1.025 1.025   PHURINE 6.5 6.0 7.0   PROUR 50*  --   --    GLUUR Normal  --   --    KETUR Negative  --   --    BILUR Negative  --   --    BLOODURINE Negative  --   --    NITRITE  2+* Negative Negative   UROBILINOGEN 3*  --   --    LEUUR 500*  --   --    WBCUR >50*  --   --    RBCUR 3-5*  --   --    BACUR 3+*  --   --        Urine Culture Results (last three years):  Lab Results   Component Value Date    URINECUL  03/05/2024     >100,000 cfu/ml Multiple species present- probable contamination.    URINECUL No Growth 2 Days 02/20/2024    URINECUL >100,000 CFU/ML Escherichia coli (A) 01/22/2024    URINECUL >100,000 CFU/ML Proteus mirabilis (A) 01/22/2024    URINECUL >100,000 CFU/ML Escherichia coli (A) 01/22/2024       PSA:  No results found for: \"PSA\", \"PERCENTPSA\", \"PSAS\", \"PSAULTRA\"     Imaging (last three days):  No results found.     Assessment:   Kirstin Myers is a 54 year old female with history of diabetes who was seen by SAIDA Wynn on 2/20/2024 for nephrolithiasis.  She presented with left flank pain and signs of infection.  CT 1/22/2024 shows moderate to severe hydronephrosis related to a large obstructing partial staghorn UPJ stone measuring 4.3 cm, as well as a 7 mm lower pole stone.  No right renal stones present.  She did have a stent placed by Dr. Pena on 1/22/2024.  She was unable to follow-up with [facility] due to insurance.     Repeat CT scan showed complete resolution of hydronephrosis, less parenchymal thinning than prior.  MAG3 renal scan on 3/22/2024 showed 25% function of the left kidney and 75% function of the right kidney.  Post-Lasix left T1/2 was 26 minutes on the left.     Given decent function of the left kidney I discussed her options again of PCNL versus nephrectomy.  I do think it is worth saving his kidney and perform PCNL given 25% function.  The obstruction noted on renal scan could be related to the large stone burden.     Scheduled for PCNL on 5/22/24.  I brought her to the OR on 4/15/2024 for stent exchange.  Her stent was significantly encrusted and needed to use a stone  to treat a large stone within the bladder around the distal coil  of the stent.  She was scheduled for PCNL a few weeks after that, but needed to cancel due to upper respiratory infection.    Plan:   - OR for cystoscopy, possible cystolitholapaxy, left ureteral stent exchange, left PCNL  - Informed consent obtained - risks and benefits explained, and all questions answered    I have personally reviewed all relevant medical records, labs, and imaging.     Kip Ann MD  Staff Urologist  [facility]  Office: [phone]

## 2024-07-10 ENCOUNTER — ANESTHESIA EVENT (OUTPATIENT)
Dept: SURGERY | Facility: HOSPITAL | Age: 54
End: 2024-07-10
Payer: MEDICAID

## 2024-07-10 ENCOUNTER — ANESTHESIA (OUTPATIENT)
Dept: SURGERY | Facility: HOSPITAL | Age: 54
End: 2024-07-10
Payer: MEDICAID

## 2024-07-10 ENCOUNTER — HOSPITAL ENCOUNTER (OUTPATIENT)
Facility: HOSPITAL | Age: 54
Discharge: HOME | End: 2024-07-11
Attending: SURGERY | Admitting: SURGERY
Payer: MEDICAID

## 2024-07-10 ENCOUNTER — APPOINTMENT (OUTPATIENT)
Dept: GENERAL RADIOLOGY | Facility: HOSPITAL | Age: 54
End: 2024-07-10
Attending: SURGERY
Payer: MEDICAID

## 2024-07-10 ENCOUNTER — HOSPITAL ENCOUNTER (OUTPATIENT)
Dept: INTERVENTIONAL RADIOLOGY/VASCULAR | Facility: HOSPITAL | Age: 54
Discharge: HOME | End: 2024-07-10
Attending: SURGERY | Admitting: SURGERY
Payer: MEDICAID

## 2024-07-10 DIAGNOSIS — N20.0 RENAL STONE: ICD-10-CM

## 2024-07-10 DIAGNOSIS — N20.0 NEPHROLITHIASIS: ICD-10-CM

## 2024-07-10 LAB
ANION GAP SERPL CALC-SCNC: 4 MMOL/L (ref 0–18)
BASOPHILS # BLD AUTO: 0.09 X10(3) UL (ref 0–0.2)
BASOPHILS NFR BLD AUTO: 0.7 %
BUN BLD-MCNC: 16 MG/DL (ref 9–23)
CALCIUM BLD-MCNC: 9.1 MG/DL (ref 8.5–10.1)
CHLORIDE SERPL-SCNC: 110 MMOL/L (ref 98–112)
CO2 SERPL-SCNC: 25 MMOL/L (ref 21–32)
CREAT BLD-MCNC: 1.35 MG/DL
EGFRCR SERPLBLD CKD-EPI 2021: 46 ML/MIN/1.73M2 (ref 60–?)
EOSINOPHIL # BLD AUTO: 0.02 X10(3) UL (ref 0–0.7)
EOSINOPHIL NFR BLD AUTO: 0.2 %
ERYTHROCYTE [DISTWIDTH] IN BLOOD BY AUTOMATED COUNT: 15.8 %
GLUCOSE BLD-MCNC: 157 MG/DL (ref 70–99)
HCT VFR BLD AUTO: 48.8 %
HGB BLD-MCNC: 15.7 G/DL
IMM GRANULOCYTES # BLD AUTO: 0.05 X10(3) UL (ref 0–1)
IMM GRANULOCYTES NFR BLD: 0.4 %
LYMPHOCYTES # BLD AUTO: 1.18 X10(3) UL (ref 1–4)
LYMPHOCYTES NFR BLD AUTO: 9.4 %
MCH RBC QN AUTO: 29.3 PG (ref 26–34)
MCHC RBC AUTO-ENTMCNC: 32.2 G/DL (ref 31–37)
MCV RBC AUTO: 91.2 FL
MONOCYTES # BLD AUTO: 0.26 X10(3) UL (ref 0.1–1)
MONOCYTES NFR BLD AUTO: 2.1 %
NEUTROPHILS # BLD AUTO: 10.95 X10 (3) UL (ref 1.5–7.7)
NEUTROPHILS # BLD AUTO: 10.95 X10(3) UL (ref 1.5–7.7)
NEUTROPHILS NFR BLD AUTO: 87.2 %
OSMOLALITY SERPL CALC.SUM OF ELEC: 292 MOSM/KG (ref 275–295)
PLATELET # BLD AUTO: 298 10(3)UL (ref 150–450)
POTASSIUM SERPL-SCNC: 4.5 MMOL/L (ref 3.5–5.1)
RBC # BLD AUTO: 5.35 X10(6)UL
SODIUM SERPL-SCNC: 139 MMOL/L (ref 136–145)
WBC # BLD AUTO: 12.6 X10(3) UL (ref 4–11)

## 2024-07-10 PROCEDURE — 0TJ53ZZ INSPECTION OF KIDNEY, PERCUTANEOUS APPROACH: ICD-10-PCS | Performed by: SURGERY

## 2024-07-10 PROCEDURE — 50433 PLMT NEPHROURETERAL CATHETER: CPT | Performed by: RADIOLOGY

## 2024-07-10 PROCEDURE — 0TC13ZZ EXTIRPATION OF MATTER FROM LEFT KIDNEY, PERCUTANEOUS APPROACH: ICD-10-PCS | Performed by: SURGERY

## 2024-07-10 PROCEDURE — 0T9430Z DRAINAGE OF LEFT KIDNEY PELVIS WITH DRAINAGE DEVICE, PERCUTANEOUS APPROACH: ICD-10-PCS | Performed by: SURGERY

## 2024-07-10 PROCEDURE — BT121ZZ FLUOROSCOPY OF LEFT KIDNEY USING LOW OSMOLAR CONTRAST: ICD-10-PCS | Performed by: SURGERY

## 2024-07-10 PROCEDURE — 0T773DZ DILATION OF LEFT URETER WITH INTRALUMINAL DEVICE, PERCUTANEOUS APPROACH: ICD-10-PCS | Performed by: RADIOLOGY

## 2024-07-10 PROCEDURE — 82365 CALCULUS SPECTROSCOPY: CPT | Performed by: SURGERY

## 2024-07-10 PROCEDURE — 0T773DZ DILATION OF LEFT URETER WITH INTRALUMINAL DEVICE, PERCUTANEOUS APPROACH: ICD-10-PCS | Performed by: SURGERY

## 2024-07-10 PROCEDURE — 0T743ZZ DILATION OF LEFT KIDNEY PELVIS, PERCUTANEOUS APPROACH: ICD-10-PCS | Performed by: SURGERY

## 2024-07-10 PROCEDURE — 85025 COMPLETE CBC W/AUTO DIFF WBC: CPT | Performed by: SURGERY

## 2024-07-10 PROCEDURE — 94660 CPAP INITIATION&MGMT: CPT

## 2024-07-10 PROCEDURE — 0TCB8ZZ EXTIRPATION OF MATTER FROM BLADDER, VIA NATURAL OR ARTIFICIAL OPENING ENDOSCOPIC: ICD-10-PCS | Performed by: SURGERY

## 2024-07-10 PROCEDURE — 88300 SURGICAL PATH GROSS: CPT | Performed by: SURGERY

## 2024-07-10 PROCEDURE — 99152 MOD SED SAME PHYS/QHP 5/>YRS: CPT | Performed by: RADIOLOGY

## 2024-07-10 PROCEDURE — 0T5B8ZZ DESTRUCTION OF BLADDER, VIA NATURAL OR ARTIFICIAL OPENING ENDOSCOPIC: ICD-10-PCS | Performed by: SURGERY

## 2024-07-10 PROCEDURE — 80048 BASIC METABOLIC PNL TOTAL CA: CPT | Performed by: SURGERY

## 2024-07-10 PROCEDURE — 36415 COLL VENOUS BLD VENIPUNCTURE: CPT | Performed by: SURGERY

## 2024-07-10 DEVICE — URETERAL STENT
Type: IMPLANTABLE DEVICE | Site: URETER | Status: FUNCTIONAL
Brand: ASCERTA™

## 2024-07-10 RX ORDER — DEXAMETHASONE SODIUM PHOSPHATE 4 MG/ML
VIAL (ML) INJECTION AS NEEDED
Status: DISCONTINUED | OUTPATIENT
Start: 2024-07-10 | End: 2024-07-10 | Stop reason: SURG

## 2024-07-10 RX ORDER — SODIUM CHLORIDE, SODIUM LACTATE, POTASSIUM CHLORIDE, CALCIUM CHLORIDE 600; 310; 30; 20 MG/100ML; MG/100ML; MG/100ML; MG/100ML
INJECTION, SOLUTION INTRAVENOUS CONTINUOUS
Status: DISCONTINUED | OUTPATIENT
Start: 2024-07-10 | End: 2024-07-10

## 2024-07-10 RX ORDER — DEXTROSE MONOHYDRATE, SODIUM CHLORIDE, AND POTASSIUM CHLORIDE 50; 1.49; 4.5 G/1000ML; G/1000ML; G/1000ML
INJECTION, SOLUTION INTRAVENOUS CONTINUOUS
Status: DISCONTINUED | OUTPATIENT
Start: 2024-07-10 | End: 2024-07-11

## 2024-07-10 RX ORDER — ONDANSETRON 4 MG/1
4 TABLET, FILM COATED ORAL EVERY 6 HOURS PRN
Status: DISCONTINUED | OUTPATIENT
Start: 2024-07-10 | End: 2024-07-11

## 2024-07-10 RX ORDER — ONDANSETRON 2 MG/ML
INJECTION INTRAMUSCULAR; INTRAVENOUS AS NEEDED
Status: DISCONTINUED | OUTPATIENT
Start: 2024-07-10 | End: 2024-07-10 | Stop reason: SURG

## 2024-07-10 RX ORDER — TAMSULOSIN HYDROCHLORIDE 0.4 MG/1
0.4 CAPSULE ORAL
Status: DISCONTINUED | OUTPATIENT
Start: 2024-07-11 | End: 2024-07-11

## 2024-07-10 RX ORDER — SODIUM CHLORIDE, SODIUM LACTATE, POTASSIUM CHLORIDE, CALCIUM CHLORIDE 600; 310; 30; 20 MG/100ML; MG/100ML; MG/100ML; MG/100ML
INJECTION, SOLUTION INTRAVENOUS CONTINUOUS
Status: DISCONTINUED | OUTPATIENT
Start: 2024-07-10 | End: 2024-07-10 | Stop reason: HOSPADM

## 2024-07-10 RX ORDER — HYDROCODONE BITARTRATE AND ACETAMINOPHEN 5; 325 MG/1; MG/1
2 TABLET ORAL ONCE AS NEEDED
Status: DISCONTINUED | OUTPATIENT
Start: 2024-07-10 | End: 2024-07-10 | Stop reason: HOSPADM

## 2024-07-10 RX ORDER — FAMOTIDINE 20 MG/1
20 TABLET, FILM COATED ORAL 2 TIMES DAILY PRN
Status: DISCONTINUED | OUTPATIENT
Start: 2024-07-10 | End: 2024-07-11

## 2024-07-10 RX ORDER — MIDAZOLAM HYDROCHLORIDE 1 MG/ML
1 INJECTION INTRAMUSCULAR; INTRAVENOUS EVERY 5 MIN PRN
Status: DISCONTINUED | OUTPATIENT
Start: 2024-07-10 | End: 2024-07-10 | Stop reason: HOSPADM

## 2024-07-10 RX ORDER — BUPIVACAINE HYDROCHLORIDE 2.5 MG/ML
INJECTION, SOLUTION EPIDURAL; INFILTRATION; INTRACAUDAL AS NEEDED
Status: DISCONTINUED | OUTPATIENT
Start: 2024-07-10 | End: 2024-07-10 | Stop reason: HOSPADM

## 2024-07-10 RX ORDER — LIDOCAINE HYDROCHLORIDE 10 MG/ML
INJECTION, SOLUTION EPIDURAL; INFILTRATION; INTRACAUDAL; PERINEURAL AS NEEDED
Status: DISCONTINUED | OUTPATIENT
Start: 2024-07-10 | End: 2024-07-10 | Stop reason: SURG

## 2024-07-10 RX ORDER — ROCURONIUM BROMIDE 10 MG/ML
INJECTION, SOLUTION INTRAVENOUS AS NEEDED
Status: DISCONTINUED | OUTPATIENT
Start: 2024-07-10 | End: 2024-07-10 | Stop reason: SURG

## 2024-07-10 RX ORDER — DIPHENHYDRAMINE HYDROCHLORIDE 50 MG/ML
12.5 INJECTION INTRAMUSCULAR; INTRAVENOUS AS NEEDED
Status: DISCONTINUED | OUTPATIENT
Start: 2024-07-10 | End: 2024-07-10 | Stop reason: HOSPADM

## 2024-07-10 RX ORDER — LEVOFLOXACIN 5 MG/ML
INJECTION, SOLUTION INTRAVENOUS
Status: COMPLETED
Start: 2024-07-10 | End: 2024-07-10

## 2024-07-10 RX ORDER — HYDROCODONE BITARTRATE AND ACETAMINOPHEN 5; 325 MG/1; MG/1
1 TABLET ORAL ONCE AS NEEDED
Status: DISCONTINUED | OUTPATIENT
Start: 2024-07-10 | End: 2024-07-10 | Stop reason: HOSPADM

## 2024-07-10 RX ORDER — HYDROMORPHONE HYDROCHLORIDE 1 MG/ML
0.6 INJECTION, SOLUTION INTRAMUSCULAR; INTRAVENOUS; SUBCUTANEOUS EVERY 5 MIN PRN
Status: DISCONTINUED | OUTPATIENT
Start: 2024-07-10 | End: 2024-07-10 | Stop reason: HOSPADM

## 2024-07-10 RX ORDER — HYDROMORPHONE HYDROCHLORIDE 1 MG/ML
0.8 INJECTION, SOLUTION INTRAMUSCULAR; INTRAVENOUS; SUBCUTANEOUS EVERY 2 HOUR PRN
Status: DISCONTINUED | OUTPATIENT
Start: 2024-07-10 | End: 2024-07-11

## 2024-07-10 RX ORDER — HYDROMORPHONE HYDROCHLORIDE 1 MG/ML
INJECTION, SOLUTION INTRAMUSCULAR; INTRAVENOUS; SUBCUTANEOUS
Status: COMPLETED
Start: 2024-07-10 | End: 2024-07-10

## 2024-07-10 RX ORDER — MIDAZOLAM HYDROCHLORIDE 1 MG/ML
INJECTION INTRAMUSCULAR; INTRAVENOUS
Status: DISCONTINUED
Start: 2024-07-10 | End: 2024-07-10 | Stop reason: WASHOUT

## 2024-07-10 RX ORDER — LIDOCAINE HYDROCHLORIDE 10 MG/ML
INJECTION, SOLUTION INFILTRATION; PERINEURAL
Status: COMPLETED
Start: 2024-07-10 | End: 2024-07-10

## 2024-07-10 RX ORDER — MIDAZOLAM HYDROCHLORIDE 1 MG/ML
INJECTION INTRAMUSCULAR; INTRAVENOUS
Status: COMPLETED
Start: 2024-07-10 | End: 2024-07-10

## 2024-07-10 RX ORDER — POLYETHYLENE GLYCOL 3350 17 G/17G
17 POWDER, FOR SOLUTION ORAL DAILY PRN
Status: DISCONTINUED | OUTPATIENT
Start: 2024-07-10 | End: 2024-07-11

## 2024-07-10 RX ORDER — ONDANSETRON 2 MG/ML
4 INJECTION INTRAMUSCULAR; INTRAVENOUS EVERY 6 HOURS PRN
Status: DISCONTINUED | OUTPATIENT
Start: 2024-07-10 | End: 2024-07-11

## 2024-07-10 RX ORDER — BISACODYL 10 MG
10 SUPPOSITORY, RECTAL RECTAL
Status: DISCONTINUED | OUTPATIENT
Start: 2024-07-10 | End: 2024-07-11

## 2024-07-10 RX ORDER — OXYBUTYNIN CHLORIDE 5 MG/1
5 TABLET ORAL 3 TIMES DAILY PRN
Status: DISCONTINUED | OUTPATIENT
Start: 2024-07-10 | End: 2024-07-11

## 2024-07-10 RX ORDER — LABETALOL HYDROCHLORIDE 5 MG/ML
5 INJECTION, SOLUTION INTRAVENOUS EVERY 5 MIN PRN
Status: DISCONTINUED | OUTPATIENT
Start: 2024-07-10 | End: 2024-07-10 | Stop reason: HOSPADM

## 2024-07-10 RX ORDER — FLUTICASONE PROPIONATE 50 MCG
1 SPRAY, SUSPENSION (ML) NASAL DAILY
Status: DISCONTINUED | OUTPATIENT
Start: 2024-07-10 | End: 2024-07-11

## 2024-07-10 RX ORDER — HYDROMORPHONE HYDROCHLORIDE 1 MG/ML
0.4 INJECTION, SOLUTION INTRAMUSCULAR; INTRAVENOUS; SUBCUTANEOUS EVERY 5 MIN PRN
Status: DISCONTINUED | OUTPATIENT
Start: 2024-07-10 | End: 2024-07-10 | Stop reason: HOSPADM

## 2024-07-10 RX ORDER — TRAMADOL HYDROCHLORIDE 50 MG/1
50 TABLET ORAL EVERY 8 HOURS PRN
Status: DISCONTINUED | OUTPATIENT
Start: 2024-07-10 | End: 2024-07-11

## 2024-07-10 RX ORDER — PROCHLORPERAZINE EDISYLATE 5 MG/ML
5 INJECTION INTRAMUSCULAR; INTRAVENOUS EVERY 8 HOURS PRN
Status: DISCONTINUED | OUTPATIENT
Start: 2024-07-10 | End: 2024-07-10 | Stop reason: HOSPADM

## 2024-07-10 RX ORDER — MIDAZOLAM HYDROCHLORIDE 1 MG/ML
INJECTION INTRAMUSCULAR; INTRAVENOUS AS NEEDED
Status: DISCONTINUED | OUTPATIENT
Start: 2024-07-10 | End: 2024-07-10 | Stop reason: SURG

## 2024-07-10 RX ORDER — NALOXONE HYDROCHLORIDE 0.4 MG/ML
0.08 INJECTION, SOLUTION INTRAMUSCULAR; INTRAVENOUS; SUBCUTANEOUS AS NEEDED
Status: DISCONTINUED | OUTPATIENT
Start: 2024-07-10 | End: 2024-07-10 | Stop reason: HOSPADM

## 2024-07-10 RX ORDER — NICOTINE 21 MG/24HR
1 PATCH, TRANSDERMAL 24 HOURS TRANSDERMAL DAILY
Status: DISCONTINUED | OUTPATIENT
Start: 2024-07-10 | End: 2024-07-11

## 2024-07-10 RX ORDER — SENNOSIDES 8.6 MG
17.2 TABLET ORAL NIGHTLY PRN
Status: DISCONTINUED | OUTPATIENT
Start: 2024-07-10 | End: 2024-07-11

## 2024-07-10 RX ORDER — PHENYLEPHRINE HCL 10 MG/ML
VIAL (ML) INJECTION AS NEEDED
Status: DISCONTINUED | OUTPATIENT
Start: 2024-07-10 | End: 2024-07-10 | Stop reason: SURG

## 2024-07-10 RX ORDER — ACETAMINOPHEN 500 MG
1000 TABLET ORAL ONCE AS NEEDED
Status: DISCONTINUED | OUTPATIENT
Start: 2024-07-10 | End: 2024-07-10 | Stop reason: HOSPADM

## 2024-07-10 RX ORDER — HYDROMORPHONE HYDROCHLORIDE 1 MG/ML
0.4 INJECTION, SOLUTION INTRAMUSCULAR; INTRAVENOUS; SUBCUTANEOUS EVERY 2 HOUR PRN
Status: DISCONTINUED | OUTPATIENT
Start: 2024-07-10 | End: 2024-07-11

## 2024-07-10 RX ORDER — ONDANSETRON 2 MG/ML
4 INJECTION INTRAMUSCULAR; INTRAVENOUS EVERY 6 HOURS PRN
Status: DISCONTINUED | OUTPATIENT
Start: 2024-07-10 | End: 2024-07-10 | Stop reason: HOSPADM

## 2024-07-10 RX ORDER — ACETAMINOPHEN 500 MG
1000 TABLET ORAL EVERY 8 HOURS SCHEDULED
Status: DISCONTINUED | OUTPATIENT
Start: 2024-07-10 | End: 2024-07-11

## 2024-07-10 RX ORDER — ACETAMINOPHEN 500 MG
1000 TABLET ORAL ONCE
Status: DISCONTINUED | OUTPATIENT
Start: 2024-07-10 | End: 2024-07-10 | Stop reason: HOSPADM

## 2024-07-10 RX ORDER — HYDROMORPHONE HYDROCHLORIDE 1 MG/ML
0.2 INJECTION, SOLUTION INTRAMUSCULAR; INTRAVENOUS; SUBCUTANEOUS EVERY 5 MIN PRN
Status: DISCONTINUED | OUTPATIENT
Start: 2024-07-10 | End: 2024-07-10 | Stop reason: HOSPADM

## 2024-07-10 RX ORDER — OXYCODONE HYDROCHLORIDE 10 MG/1
10 TABLET ORAL EVERY 4 HOURS PRN
Status: DISCONTINUED | OUTPATIENT
Start: 2024-07-10 | End: 2024-07-11

## 2024-07-10 RX ADMIN — ONDANSETRON 4 MG: 2 INJECTION INTRAMUSCULAR; INTRAVENOUS at 13:44:00

## 2024-07-10 RX ADMIN — ROCURONIUM BROMIDE 20 MG: 10 INJECTION, SOLUTION INTRAVENOUS at 14:40:00

## 2024-07-10 RX ADMIN — ROCURONIUM BROMIDE 10 MG: 10 INJECTION, SOLUTION INTRAVENOUS at 16:00:00

## 2024-07-10 RX ADMIN — ROCURONIUM BROMIDE 10 MG: 10 INJECTION, SOLUTION INTRAVENOUS at 13:23:00

## 2024-07-10 RX ADMIN — LIDOCAINE HYDROCHLORIDE 5 ML: 10 INJECTION, SOLUTION EPIDURAL; INFILTRATION; INTRACAUDAL; PERINEURAL at 13:23:00

## 2024-07-10 RX ADMIN — MIDAZOLAM HYDROCHLORIDE 2 MG: 1 INJECTION INTRAMUSCULAR; INTRAVENOUS at 13:09:00

## 2024-07-10 RX ADMIN — ROCURONIUM BROMIDE 10 MG: 10 INJECTION, SOLUTION INTRAVENOUS at 15:10:00

## 2024-07-10 RX ADMIN — ROCURONIUM BROMIDE 10 MG: 10 INJECTION, SOLUTION INTRAVENOUS at 16:41:00

## 2024-07-10 RX ADMIN — DEXAMETHASONE SODIUM PHOSPHATE 4 MG: 4 MG/ML VIAL (ML) INJECTION at 13:26:00

## 2024-07-10 RX ADMIN — SODIUM CHLORIDE, SODIUM LACTATE, POTASSIUM CHLORIDE, CALCIUM CHLORIDE: 600; 310; 30; 20 INJECTION, SOLUTION INTRAVENOUS at 14:50:00

## 2024-07-10 RX ADMIN — SODIUM CHLORIDE, SODIUM LACTATE, POTASSIUM CHLORIDE, CALCIUM CHLORIDE: 600; 310; 30; 20 INJECTION, SOLUTION INTRAVENOUS at 17:16:00

## 2024-07-10 RX ADMIN — PHENYLEPHRINE HCL 100 MCG: 10 MG/ML VIAL (ML) INJECTION at 13:44:00

## 2024-07-10 RX ADMIN — ROCURONIUM BROMIDE 40 MG: 10 INJECTION, SOLUTION INTRAVENOUS at 13:30:00

## 2024-07-10 RX ADMIN — PHENYLEPHRINE HCL 100 MCG: 10 MG/ML VIAL (ML) INJECTION at 13:40:00

## 2024-07-10 RX ADMIN — SODIUM CHLORIDE, SODIUM LACTATE, POTASSIUM CHLORIDE, CALCIUM CHLORIDE: 600; 310; 30; 20 INJECTION, SOLUTION INTRAVENOUS at 13:09:00

## 2024-07-10 NOTE — ANESTHESIA PREPROCEDURE EVALUATION
PRE-OP EVALUATION    Patient Name: Kirstin Myers    Admit Diagnosis: Nephrolithiasis [N20.0]  Nephrolithiasis    Pre-op Diagnosis: Nephrolithiasis [N20.0]    LEFT PERCUTANEOUS NEPHROLITHOTOMY, POSSIBLE LEFT URETEROSCOPY, URETERAL LEFT STENT EXCHANGE    Anesthesia Procedure: LEFT PERCUTANEOUS NEPHROLITHOTOMY, POSSIBLE LEFT URETEROSCOPY, URETERAL LEFT STENT EXCHANGE (Left)    Surgeons and Role:     * Kip Ann MD - Primary    Pre-op vitals reviewed.  Temp: 98.9 °F (37.2 °C)  Pulse: 82  Resp: 16  BP: 111/60  SpO2: 95 %  Body mass index is 43.27 kg/m².    Current medications reviewed.  Hospital Medications:  • [Transfer Hold] acetaminophen (Tylenol Extra Strength) tab 1,000 mg  1,000 mg Oral Once   • lactated ringers infusion   Intravenous Continuous   • ampicillin (Omnipen) 2 g in sodium chloride 0.9% 100 mL IVPB-MBP  2 g Intravenous Once   • gentamicin (Garamycin) 460 mg in sodium chloride 0.9% 100 mL IVPB  5 mg/kg (Adjusted) Intravenous Once       Outpatient Medications:     Medications Prior to Admission   Medication Sig Dispense Refill Last Dose   • oxybutynin ER 10 MG Oral Tablet 24 Hr Take 1 tablet (10 mg total) by mouth daily. 30 tablet 3 7/3/2024   • cholecalciferol 50 MCG (2000 UT) Oral Cap Take 1 capsule (2,000 Units total) by mouth daily.   Past Month   • sulfamethoxazole-trimethoprim -160 MG Oral Tab per tablet Take 1 tablet by mouth 2 (two) times daily. She will take it 2 days before and 2 days after surgery. 8 tablet 0 7/9/2024 at 2300   • NON FORMULARY Take by mouth daily. Beet root juice   Past Month   • acetaminophen 325 MG Oral Tab Take 1 tablet (325 mg total) by mouth every 6 (six) hours as needed for Pain.   Past Week   • cetirizine 10 MG Oral Tab Take 1 tablet (10 mg total) by mouth daily.   7/3/2024   • Fluticasone Propionate 50 MCG/ACT Nasal Suspension 1 spray by Each Nare route daily.   7/8/2024   • B Complex Vitamins (B COMPLEX OR) Take 1 tablet by mouth daily.   Past Month   •  Bromelains (BROMELAIN OR) Take 1 tablet by mouth daily.   Past Month   • CHROMIUM OR Take 1 tablet by mouth daily.   Past Month   • Coenzyme Q10 (COQ10 OR) Take 1 tablet by mouth daily.   Past Month   • COLLAGEN OR Take 1 tablet by mouth daily.   Past Month   • Multiple Vitamins-Minerals (MULTIVITAMIN ADULT OR) Take 1 tablet by mouth daily.   Past Month   • LYSINE OR Take 1 tablet by mouth daily.   Past Month   • Green Tea, Camillia sinensis, (GREEN TEA OR) Take 1 tablet by mouth daily.   Past Month   • Omega-3 Fatty Acids (OMEGA-3 FISH OIL OR) Take 1 capsule by mouth daily.   Past Month   • Probiotic Product (PROBIOTIC DAILY OR) Take 1 capsule by mouth daily.   Past Month       Allergies: Patient has no known allergies.      Anesthesia Evaluation    Patient summary reviewed.    Anesthetic Complications  (-) history of anesthetic complications         GI/Hepatic/Renal  Comment: nephrolithiasis                               Cardiovascular        Exercise tolerance: good     MET: >4    (+) obesity                                       Endo/Other    Negative endo/other ROS.                              Pulmonary    Negative pulmonary ROS.                       Neuro/Psych    Negative neuro/psych ROS.                              Past Surgical History:   Procedure Laterality Date   •      • Cystoscopy,insert ureteral stent     • Hernia surgery     •      • Other surgical history  2024    kidney stone   • Other surgical history  04/15/2024    stent exchange   • Repair ing hernia,5+y/o,reducibl     • Ventral hernia repair N/A 2016    Procedure: HERNIA VENTRAL REPAIR;  Surgeon: Vijay Randall MD;  Location:  MAIN OR     Social History     Socioeconomic History   • Marital status:    Tobacco Use   • Smoking status: Every Day     Current packs/day: 1.00     Average packs/day: 1 pack/day for 30.0 years (30.0 ttl pk-yrs)     Types: Cigarettes     Passive exposure: Current   Vaping Use   • Vaping  status: Former   Substance and Sexual Activity   • Alcohol use: No     Alcohol/week: 0.0 standard drinks of alcohol   • Drug use: No     History   Drug Use No     Available pre-op labs reviewed.  Lab Results   Component Value Date    WBC 9.3 07/05/2024    RBC 5.21 (H) 07/05/2024    HGB 15.3 07/05/2024    HCT 46.0 (H) 07/05/2024    MCV 88.3 07/05/2024    MCH 29.4 07/05/2024    MCHC 33.3 07/05/2024    RDW 14.9 07/05/2024     07/05/2024     Lab Results   Component Value Date     04/11/2024    K 5.0 04/11/2024     04/11/2024    CO2 29 04/11/2024    BUN 25 04/11/2024    CREATSERUM 1.20 (H) 04/11/2024     (H) 04/11/2024    CA 10.1 04/11/2024     Lab Results   Component Value Date    PT 10.0 07/05/2024    INR 0.9 07/05/2024         Airway      Mallampati: III  Mouth opening: >3 FB  TM distance: > 6 cm   Cardiovascular    Cardiovascular exam normal.         Dental  Comment: Denies chipped or loose teeth. Discussed risk of dental injury with anesthesia jeannine to weakened teeth.            Pulmonary    Pulmonary exam normal.                 Other findings        ASA: 3   Plan: general  NPO status verified and patient meets guidelines.          Plan/risks discussed with: patient            Present on Admission:  **None**

## 2024-07-10 NOTE — PROCEDURES
[facility]   part of Located within [facility]  Procedure Note    Kirstin Myers Patient Status:  Outpatient    3/24/1970 MRN [id number]   Location [facility] INTERVENTIONAL SUITES Attending Kip Ann MD    Day # 0 PCP None Pcp     Procedure: Preop PCNL, L nephroureterostomy with wire placement to bladder    Pre-Procedure Diagnosis:  Stones    Post-Procedure Diagnosis: Same    Anesthesia:  Local and Sedation    Findings:  5f kumpe catheter placed to urinary bladder with amplatz wire using lower pole access    Specimens: None    Blood Loss:  Minimal    Tourniquet Time: None  Complications:  None  Drains:  None    Secondary Diagnosis:  None    Otoniel Sutherland MD  7/10/2024

## 2024-07-10 NOTE — ANESTHESIA PROCEDURE NOTES
Airway  Date/Time: 7/10/2024 1:25 PM  Urgency: elective      General Information and Staff    Patient location during procedure: OR  Anesthesiologist: Lacey Barraza MD  Performed: anesthesiologist   Performed by: Lacey Barraza MD  Authorized by: Lacey Barraza MD      Indications and Patient Condition  Indications for airway management: anesthesia  Sedation level: deep  Preoxygenated: yes  Patient position: sniffing  Mask difficulty assessment: 2 - vent by mask + OA or adjuvant +/- NMBA    Final Airway Details  Final airway type: endotracheal airway      Successful airway: ETT  Cuffed: yes   Successful intubation technique: Video laryngoscopy  Facilitating devices/methods: intubating stylet  Endotracheal tube insertion site: oral  Blade: GlideScope  Blade size: #3  ETT size (mm): 7.0    Cormack-Lehane Classification: grade I - full view of glottis  Placement verified by: capnometry   Measured from: lips  ETT to lips (cm): 23  Number of attempts at approach: 1

## 2024-07-10 NOTE — DISCHARGE INSTRUCTIONS
Instructions:    - No heavy lifting or strenuous activity for 1 day. You may resume regular activity tomorrow.  With increased activity you may notice more blood in the urine from stent movement inside the bladder.    - Your follow-up appointment is listed below. Please contact us at [phone] if you need to change your appointment.    Your appointments       Date & Time Appointment Department ([place])    Jul 18, 2024 10:30 AM CDT Procedure with Kip Ann MD [facility] ([place] [address])              [place] [address]  [address]  [facility] [address] [phone]           - You have a stent (small plastic tube) inside your kidney and ureter to allow the swelling from surgery to resolve. This is only temporary and must be removed, so you should not forget about it. We will remove your stent via a brief cystoscopy in clinic when you return. If you have to miss this appointment for some reason please make sure you re-schedule it.     - With a ureteral stent in place you may have some discomfort on your side/flank. You can feel pain worsen when you urinate, so try to avoid holding urine and void every 2-3 hours. You also may notice increased blood in the urine as you increase your activity. If this happens increase your hydration and take it easy for a day. Warm baths/hot packs can help with the discomfort as well as your prescribed medications and Advil/Motrin (if you are able to take these).     - You may experience mild pain after the procedure for a few days.  If the pain becomes intolerable please contact our office or go to the nearest Emergency Room or Urgent Care. You should take over the counter ibuprofen (AKA motrin, advil) for mild pain (provided you do not have a medical condition such as stomach ulcers or kidney disease which prohibits you from taking these). You may alternate  this with tylenol as well. If pain is still not relieved by tylenol and/or ibuprofen, you may take narcotic pain medication if prescribed (typically oxycodone or tramadol). If you are taking narcotic pain medication this can make you constipated, so you should take over the counter stool softeners or miralax if prescribed.    - Warm packs over the lower abdomen or hot baths often help with discomfort after cystoscopy.    - If you take blood thinners (such as aspirin or plavix) please hold these medications until 7 days after surgery.     - You may experience burning and frequency of urination over the next few days. This will improve after a few days if you stay well hydrated.     - You are likely to see some blood in your urine (pink or light red urine) that should clear up within a few days. Staying well hydrated should help this clear up. If you notice the urine stays dark red or there are multiple large blood clots despite good hydration, please call the urology clinic ([phone]).     - Try to abstain from alcohol, coffee, tea, artificial sweeteners, and spicy food for the next 48 hours as these can irritate the bladder.     - If you develop fevers / chills, difficulty urinating, or abdominal pain that does not improve with pain medications, please call the office.     - Drink 1.5 to 2 liters of fluid today (water is preferable). If you are on a fluid restriction due to other medical reasons then you need to adhere to your fluid restriction recommendations.    Kip Ann MD  Staff Urologist  [facility]  Office: [phone]

## 2024-07-10 NOTE — ANESTHESIA POSTPROCEDURE EVALUATION
[facility]    Kirstin Myers Patient Status:  Outpatient in a Bed   Age/Gender 54 year old female MRN [id number]   Location [facility] POST ANESTHESIA CARE UNIT Attending Kip Ann MD   Hosp Day # 0 PCP None Pcp       Anesthesia Post-op Note    LEFT PERCUTANEOUS NEPHROLITHOTOMY, LEFT URETEROSCOPY, URETERAL LEFT STENT EXCHANGE, CYSTOLITHOLAPAXY    Procedure Summary       Date: 07/10/24 Room / Location:  MAIN OR 14 / [facility] MAIN OR    Anesthesia Start: 1309 Anesthesia Stop: 1745    Procedure: LEFT PERCUTANEOUS NEPHROLITHOTOMY, LEFT URETEROSCOPY, URETERAL LEFT STENT EXCHANGE, CYSTOLITHOLAPAXY (Left) Diagnosis:       Nephrolithiasis      (Nephrolithiasis [N20.0])    Surgeons: Kip Ann MD Anesthesiologist: Lacey Barraza MD    Anesthesia Type: general ASA Status: 3            Anesthesia Type: general    Vitals Value Taken Time   /86 07/10/24 1752   Temp 97.0 07/10/24 1754   Pulse 74 07/10/24 1754   Resp 21 07/10/24 1754   SpO2 96 % 07/10/24 1754   Vitals shown include unfiled device data.    Patient Location: PACU    Anesthesia Type: general    Airway Patency: patent and extubated    Postop Pain Control: adequate    Mental Status: preanesthetic baseline    Nausea/Vomiting: none    Cardiopulmonary/Hydration status: stable euvolemic    Complications: no apparent anesthesia related complications    Postop vital signs: stable    Dental Exam: Unchanged from Preop    Patient to be discharged from PACU when criteria met.

## 2024-07-10 NOTE — OPERATIVE REPORT
Urology Operative Note    Attending Surgeon: Kip Ann MD    Assistant Surgeon: None    Patient Name: Kirstin Myers    Date of Surgery: 7/10/2024     Preoperative Diagnosis: Nephrolithiasis    Postoperative Diagnosis: Same    Procedure Performed:   Cystolitholapaxy  Bipolar bladder fulguration  LEFT percutaneous nephrolithotomy   Balloon dilation of nephrostomy tract, existing access  Fluoroscopy-guided placement of nephrostomy catheter, new access  Antegrade ureteral stent placement    Indication:  Patient is a 54 year old female who presented with a 4.3 cm branching, partial staghorn lower pole left renal stone. The patient was counseled on options, risks, and benefits and elected to undergo the above procedure. We discussed risks including, but not limited to, bleeding, infection, damage to surrounding structures, need for repeat procedure(s). The patient understood these risks and wished to proceed with surgery.    Findings:  Large bladder stone ~3cm surrounding entire distal coil of stent - treated with stone  and laser. Several areas of bladder fulgurated to get good hemostasis after stone treatment (area of fulguration 2cm). Total stone size 4.3 cm, including a branching partial staghorn. Upper pole IR access limited access to lateral half of large stone. Attempted lower pole new access - wire and angled tip catheter in correct position but unable to pass dual lumen or balloon into proper position. Able to treat the remainder of stone with antegrade ureteroscopy and laser lithotripsy.    Procedure:  The patient was taken to the IR suite previously for percutaneous nephroureteral access.  A wire/catheter was left in place and secured to the back.  The patient was brought to the operating room and a timeout was performed confirming the correct patient and procedure.  The patient underwent general anesthesia on the stretcher with ET tube placement.  Pre-operative prophylactic antibiotics were given  in the form of Ampicillin and Gentamicin.     Patient was prepped and draped in lithotomy position. Cystoscope was inserted.  Large stone was seen surrounding intact distal coil of the stent.  I used a combination of stone  and a 550 µm laser fiber to treat this large stone.  Eventually once the stone was treated and aspirated all pieces and the distal coil was grasped and pulled to the urethral meatus.  However there was resistance and the stent could not be fully removed due to likely encrustation of the proximal coil as well.  I put a snap on the distal stent coil to keep it secured and in place for later removal after treatment of proximal stone.  I used a bipolar loop to fulgurate a few areas of the bladder that were bleeding after stone treatment, mainly at the posterior midline bladder near the left ureteral orifice.    A 20 Grenadian Malave catheter was sterilely placed.  The patient was flipped into prone position.  Proper padding was ensured and she was secured in place with a strap and tape.  The patient was prepped and draped in prone position.    Fluoroscopy was used to confirm the placement of the previous wire and open-ended catheter by IR.  Both were confirmed in the bladder.  The wire placed by IR was removed leaving the open-ended catheter in place in the bladder.  I advanced a Amplatz Super Stiff wire through the catheter into the bladder.  I then removed the open-ended catheter.  I extended the skin incision to approximately 1.5 cm and used a Norma to gently spread the fascia and subcutaneous tissue.  I advanced a dual-lumen over the Amplatz Super Stiff wire into the renal pelvis and performed antegrade nephrostogram to outline the collecting system. I advanced the dual lumen into the proximal ureter.  I advanced a sensor wire through the dual-lumen and into the bladder.  I then removed the dual-lumen leaving both wires in place.    I advanced the 30 Grenadian balloon dilator over the wire with  the tip of the balloon into the calyx that was accessed previously.  I inflated the contrast-filled balloon up to 18 david and confirmed position with fluoroscopy.  I was then able to advance the 30 Citizen of [place] PCNL sheath over the balloon and into the calyx of interest.    I deflated the balloon and immediately inserted the nephroscope.  There was minimal bleeding.  A large stone was seen in the distal and lower pole of the kidney.  Using the Trilogy lithotripter I treated the medial portion of the stone that I could reach.  Due to upper pole access and lower pole stone I could not reach the lateral portion of the stone within the lower pole calyces.      Using the flexible cystoscope to attempt to push the stone into the renal pelvis but it was too large for this.  I decided to attempt obtaining a second access point into the lower pole calyx.     An 18-gauge spinal needle was inserted into a lower pole calyx using the Bulls-eye technique.  The inner cannula of the needle was removed to be dripping after we attached irrigation to be open ended catheter that was previously placed.  A sensor wire was advanced through the needle and eventually into the renal pelvis and coiled in the upper pole.  I made a 1.5 cm skin incision and used a Norma to gently spread the fascia and subcutaneous tissue. I attempted to use an angled tip catheter to advance this down the ureter but I was unsuccessful.  I exchanged the wire for a Super Stiff and removed the angle-tip catheter.  After some trial and her I attempted to pass the dual-lumen catheter but this would not pass into the renal pelvis or calyx.  I left the wire in place for the time being.    Then inserted a flexible ureteroscope to the original upper pole sheath.  I used a 200 µm laser fiber to treat the remainder of the lateral portion of this large stone.  Fortunately, the stone was relatively soft and broke up easily.  The ureteroscope and rigid nephroscope were exchanged  several times to break up stone pieces with laser and then suction them out with the trilogy lithotripter.  Stones appeared fully treated.     I then inserted a 6 Arabic x 28 centimeters double-J ureteral stent over the Super Stiff wire.  I used fluoroscopy to confirm the distal coil within the bladder, and then a combination of fluoroscopy and direct visualization to confirm the proximal coil within the renal pelvis.  The Super Stiff wire was removed with stent in proper position.  I inserted an 18 [place] tip Malave catheter to serve as a nephrostomy tube through the sheath into the renal pelvis. I inflated the balloon with 2 mL of contrast. I performed antegrade nephrostogram that showed minimal contrast extravasation. I then slowly removed the sheath under visualization of the scope and there was no significant bleeding. I cut the sheath off of the nephrostomy catheter. I gently removed the remaining wire, confirming stent position one more time and it did not move.      Local anesthesia was given in the form of mL 0.25% Marcaine.    I closed the skin incision partially with interrupted deep dermal sutures using 3-0 Vicryl.  I placed a 3-0 Nylon drain stitch.  I closed the lower pole access incision with interrupted deep dermal 3-0 Vicryl and covered it with skin glue.  Hemostasis was excellent.  I held pressure for 1 minute and then applied surgical skin glue over the incision.  The patient was cleaned and dried and I applied a pressure dressing using gauze, ABD pad, foam tape.    We placed the patient carefully back into the supine position on the stretcher, and then she was awoken from general anesthesia and transported to the PACU in stable condition. The patient tolerated the procedure well. All instrument/supply counts were correct at the end of the case.    Specimens:   Kidney stone fragments    Estimated Blood Loss:  25 mL    Tubes/Drains:  6 Fr x 28 cm JJ left ureteral stent  18 Fr [place] tip  Malave nephrostomy tube  20 Faroese urethral Malave catheter    Complications:   None immediate    Condition from OR:  Stable    Plan:   Admit to the floor overnight.  Labs and CT abdomen pelvis noncontrast in the morning.  Based on imaging findings we will decide plans for tube removal and/or need for additional surgery.      Kip Ann MD  Staff Urologist  [facility]  Office: [phone]

## 2024-07-10 NOTE — H&P
[facility]   part of [facility]   History & Physical    Kirsitn Myers Patient Status:  Outpatient    3/24/1970 MRN [id number]   Location [facility] INTERVENTIONAL SUITES Attending Kip Ann MD   Hosp Day # 0 PCP None Pcp     Admitting Diagnosis:   Renal stone    History of Present Illness:   55 yo F L obstructing stone for preop PCNL    History   Past Medical History:  Past Medical History:    Calculus of kidney    Prediabetes    Visual impairment    GLASSES       Past Surgical History:  Past Surgical History:   Procedure Laterality Date          Cystoscopy,insert ureteral stent      Hernia surgery            Other surgical history  2024    kidney stone    Other surgical history  04/15/2024    stent exchange    Repair ing hernia,5+y/o,reducibl      Ventral hernia repair N/A 2016    Procedure: HERNIA VENTRAL REPAIR;  Surgeon: Vijay Randall MD;  Location:  MAIN OR       Social History:  Social History     Tobacco Use    Smoking status: Every Day     Current packs/day: 1.00     Average packs/day: 1 pack/day for 30.0 years (30.0 ttl pk-yrs)     Types: Cigarettes     Passive exposure: Current    Smokeless tobacco: Not on file   Substance Use Topics    Alcohol use: No     Alcohol/week: 0.0 standard drinks of alcohol        Family History:  Family History   Problem Relation Age of Onset    Cancer Mother     Heart Disease Maternal Grandfather        Allergies/Medications:   Allergies:  No Known Allergies    Medications:  No current outpatient medications on file.    Physical Exam & Review of Systems:   Physical Exam:    LMP 2023 (Approximate)     General: NAD  Neck: No JVD  Lungs: CTA bilat  Heart: RRR, S1, S2  Abdomen: Soft, NT/ND, BS+x4  Extremities: Warm, dry, no LE edema bilat  Pulses: 2+ bilat DP    Results:   Labs:  Recent Labs   Lab 24  1449   RBC 5.21*   HGB 15.3   HCT 46.0*   MCV 88.3   MCH 29.4   MCHC 33.3   RDW 14.9   WBC 9.3        Recent Labs    Lab 07/05/24  1449   INR 0.9     No results for input(s): \"GLU\", \"BUN\", \"CREATSERUM\", \"GFRAA\", \"GFRNAA\", \"CA\", \"NA\", \"K\", \"CL\", \"CO2\" in the last 168 hours.    Assessment/Plan:   Impression: 55 yo F preop PCNL access for renal stone    I have discussed with the patient and/or legal representative the potential benefits, risks, and side effects of this procedure, the likelihood of the patient achieving goals; and the potential problems that might occur during recuperation.  I discussed reasonable alternatives to the procedure, including risks, benefits and side effects related to the alternatives, and risks related to not receiving this procedure.      Recommendations: L nephroureterostomy with wire to urinary bladder or L PCN placement    Otoniel Sutherland MD  7/10/2024  1:00 PM

## 2024-07-11 ENCOUNTER — APPOINTMENT (OUTPATIENT)
Dept: CT IMAGING | Facility: HOSPITAL | Age: 54
End: 2024-07-11
Attending: SURGERY
Payer: MEDICAID

## 2024-07-11 ENCOUNTER — TELEPHONE (OUTPATIENT)
Dept: SURGERY | Facility: CLINIC | Age: 54
End: 2024-07-11

## 2024-07-11 ENCOUNTER — APPOINTMENT (OUTPATIENT)
Dept: GENERAL RADIOLOGY | Facility: HOSPITAL | Age: 54
End: 2024-07-11
Attending: SURGERY
Payer: MEDICAID

## 2024-07-11 VITALS
DIASTOLIC BLOOD PRESSURE: 48 MMHG | SYSTOLIC BLOOD PRESSURE: 99 MMHG | WEIGHT: 293 LBS | HEART RATE: 96 BPM | BODY MASS INDEX: 43.4 KG/M2 | TEMPERATURE: 99 F | HEIGHT: 69 IN | OXYGEN SATURATION: 92 % | RESPIRATION RATE: 18 BRPM

## 2024-07-11 DIAGNOSIS — N20.0 NEPHROLITHIASIS: Primary | ICD-10-CM

## 2024-07-11 LAB
ANION GAP SERPL CALC-SCNC: 4 MMOL/L (ref 0–18)
BUN BLD-MCNC: 14 MG/DL (ref 9–23)
CALCIUM BLD-MCNC: 8.8 MG/DL (ref 8.5–10.1)
CHLORIDE SERPL-SCNC: 106 MMOL/L (ref 98–112)
CO2 SERPL-SCNC: 28 MMOL/L (ref 21–32)
CREAT BLD-MCNC: 1.18 MG/DL
EGFRCR SERPLBLD CKD-EPI 2021: 54 ML/MIN/1.73M2 (ref 60–?)
ERYTHROCYTE [DISTWIDTH] IN BLOOD BY AUTOMATED COUNT: 15.4 %
GLUCOSE BLD-MCNC: 140 MG/DL (ref 70–99)
HCT VFR BLD AUTO: 44.4 %
HGB BLD-MCNC: 14 G/DL
MCH RBC QN AUTO: 28.7 PG (ref 26–34)
MCHC RBC AUTO-ENTMCNC: 31.5 G/DL (ref 31–37)
MCV RBC AUTO: 91.2 FL
OSMOLALITY SERPL CALC.SUM OF ELEC: 289 MOSM/KG (ref 275–295)
PLATELET # BLD AUTO: 297 10(3)UL (ref 150–450)
POTASSIUM SERPL-SCNC: 4.6 MMOL/L (ref 3.5–5.1)
RBC # BLD AUTO: 4.87 X10(6)UL
SODIUM SERPL-SCNC: 138 MMOL/L (ref 136–145)
WBC # BLD AUTO: 14.8 X10(3) UL (ref 4–11)

## 2024-07-11 PROCEDURE — 74176 CT ABD & PELVIS W/O CONTRAST: CPT | Performed by: SURGERY

## 2024-07-11 PROCEDURE — 85027 COMPLETE CBC AUTOMATED: CPT | Performed by: SURGERY

## 2024-07-11 PROCEDURE — 94799 UNLISTED PULMONARY SVC/PX: CPT

## 2024-07-11 PROCEDURE — 80048 BASIC METABOLIC PNL TOTAL CA: CPT | Performed by: SURGERY

## 2024-07-11 PROCEDURE — 71045 X-RAY EXAM CHEST 1 VIEW: CPT | Performed by: SURGERY

## 2024-07-11 RX ORDER — TRAMADOL HYDROCHLORIDE 50 MG/1
50 TABLET ORAL EVERY 6 HOURS PRN
Qty: 15 TABLET | Refills: 0 | Status: SHIPPED | OUTPATIENT
Start: 2024-07-11

## 2024-07-11 RX ORDER — SULFAMETHOXAZOLE AND TRIMETHOPRIM 800; 160 MG/1; MG/1
1 TABLET ORAL 2 TIMES DAILY
Qty: 4 TABLET | Refills: 0 | Status: SHIPPED | OUTPATIENT
Start: 2024-07-11 | End: 2024-07-16

## 2024-07-11 NOTE — TELEPHONE ENCOUNTER
Contacted the patient to schedule. Will look for dates within 2-3 weeks and return call her back with possible dates.   .

## 2024-07-11 NOTE — TELEPHONE ENCOUNTER
Hi,    Can you please schedule this patient for surgery.    Also, can you arrange for the patient to drop a urine sample for urine culture 1-2 weeks prior to the scheduled surgery date?     Thanks,  Kip     Urology Surgery Request  Surgeon: Kip Ann  Location (if known): [facility]  Procedure: Cystoscopy, LEFT ureteroscopy, laser lithotripsy, stent exchange  Anesthesia: General   Time Frame: Next 2-3 weeks  Time required: 45 minutes  Diagnosis: Nephrolithiasis  Special Equipment: C-arm    Antibiotics: per hospital protocol unless checked below   ___ Levaquin 500 mg IV   ___ Gemcitabine 2 g/100 mL NS bladder instillation to be given in OR    Estimated Post Op/Follow Up Appt:   1 week for cystoscopy/stent removal in clinic with me. Please schedule appointment at time of surgery scheduling.

## 2024-07-11 NOTE — PROGRESS NOTES
NURSING DISCHARGE NOTE    Discharged Home via Wheelchair.  Accompanied by Family member  Belongings Taken by patient/family.    Patient discharged home in stable condition. Patient left the floor via wheelchair and was accompanied by her daughter. Discharge instructions given and were explained in detail. Prescriptions for Bactrim and Tramadol were e-prescribed to patient's pharmacy-educated on the uses and when to take the next doses. Educated patient on activity restrictions, incision care, and on signs of infection. Instructed patient to schedule stone procedure per 's office. Dressing supplies sent home with patient for nephrostomy tube site. Patient stated understanding of discharge instructions and had no further questions at this time. Saline lock removed.

## 2024-07-11 NOTE — DISCHARGE SUMMARY
[facility]  Discharge Summary    Kirstin Myers Patient Status:  Outpatient in a Bed    3/24/1970 MRN [id number]   Location [facility] 3NW-A Attending Kip Ann MD   Hosp Day # 0 PCP None Pcp     Date of Admission: 7/10/2024    Date of Discharge: 24    Admitting Diagnosis: Nephrolithiasis [N20.0]  Nephrolithiasis    Discharge Diagnosis:   Patient Active Problem List   Diagnosis    Ventral hernia without obstruction or gangrene    Incarcerated ventral hernia    Pyelonephritis    Leukocytosis, unspecified type    Nephrolithiasis    Hydronephrosis with urinary obstruction due to renal calculus    Bladder stone       Reason for Admission: overnight observation    Physical Exam: No distress  Non labored respirations  Abdomen soft, NTND  No CVAT appreciated    History of Present Illness: Patient is a 54 year old female who presented with a 4.3 cm branching, partial staghorn lower pole left renal stone. The patient was counseled on options, risks, and benefits and elected to undergo the above procedure. We discussed risks including, but not limited to, bleeding, infection, damage to surrounding structures, need for repeat procedure(s). The patient understood these risks and wished to proceed with surgery.     Hospital Course: After access was obtain by intervention radiology and informed consent was obtained for left percutaneous nephrolithotomy, the patient was brought to the OR where aforementioned procedure was completed without any intraoperative complication. Patient was transferred from PACU to the floor without event. Patient progressed as expected on the floor. CT scan obtained that showed successful treatment of stone burden. Nephrostomy tube and marmoleoj catheter were removed sequentially at appropriate timed interval. Patient is voiding freely without any difficulty. Patient has tolerated advancement of diet with appropriate return of bowel function. Pain is well controlled on oral pain  medications and patient is ambulatory. Restrictions and post op instructions were reviewed. Patient will follow up as scheduled.    Consultations: none    Procedures:   Cystolitholapaxy  Bipolar bladder fulguration  LEFT percutaneous nephrolithotomy   Balloon dilation of nephrostomy tract, existing access  Fluoroscopy-guided placement of nephrostomy catheter, new access  Antegrade ureteral stent placement    Complications: none    Disposition: Home or Self Care    Discharge Condition: Good    Discharge Medications:   Current Discharge Medication List        CONTINUE these medications which have NOT CHANGED    Details   oxybutynin ER 10 MG Oral Tablet 24 Hr Take 1 tablet (10 mg total) by mouth daily.  Qty: 30 tablet, Refills: 3      cholecalciferol 50 MCG (2000 UT) Oral Cap Take 1 capsule (2,000 Units total) by mouth daily.      sulfamethoxazole-trimethoprim -160 MG Oral Tab per tablet Take 1 tablet by mouth 2 (two) times daily. She will take it 2 days before and 2 days after surgery.  Qty: 8 tablet, Refills: 0      NON FORMULARY Take by mouth daily. Beet root juice      acetaminophen 325 MG Oral Tab Take 1 tablet (325 mg total) by mouth every 6 (six) hours as needed for Pain.      cetirizine 10 MG Oral Tab Take 1 tablet (10 mg total) by mouth daily.      Fluticasone Propionate 50 MCG/ACT Nasal Suspension 1 spray by Each Nare route daily.      B Complex Vitamins (B COMPLEX OR) Take 1 tablet by mouth daily.      Bromelains (BROMELAIN OR) Take 1 tablet by mouth daily.      CHROMIUM OR Take 1 tablet by mouth daily.      Coenzyme Q10 (COQ10 OR) Take 1 tablet by mouth daily.      COLLAGEN OR Take 1 tablet by mouth daily.      Multiple Vitamins-Minerals (MULTIVITAMIN ADULT OR) Take 1 tablet by mouth daily.      LYSINE OR Take 1 tablet by mouth daily.      Green Tea, Camillia sinensis, (GREEN TEA OR) Take 1 tablet by mouth daily.      Omega-3 Fatty Acids (OMEGA-3 FISH OIL OR) Take 1 capsule by mouth daily.       Probiotic Product (PROBIOTIC DAILY OR) Take 1 capsule by mouth daily.             Latoya Strong PA-C  7/11/2024  9:16 AM

## 2024-07-11 NOTE — PLAN OF CARE
NURSING ADMISSION NOTE      Patient admitted via Cart  Oriented to room.  Safety precautions initiated.  Bed in low position.  Call light in reach.    2045: Pt arrived to floor    Patient alert and oriented x4, vital signs stable on CPAP. Malave in place, nephrostomy tube in place. Pain controlled per mar. Pt tolerating diet. On tele for possible undiagnosed sleep apnea. Safety measures in place, questions addressed, plan of care discussed with patient.

## 2024-07-12 ENCOUNTER — TELEPHONE (OUTPATIENT)
Dept: SURGERY | Facility: CLINIC | Age: 54
End: 2024-07-12

## 2024-07-12 NOTE — TELEPHONE ENCOUNTER
Per patient had surgery 2 days ago, has questions regarding showering, requesting to speak to RN. Please call thank you.

## 2024-07-12 NOTE — TELEPHONE ENCOUNTER
RN received a message from [employer] inquiring diagnosis code for Prothrombin time. Medicare does not accept R82.90 code. RN faxed the information and corrected it to : M25.21

## 2024-07-15 ENCOUNTER — ANESTHESIA EVENT (OUTPATIENT)
Dept: SURGERY | Facility: HOSPITAL | Age: 54
End: 2024-07-15
Payer: MEDICAID

## 2024-07-15 NOTE — H&P
UROLOGY PRE-OPERATIVE HISTORY & PHYSICAL      Kirstin Myers Patient Status:  Hospital Outpatient Surgery    3/24/1970 MRN [id number]   [facility] SURGERY Attending Kip Ann MD   Hosp Day # 0 PCP None Pcp     Primary Care Provider: Pcp, None     Chief Complaint:   Nephrolithiasis     HPI:   Kirstin Myers is a 54 year old female with history of diabetes who was seen by SAIDA Wynn on 2024 for nephrolithiasis.  She presented with left flank pain and signs of infection.  CT 2024 shows moderate to severe hydronephrosis related to a large obstructing partial staghorn UPJ stone measuring 4.3 cm, as well as a 7 mm lower pole stone.  No right renal stones present.  She did have a stent placed by Dr. Pena on 2024.  She was unable to follow-up with [facility] due to insurance.     Repeat CT scan showed complete resolution of hydronephrosis, less parenchymal thinning than prior.  MAG3 renal scan on 3/22/2024 showed 25% function of the left kidney and 75% function of the right kidney.  Post-Lasix left T1/2 was 26 minutes on the left.     Given decent function of the left kidney I discussed her options again of PCNL versus nephrectomy.  I do think it is worth saving his kidney and perform PCNL given 25% function.  The obstruction noted on renal scan could be related to the large stone burden.     Scheduled for PCNL on 24.  I brought her to the OR on 4/15/2024 for stent exchange.  Her stent was significantly encrusted and needed to use a stone  to treat a large stone within the bladder around the distal coil of the stent.  She was scheduled for PCNL a few weeks after that, but needed to cancel due to upper respiratory infection.    I brought her to the OR on 7/10/2024 for cystolitholapaxy of large distal stent coil stone, bladder fulguration, left PCNL.  She did well and was discharged on postoperative day 1.  Postoperative CT shows an 8 mm left lower pole stone  fragment she returns for definitive stone treatment with ureteroscopy today.      History:     Past Medical History:    Calculus of kidney    Prediabetes    Visual impairment    GLASSES       Past Surgical History:   Procedure Laterality Date          Cystoscopy,insert ureteral stent      7/10/24 LEFT percutaneous nephrolithotomy    Hernia surgery            Other surgical history  2024    kidney stone    Other surgical history  04/15/2024    stent exchange    Other surgical history  07/10/2024    NEPHROSTOMY TUBE IN  PLACE    Repair ing hernia,5+y/o,reducibl      Ventral hernia repair N/A 2016    Procedure: HERNIA VENTRAL REPAIR;  Surgeon: Vijay Randall MD;  Location:  MAIN OR       Family History   Problem Relation Age of Onset    Cancer Mother     Heart Disease Maternal Grandfather        Social History     Socioeconomic History    Marital status:    Tobacco Use    Smoking status: Every Day     Current packs/day: 1.00     Average packs/day: 1 pack/day for 30.0 years (30.0 ttl pk-yrs)     Types: Cigarettes     Passive exposure: Current   Vaping Use    Vaping status: Former   Substance and Sexual Activity    Alcohol use: No     Alcohol/week: 0.0 standard drinks of alcohol    Drug use: No     Social Determinants of Health     Food Insecurity: No Food Insecurity (7/10/2024)    Food Insecurity     Food Insecurity: Never true   Transportation Needs: No Transportation Needs (7/10/2024)    Transportation Needs     Lack of Transportation: No   Housing Stability: Low Risk  (7/10/2024)    Housing Stability     Housing Instability: No       Medications:  Current Outpatient Medications   Medication Sig Dispense Refill    traMADol 50 MG Oral Tab Take 1 tablet (50 mg total) by mouth every 6 (six) hours as needed for Pain. 15 tablet 0    oxybutynin ER 10 MG Oral Tablet 24 Hr Take 1 tablet (10 mg total) by mouth daily. 30 tablet 3    sulfamethoxazole-trimethoprim -160 MG Oral Tab per tablet  Take 1 tablet by mouth 2 (two) times daily. She will take it 2 days before and 2 days after surgery. 8 tablet 0    acetaminophen 325 MG Oral Tab Take 1 tablet (325 mg total) by mouth every 6 (six) hours as needed for Pain.      Fluticasone Propionate 50 MCG/ACT Nasal Suspension 1 spray by Each Nare route daily.      cholecalciferol 50 MCG (2000 UT) Oral Cap Take 1 capsule (2,000 Units total) by mouth daily.      NON FORMULARY Take by mouth daily. Beet root juice      cetirizine 10 MG Oral Tab Take 1 tablet (10 mg total) by mouth daily.      B Complex Vitamins (B COMPLEX OR) Take 1 tablet by mouth daily.      Bromelains (BROMELAIN OR) Take 1 tablet by mouth daily.      CHROMIUM OR Take 1 tablet by mouth daily.      Coenzyme Q10 (COQ10 OR) Take 1 tablet by mouth daily.      COLLAGEN OR Take 1 tablet by mouth daily.      Multiple Vitamins-Minerals (MULTIVITAMIN ADULT OR) Take 1 tablet by mouth daily.      LYSINE OR Take 1 tablet by mouth daily.      Green Tea, Camillia sinensis, (GREEN TEA OR) Take 1 tablet by mouth daily.      Omega-3 Fatty Acids (OMEGA-3 FISH OIL OR) Take 1 capsule by mouth daily.      Probiotic Product (PROBIOTIC DAILY OR) Take 1 capsule by mouth daily.         Allergies:  No Known Allergies    Review of Systems:   A comprehensive 10-point review of systems was completed.  Pertinent positives and negatives are noted in the the HPI.    Physical Exam:   Vital Signs:  Height 5' 9\" (1.753 m), weight 293 lb (132.9 kg), last menstrual period 04/01/2023.     CONSTITUTIONAL: Well developed, well nourished, in no acute distress  NEUROLOGIC: Alert and oriented  HEAD: Normocephalic, atraumatic  EYES: Sclera non-icteric  ENT: Hearing intact, moist mucous membranes  NECK: No obvious goiter or masses  RESPIRATORY: Normal respiratory effort  SKIN: No evident rashes  ABDOMEN: Soft, non-tender, non-distended    Laboratory Data:  Lab Results   Component Value Date    WBC 14.8 (H) 07/11/2024    HGB 14.0 07/11/2024     .0 07/11/2024     Lab Results   Component Value Date     07/11/2024    K 4.6 07/11/2024     07/11/2024    CO2 28.0 07/11/2024    BUN 14 07/11/2024     (H) 07/11/2024    AST 15 04/11/2024    ALT 14 04/11/2024    TP 7.3 04/11/2024    ALB 4.1 04/11/2024    CA 8.8 07/11/2024       Urinalysis Results (last three years):  Recent Labs     01/22/24  0050 02/20/24  1209 03/05/24  1321   COLORUR Yellow  --   --    CLARITY Turbid*  --   --    SPECGRAVITY 1.020 1.025 1.025   PHURINE 6.5 6.0 7.0   PROUR 50*  --   --    GLUUR Normal  --   --    KETUR Negative  --   --    BILUR Negative  --   --    BLOODURINE Negative  --   --    NITRITE 2+* Negative Negative   UROBILINOGEN 3*  --   --    LEUUR 500*  --   --    WBCUR >50*  --   --    RBCUR 3-5*  --   --    BACUR 3+*  --   --        Urine Culture Results (last three years):  Lab Results   Component Value Date    URINECUL  03/05/2024     >100,000 cfu/ml Multiple species present- probable contamination.    URINECUL No Growth 2 Days 02/20/2024    URINECUL >100,000 CFU/ML Escherichia coli (A) 01/22/2024    URINECUL >100,000 CFU/ML Proteus mirabilis (A) 01/22/2024    URINECUL >100,000 CFU/ML Escherichia coli (A) 01/22/2024       PSA:  No results found for: \"PSA\", \"PERCENTPSA\", \"PSAS\", \"PSAULTRA\"     Imaging (last three days):  No results found.     Assessment:   Kirstin Myers is a 54 year old female with history of diabetes who was seen by SAIDA Wynn on 2/20/2024 for nephrolithiasis.  She presented with left flank pain and signs of infection.  CT 1/22/2024 shows moderate to severe hydronephrosis related to a large obstructing partial staghorn UPJ stone measuring 4.3 cm, as well as a 7 mm lower pole stone.  No right renal stones present.  She did have a stent placed by Dr. Pena on 1/22/2024.  She was unable to follow-up with [facility] due to insurance.     Repeat CT scan showed complete resolution of hydronephrosis, less parenchymal thinning than  prior.  MAG3 renal scan on 3/22/2024 showed 25% function of the left kidney and 75% function of the right kidney.  Post-Lasix left T1/2 was 26 minutes on the left.     Given decent function of the left kidney I discussed her options again of PCNL versus nephrectomy.  I do think it is worth saving his kidney and perform PCNL given 25% function.  The obstruction noted on renal scan could be related to the large stone burden.     Scheduled for PCNL on 5/22/24.  I brought her to the OR on 4/15/2024 for stent exchange.  Her stent was significantly encrusted and needed to use a stone  to treat a large stone within the bladder around the distal coil of the stent.  She was scheduled for PCNL a few weeks after that, but needed to cancel due to upper respiratory infection.    I brought her to the OR on 7/10/2024 for cystolitholapaxy of large distal stent coil stone, bladder fulguration, left PCNL.  She did well and was discharged on postoperative day 1.  Postoperative CT shows an 8 mm left lower pole stone fragment she returns for definitive stone treatment with ureteroscopy today.    Plan:   - OR for cystoscopy, left ureteroscopy, laser lithotripsy, stone extraction, ureteral stent exchange  - Informed consent obtained - risks and benefits explained, and all questions answered    I have personally reviewed all relevant medical records, labs, and imaging.     Kip Ann MD  Staff Urologist  [facility]  Office: [phone]

## 2024-07-15 NOTE — TELEPHONE ENCOUNTER
This encounter is now closed.     RN called patient. She said, she already spoke to Dr Ann that she is ok to have a shower. She has surgery scheduled tomorrow, 7/16 and follow up as scheduled.

## 2024-07-16 ENCOUNTER — ANESTHESIA (OUTPATIENT)
Dept: SURGERY | Facility: HOSPITAL | Age: 54
End: 2024-07-16
Payer: MEDICAID

## 2024-07-16 ENCOUNTER — APPOINTMENT (OUTPATIENT)
Dept: GENERAL RADIOLOGY | Facility: HOSPITAL | Age: 54
End: 2024-07-16
Attending: SURGERY
Payer: MEDICAID

## 2024-07-16 ENCOUNTER — HOSPITAL ENCOUNTER (OUTPATIENT)
Facility: HOSPITAL | Age: 54
Setting detail: HOSPITAL OUTPATIENT SURGERY
Discharge: HOME | End: 2024-07-16
Attending: SURGERY | Admitting: SURGERY
Payer: MEDICAID

## 2024-07-16 VITALS
WEIGHT: 291 LBS | TEMPERATURE: 98 F | RESPIRATION RATE: 15 BRPM | HEART RATE: 66 BPM | BODY MASS INDEX: 43.1 KG/M2 | SYSTOLIC BLOOD PRESSURE: 119 MMHG | DIASTOLIC BLOOD PRESSURE: 62 MMHG | HEIGHT: 69 IN | OXYGEN SATURATION: 92 %

## 2024-07-16 DIAGNOSIS — N20.0 NEPHROLITHIASIS: ICD-10-CM

## 2024-07-16 PROCEDURE — BT1F1ZZ FLUOROSCOPY OF LEFT KIDNEY, URETER AND BLADDER USING LOW OSMOLAR CONTRAST: ICD-10-PCS | Performed by: SURGERY

## 2024-07-16 PROCEDURE — 74420 UROGRAPHY RTRGR +-KUB: CPT | Performed by: SURGERY

## 2024-07-16 PROCEDURE — 0TC18ZZ EXTIRPATION OF MATTER FROM LEFT KIDNEY, VIA NATURAL OR ARTIFICIAL OPENING ENDOSCOPIC: ICD-10-PCS | Performed by: SURGERY

## 2024-07-16 PROCEDURE — 0T778DZ DILATION OF LEFT URETER WITH INTRALUMINAL DEVICE, VIA NATURAL OR ARTIFICIAL OPENING ENDOSCOPIC: ICD-10-PCS | Performed by: SURGERY

## 2024-07-16 PROCEDURE — 52356 CYSTO/URETERO W/LITHOTRIPSY: CPT | Performed by: SURGERY

## 2024-07-16 DEVICE — URETERAL STENT
Type: IMPLANTABLE DEVICE | Site: URETER | Status: FUNCTIONAL
Brand: ASCERTA™

## 2024-07-16 RX ORDER — SULFAMETHOXAZOLE AND TRIMETHOPRIM 800; 160 MG/1; MG/1
1 TABLET ORAL 2 TIMES DAILY
Qty: 4 TABLET | Refills: 0 | Status: SHIPPED | OUTPATIENT
Start: 2024-07-16 | End: 2024-07-18

## 2024-07-16 RX ORDER — PROCHLORPERAZINE EDISYLATE 5 MG/ML
5 INJECTION INTRAMUSCULAR; INTRAVENOUS EVERY 8 HOURS PRN
Status: DISCONTINUED | OUTPATIENT
Start: 2024-07-16 | End: 2024-07-16

## 2024-07-16 RX ORDER — MIDAZOLAM HYDROCHLORIDE 1 MG/ML
1 INJECTION INTRAMUSCULAR; INTRAVENOUS EVERY 5 MIN PRN
Status: DISCONTINUED | OUTPATIENT
Start: 2024-07-16 | End: 2024-07-16

## 2024-07-16 RX ORDER — SODIUM CHLORIDE, SODIUM LACTATE, POTASSIUM CHLORIDE, CALCIUM CHLORIDE 600; 310; 30; 20 MG/100ML; MG/100ML; MG/100ML; MG/100ML
INJECTION, SOLUTION INTRAVENOUS CONTINUOUS
Status: DISCONTINUED | OUTPATIENT
Start: 2024-07-16 | End: 2024-07-16

## 2024-07-16 RX ORDER — LIDOCAINE HYDROCHLORIDE 10 MG/ML
INJECTION, SOLUTION EPIDURAL; INFILTRATION; INTRACAUDAL; PERINEURAL AS NEEDED
Status: DISCONTINUED | OUTPATIENT
Start: 2024-07-16 | End: 2024-07-16 | Stop reason: SURG

## 2024-07-16 RX ORDER — ONDANSETRON 2 MG/ML
INJECTION INTRAMUSCULAR; INTRAVENOUS AS NEEDED
Status: DISCONTINUED | OUTPATIENT
Start: 2024-07-16 | End: 2024-07-16 | Stop reason: SURG

## 2024-07-16 RX ORDER — KETOROLAC TROMETHAMINE 30 MG/ML
INJECTION, SOLUTION INTRAMUSCULAR; INTRAVENOUS AS NEEDED
Status: DISCONTINUED | OUTPATIENT
Start: 2024-07-16 | End: 2024-07-16 | Stop reason: SURG

## 2024-07-16 RX ORDER — NALOXONE HYDROCHLORIDE 0.4 MG/ML
0.08 INJECTION, SOLUTION INTRAMUSCULAR; INTRAVENOUS; SUBCUTANEOUS AS NEEDED
Status: DISCONTINUED | OUTPATIENT
Start: 2024-07-16 | End: 2024-07-16

## 2024-07-16 RX ORDER — ACETAMINOPHEN 500 MG
1000 TABLET ORAL ONCE AS NEEDED
Status: DISCONTINUED | OUTPATIENT
Start: 2024-07-16 | End: 2024-07-16

## 2024-07-16 RX ORDER — ONDANSETRON 2 MG/ML
4 INJECTION INTRAMUSCULAR; INTRAVENOUS EVERY 6 HOURS PRN
Status: DISCONTINUED | OUTPATIENT
Start: 2024-07-16 | End: 2024-07-16

## 2024-07-16 RX ORDER — ACETAMINOPHEN 500 MG
1000 TABLET ORAL ONCE
Status: DISCONTINUED | OUTPATIENT
Start: 2024-07-16 | End: 2024-07-16 | Stop reason: HOSPADM

## 2024-07-16 RX ORDER — HYDROMORPHONE HYDROCHLORIDE 1 MG/ML
0.6 INJECTION, SOLUTION INTRAMUSCULAR; INTRAVENOUS; SUBCUTANEOUS EVERY 5 MIN PRN
Status: DISCONTINUED | OUTPATIENT
Start: 2024-07-16 | End: 2024-07-16

## 2024-07-16 RX ORDER — DEXAMETHASONE SODIUM PHOSPHATE 4 MG/ML
VIAL (ML) INJECTION AS NEEDED
Status: DISCONTINUED | OUTPATIENT
Start: 2024-07-16 | End: 2024-07-16 | Stop reason: SURG

## 2024-07-16 RX ORDER — SCOLOPAMINE TRANSDERMAL SYSTEM 1 MG/1
1 PATCH, EXTENDED RELEASE TRANSDERMAL ONCE
Status: DISCONTINUED | OUTPATIENT
Start: 2024-07-16 | End: 2024-07-16 | Stop reason: HOSPADM

## 2024-07-16 RX ORDER — PHENYLEPHRINE HCL 10 MG/ML
VIAL (ML) INJECTION AS NEEDED
Status: DISCONTINUED | OUTPATIENT
Start: 2024-07-16 | End: 2024-07-16 | Stop reason: SURG

## 2024-07-16 RX ORDER — HYDROMORPHONE HYDROCHLORIDE 1 MG/ML
0.2 INJECTION, SOLUTION INTRAMUSCULAR; INTRAVENOUS; SUBCUTANEOUS EVERY 5 MIN PRN
Status: DISCONTINUED | OUTPATIENT
Start: 2024-07-16 | End: 2024-07-16

## 2024-07-16 RX ORDER — HYDROCODONE BITARTRATE AND ACETAMINOPHEN 10; 325 MG/1; MG/1
2 TABLET ORAL ONCE AS NEEDED
Status: DISCONTINUED | OUTPATIENT
Start: 2024-07-16 | End: 2024-07-16

## 2024-07-16 RX ORDER — CEFAZOLIN SODIUM IN 0.9 % NACL 3 G/100 ML
3 INTRAVENOUS SOLUTION, PIGGYBACK (ML) INTRAVENOUS ONCE
Status: DISCONTINUED | OUTPATIENT
Start: 2024-07-16 | End: 2024-07-16

## 2024-07-16 RX ORDER — HYDROMORPHONE HYDROCHLORIDE 1 MG/ML
0.4 INJECTION, SOLUTION INTRAMUSCULAR; INTRAVENOUS; SUBCUTANEOUS EVERY 5 MIN PRN
Status: DISCONTINUED | OUTPATIENT
Start: 2024-07-16 | End: 2024-07-16

## 2024-07-16 RX ORDER — HYDROCODONE BITARTRATE AND ACETAMINOPHEN 10; 325 MG/1; MG/1
1 TABLET ORAL ONCE AS NEEDED
Status: DISCONTINUED | OUTPATIENT
Start: 2024-07-16 | End: 2024-07-16

## 2024-07-16 RX ADMIN — KETOROLAC TROMETHAMINE 30 MG: 30 INJECTION, SOLUTION INTRAMUSCULAR; INTRAVENOUS at 08:01:00

## 2024-07-16 RX ADMIN — PHENYLEPHRINE HCL 100 MCG: 10 MG/ML VIAL (ML) INJECTION at 07:33:00

## 2024-07-16 RX ADMIN — DEXAMETHASONE SODIUM PHOSPHATE 8 MG: 4 MG/ML VIAL (ML) INJECTION at 07:21:00

## 2024-07-16 RX ADMIN — LIDOCAINE HYDROCHLORIDE 50 MG: 10 INJECTION, SOLUTION EPIDURAL; INFILTRATION; INTRACAUDAL; PERINEURAL at 08:06:00

## 2024-07-16 RX ADMIN — ONDANSETRON 4 MG: 2 INJECTION INTRAMUSCULAR; INTRAVENOUS at 07:36:00

## 2024-07-16 RX ADMIN — PHENYLEPHRINE HCL 100 MCG: 10 MG/ML VIAL (ML) INJECTION at 07:56:00

## 2024-07-16 RX ADMIN — LIDOCAINE HYDROCHLORIDE 50 MG: 10 INJECTION, SOLUTION EPIDURAL; INFILTRATION; INTRACAUDAL; PERINEURAL at 07:16:00

## 2024-07-16 RX ADMIN — SODIUM CHLORIDE, SODIUM LACTATE, POTASSIUM CHLORIDE, CALCIUM CHLORIDE: 600; 310; 30; 20 INJECTION, SOLUTION INTRAVENOUS at 07:08:00

## 2024-07-16 NOTE — ANESTHESIA PREPROCEDURE EVALUATION
PRE-OP EVALUATION    Patient Name: Kirstin Myers    Admit Diagnosis: Nephrolithiasis [N20.0]    Pre-op Diagnosis: Nephrolithiasis [N20.0]    CYSTOSCOPY, LEFT URETEROSCOPY, LASER LITHOTRIPSY, STENT EXCHANGE    Anesthesia Procedure: CYSTOSCOPY, LEFT URETEROSCOPY, LASER LITHOTRIPSY, STENT EXCHANGE (Left)    Surgeons and Role:     * Kip Ann MD - Primary    Pre-op vitals reviewed.        Body mass index is 43.27 kg/m².    Current medications reviewed.  Hospital Medications:   [COMPLETED] gentamicin (Garamycin) 460 mg in sodium chloride 0.9% 100 mL IVPB  5 mg/kg (Adjusted) Intravenous Once       Outpatient Medications:     No medications prior to admission.       Allergies: Patient has no known allergies.      Anesthesia Evaluation    Patient summary reviewed.    Anesthetic Complications           GI/Hepatic/Renal                                 Cardiovascular                (+) obesity                                       Endo/Other                                  Pulmonary                           Neuro/Psych                              Bladder stone Hydronephrosis with urinary obstruction due to renal calculus  Incarcerated ventral hernia Leukocytosis, unspecified type  Nephrolithiasis Pyelonephritis  Ventral hernia without obstruction or gangrene             Past Surgical History:   Procedure Laterality Date          Cystoscopy,insert ureteral stent      7/10/24 LEFT percutaneous nephrolithotomy    Hernia surgery            Other surgical history  2024    kidney stone    Other surgical history  04/15/2024    stent exchange    Other surgical history  07/10/2024    NEPHROSTOMY TUBE IN  PLACE    Repair ing hernia,5+y/o,reducibl      Ventral hernia repair N/A 2016    Procedure: HERNIA VENTRAL REPAIR;  Surgeon: Vijay Randall MD;  Location:  MAIN OR     Social History     Socioeconomic History    Marital status:    Tobacco Use    Smoking status: Every Day     Current packs/day:  1.00     Average packs/day: 1 pack/day for 30.0 years (30.0 ttl pk-yrs)     Types: Cigarettes     Passive exposure: Current   Vaping Use    Vaping status: Former   Substance and Sexual Activity    Alcohol use: No     Alcohol/week: 0.0 standard drinks of alcohol    Drug use: No     History   Drug Use No     Available pre-op labs reviewed.  Lab Results   Component Value Date    WBC 14.8 (H) 07/11/2024    WBC 9.3 07/05/2024    RBC 4.87 07/11/2024    RBC 5.21 (H) 07/05/2024    HGB 14.0 07/11/2024    HGB 15.3 07/05/2024    HCT 44.4 07/11/2024    HCT 46.0 (H) 07/05/2024    MCV 91.2 07/11/2024    MCV 88.3 07/05/2024    MCH 28.7 07/11/2024    MCH 29.4 07/05/2024    MCHC 31.5 07/11/2024    MCHC 33.3 07/05/2024    RDW 15.4 07/11/2024    RDW 14.9 07/05/2024    .0 07/11/2024     07/05/2024     Lab Results   Component Value Date     07/11/2024    K 4.6 07/11/2024     07/11/2024    CO2 28.0 07/11/2024    BUN 14 07/11/2024    CREATSERUM 1.18 (H) 07/11/2024     (H) 07/11/2024    CA 8.8 07/11/2024     Lab Results   Component Value Date    PT 10.0 07/05/2024    INR 0.9 07/05/2024         Airway      Mallampati: II  Mouth opening: >3 FB  TM distance: > 6 cm  Neck ROM: limited Cardiovascular    Cardiovascular exam normal.         Dental    Dentition appears grossly intact         Pulmonary    Pulmonary exam normal.                 Other findings              ASA: 3   Plan: general  NPO status verified and           Plan/risks discussed with: patient                Present on Admission:  **None**

## 2024-07-16 NOTE — ANESTHESIA PROCEDURE NOTES
Airway  Date/Time: 7/16/2024 7:16 AM  Urgency: elective      General Information and Staff    Patient location during procedure: OR  Anesthesiologist: Mike Levin MD  Resident/CRNA: Magda Sanchez CRNA  Performed: CRNA   Performed by: Magda Sanchez CRNA  Authorized by: Mike Levin MD      Indications and Patient Condition  Indications for airway management: anesthesia  Sedation level: deep  Preoxygenated: yes  Patient position: sniffing  Mask difficulty assessment: 1 - vent by mask    Final Airway Details  Final airway type: supraglottic airway      Successful airway: classic  Size 3       Number of attempts at approach: 1  Number of other approaches attempted: 0

## 2024-07-16 NOTE — ANESTHESIA POSTPROCEDURE EVALUATION
[facility]    Kirstin Myers Patient Status:  Hospital Outpatient Surgery   Age/Gender 54 year old female MRN [id number]   Location [facility] SURGERY Attending Kip Ann MD   Hosp Day # 0 PCP None Pcp       Anesthesia Post-op Note    CYSTOSCOPY, LEFT URETEROSCOPY, LASER LITHOTRIPSY, STENT EXCHANGE    Procedure Summary       Date: 07/16/24 Room / Location:  MAIN OR  /  MAIN OR    Anesthesia Start: 0707 Anesthesia Stop: 0820    Procedure: CYSTOSCOPY, LEFT URETEROSCOPY, LASER LITHOTRIPSY, STENT EXCHANGE (Left: Ureter) Diagnosis:       Nephrolithiasis      (Nephrolithiasis [N20.0])    Surgeons: Kip Ann MD Anesthesiologist: Mike Levin MD    Anesthesia Type: general ASA Status: 3            Anesthesia Type: general    Vitals Value Taken Time   /72 07/16/24 0822   Temp 98.6 07/16/24 0822   Pulse 92 07/16/24 0822   Resp 12 07/16/24 0822   SpO2 93 07/16/24 0822       Patient Location: PACU    Anesthesia Type: general    Airway Patency: patent    Postop Pain Control: adequate    Mental Status: preanesthetic baseline    Nausea/Vomiting: none    Cardiopulmonary/Hydration status: stable euvolemic    Complications: no apparent anesthesia related complications    Postop vital signs: stable    Dental Exam: Unchanged from Preop    Patient to be discharged from PACU when criteria met.

## 2024-07-16 NOTE — OPERATIVE REPORT
Urology Operative Note    Attending Surgeon: Kip Ann MD    Assistant Surgeon: None    Patient Name: Kirstin Myers    Date of Surgery: 7/16/2024    Preoperative Diagnosis: Left nephrolithiasis    Postoperative Diagnosis: Same    Procedure Performed:   Cystoscopy, LEFT ureteroscopy, laser lithotripsy, basket stone extraction, retrograde pyelogram, ureteral stent exchange    Indication:  Patient is a 54 year old female who presented with an 8mm left lower pole residual stone fragment after PCNL. The patient was counseled on options, risks, and benefits and elected to undergo the above procedure. We discussed risks including, but not limited to, bleeding, infection, damage to surrounding structures, need for repeat procedure(s). The patient understood these risks and wished to proceed with surgery.    Findings:  Cystoscopy - normal urethra without strictures, normal bladder. Main stone in left lower pole fully treated as well as a few other debris pieces. Soft, crumbling stone.     Procedure:  The patient was taken to the operating room and a timeout was performed confirming the correct patient and procedure. The patient was prepped and draped in lithotomy position after undergoing general anesthesia. Pre-operative prophylactic antibiotics were given in the form of Ampicillin and Gentamicin.    The cystoscope was inserted per urethra and the bladder was inspected and drained. The left ureteral stent was grasped and pulled to the urethral meatus with a cystoscopic grasper. The stent was cannulated with a Sensor wire, and the wire was passed into the renal pelvis which I confirmed using fluoroscopy. The stent was fully removed.    A 12/14 Romanian ureteral access sheath was inserted over the first wire. It was advanced without resistance to the mid ureter. The inner cannula was removed and a second wire was inserted through the sheath and into the kidney, which was confirmed fluoroscopically.  The access sheath was  removed and the second safety wire was secured to the drape.  The access sheath was then reinserted over the first working wire up to the proximal ureter. The wire and inner cannula of the access sheath were removed, leaving the safety wire in place alongside the access sheath.     The flexible ureteroscope was advanced into the sheath and up into the renal pelvis. Stone(s) was/were seen in the lower pole of the kidney. The stone(s) was/were fragmented using a laser, and then the fragments were extracted using a zero tip basket. Any smaller remaining stone fragments were lasered to dust, and thoroughly irrigated. All renal calyces were surveyed once more, and no large fragments were seen. A retrograde pyelogram was performed through the scope and showed no extravasation. The ureter was inspected while slowly removing the scope and access sheath, and it appeared healthy without stone fragments seen.    A 6-Zimbabwean x 26 cm JJ ureteral stent was inserted over the remaining wire. The proximal coil was formed in the kidney under fluoroscopic guidance. The distal coil was formed within the bladder using the pusher and fluoroscopy. The stent string was removed prior to stent placement.    The bladder was drained and the cystoscope was removed. The patient was awoken from anesthesia and transferred to PACU in stable condition. The patient tolerated the procedure well. All instrument/supply counts were correct at the end of the case.    Specimens:   Stone fragments for chemical analysis    Estimated Blood Loss:  1 mL    Tubes/Drains:  6-Zimbabwean x 26 cm JJ left ureteral stent    Complications:   None immediate    Condition from OR:  Stable    Plan:   The patient will follow up in the office for stent removal in 1 week.      Kip Ann MD  Staff Urologist  [facility]  Office: [phone]

## 2024-07-16 NOTE — DISCHARGE INSTRUCTIONS
You had cystoscopy, ureteroscopy, and stent exchange in the operating room today.    Instructions:    - No heavy lifting or strenuous activity for 1 day. You may resume regular activity tomorrow.  With increased activity you may notice more blood in the urine from stent movement inside the bladder.    - Your follow-up appointment is listed below. Please contact us at [phone] if you need to change your appointment.    Your appointments       Date & Time Appointment Department (Center)    Jul 18, 2024 1:00 PM CDT Hospital Follow Up with Joya Garcia APRN [facility] ([facility])        Jul 23, 2024 11:00 AM CDT Procedure with Kip Ann MD [facility] ([place] [address])              [place] [address]  [address]  [facility] [id number] [phone] [facility]  [facility]  [address]  [facility] [id number] [phone]           - You have a stent (small plastic tube) inside your kidney and ureter to allow the swelling from surgery to resolve. This is only temporary and must be removed, so you should not forget about it. We will remove your stent via a brief cystoscopy in clinic when you return. If you have to miss this appointment for some reason please make sure you re-schedule it.     - With a ureteral stent in place you may have some discomfort on your side/flank. You can feel pain worsen when you urinate, so try to avoid holding urine and void every 2-3 hours. You also may notice increased blood in the urine as you increase your activity. If this happens increase your hydration and take it easy for a day. Warm baths/hot packs can help with the discomfort as well as your prescribed medications and Advil/Motrin (if you are able to take these).     - You may experience mild pain after the procedure for  a few days.  If the pain becomes intolerable please contact our office or go to the nearest Emergency Room or Urgent Care. You should take over the counter ibuprofen (AKA motrin, advil) for mild pain (provided you do not have a medical condition such as stomach ulcers or kidney disease which prohibits you from taking these). You may alternate this with tylenol as well. If pain is still not relieved by tylenol and/or ibuprofen, you may take narcotic pain medication if prescribed (typically oxycodone or tramadol). If you are taking narcotic pain medication this can make you constipated, so you should take over the counter stool softeners or miralax if prescribed.    - Warm packs over the lower abdomen or hot baths often help with discomfort after cystoscopy.    - If you take blood thinners (such as aspirin or plavix) please hold these medications until 3 days after surgery.     - You may experience burning and frequency of urination over the next few days. This will improve after a few days if you stay well hydrated.      - You are likely to see some blood in your urine (pink or light red urine) that should clear up within a few days. Staying well hydrated should help this clear up. If you notice the urine stays dark red or there are multiple large blood clots despite good hydration, please call the urology clinic ([phone]).     - Try to abstain from alcohol, coffee, tea, artificial sweeteners, and spicy food for the next 48 hours as these can irritate the bladder.     - If you develop fevers / chills, difficulty urinating, or abdominal pain that does not improve with pain medications, please call the office.     - Drink 1.5 to 2 liters of fluid today (water is preferable). If you are on a fluid restriction due to other medical reasons then you need to adhere to your fluid restriction recommendations.    Kip Ann MD  Staff Urologist  [facility]  Office: [phone]

## 2024-07-22 LAB
CARBONATE APATITE: 10 %
MG NH4 PO4 (STRUVITE): 90 %
WEIGHT-STONE: 5241 MG

## 2024-07-23 ENCOUNTER — PROCEDURE (OUTPATIENT)
Dept: SURGERY | Facility: CLINIC | Age: 54
End: 2024-07-23

## 2024-07-23 DIAGNOSIS — R82.90 URINE FINDING: Primary | ICD-10-CM

## 2024-07-23 DIAGNOSIS — N20.0 NEPHROLITHIASIS: ICD-10-CM

## 2024-07-23 LAB
APPEARANCE: CLEAR
BILIRUBIN: NEGATIVE
GLUCOSE (URINE DIPSTICK): NEGATIVE MG/DL
KETONES (URINE DIPSTICK): NEGATIVE MG/DL
MULTISTIX LOT#: ABNORMAL NUMERIC
NITRITE, URINE: NEGATIVE
PH, URINE: 6.5 (ref 4.5–8)
PROTEIN (URINE DIPSTICK): 30 MG/DL
SPECIFIC GRAVITY: 1.02 (ref 1–1.03)
URINE-COLOR: YELLOW
UROBILINOGEN,SEMI-QN: 0.2 MG/DL (ref 0–1.9)

## 2024-07-23 PROCEDURE — 52310 CYSTOSCOPY AND TREATMENT: CPT | Performed by: SURGERY

## 2024-07-23 PROCEDURE — 81003 URINALYSIS AUTO W/O SCOPE: CPT | Performed by: SURGERY

## 2024-07-23 RX ORDER — CIPROFLOXACIN 500 MG/1
500 TABLET, FILM COATED ORAL ONCE
Status: COMPLETED | OUTPATIENT
Start: 2024-07-23 | End: 2024-07-23

## 2024-07-23 RX ADMIN — CIPROFLOXACIN 500 MG: 500 TABLET, FILM COATED ORAL at 11:18:00

## 2024-07-23 NOTE — PROCEDURES
Clinic Procedure Note    INDICATIONS:   Kirstin Myers is a 54 year old female with history of diabetes who was seen by SAIDA Wynn on 2/20/2024 for nephrolithiasis.  She presented with left flank pain and signs of infection.  CT 1/22/2024 shows moderate to severe hydronephrosis related to a large obstructing partial staghorn UPJ stone measuring 4.3 cm, as well as a 7 mm lower pole stone.  No right renal stones present.  She did have a stent placed by Dr. Pena on 1/22/2024.  She was unable to follow-up with [facility] due to insurance.     Repeat CT scan showed complete resolution of hydronephrosis, less parenchymal thinning than prior.  MAG3 renal scan on 3/22/2024 showed 25% function of the left kidney and 75% function of the right kidney.  Post-Lasix left T1/2 was 26 minutes on the left.  No given decent function of the left kidney I discussed her options again of PCNL versus nephrectomy.  I do think it is worth saving his kidney and perform PCNL given 25% function.  The obstruction noted on renal scan could be related to the large stone burden.     Scheduled for PCNL on 5/22/24.  I brought her to the OR on 4/15/2024 for stent exchange.  Her stent was significantly encrusted and needed to use a stone  to treat a large stone within the bladder around the distal coil of the stent.  She was scheduled for PCNL a few weeks after that, but needed to cancel due to upper respiratory infection.     I brought her to the OR on 7/10/2024 for cystolitholapaxy of large distal stent coil stone, bladder fulguration, left PCNL.  She did well and was discharged on postoperative day 1.  Postoperative CT shows an 8 mm left lower pole stone fragment so I brought her back to the OR on 7/16/2024 for cystoscopy, left ureteroscopy, laser lithotripsy, stent exchange.    PROCEDURE:       1. Flexible cystoscopy, removal of left ureteral stent (96064)    DATE OF PROCEDURE: 7/23/2024     PRE-PROCEDURE DIAGNOSIS:  Nephrolithiasis    POST-PROCEDURE DIAGNOSIS: Same     SURGEON: Kip Ann MD    FINDINGS:  Stent successfully removed in its entirety.     PROCEDURE:   Patient was brought to the procedure suite and a time-out was performed identifiying the patient,  and procedure to be performed. The risks and benefits of the procedure were once again discussed with the patient including bleeding, infection, and dysuria. The patient agreed to proceed. The patient did not have any signs or symptoms of active UTI. Prophylactic PO antibiotics were given in clinic.    The patient was placed in supine position on the table and the genital area was prepped and draped in the standard sterile fashion. Urojet was used prior for local anesthesia effect. A flexible cystoscope was inserted per urethra. There were no urethral strictures present. The bladder was entered and the distal coil of the stent was seen protruding from the ureteral orifice. The stent was grasped with the flexible stent grasper, and then the stent and cystoscope were both removed. The stent was visualized outside the body and confirmed to be intact and fully removed.     There were no complications and the patient tolerated the procedure well.    IMPRESSION:  Successful stent removal after ureteroscopy today.    PLAN:   -Renal/bladder ultrasound in 6 weeks to ensure no silent hydronephrosis  -24 hour urine study for metabolic workup    Kip Ann MD  Staff Urologist  [facility]  Office: [phone]

## 2024-07-26 LAB
MG NH4 PO4 (STRUVITE): 100 %
WEIGHT-STONE: 33 MG

## 2024-07-31 ENCOUNTER — OFFICE VISIT (OUTPATIENT)
Dept: INTERNAL MEDICINE CLINIC | Facility: CLINIC | Age: 54
End: 2024-07-31
Payer: MEDICAID

## 2024-07-31 VITALS
BODY MASS INDEX: 42.61 KG/M2 | OXYGEN SATURATION: 97 % | SYSTOLIC BLOOD PRESSURE: 132 MMHG | RESPIRATION RATE: 14 BRPM | HEART RATE: 76 BPM | DIASTOLIC BLOOD PRESSURE: 84 MMHG | WEIGHT: 287.69 LBS | HEIGHT: 69 IN | TEMPERATURE: 98 F

## 2024-07-31 DIAGNOSIS — E11.9 TYPE 2 DIABETES MELLITUS WITHOUT COMPLICATION, WITHOUT LONG-TERM CURRENT USE OF INSULIN (HCC): ICD-10-CM

## 2024-07-31 DIAGNOSIS — N13.2 HYDRONEPHROSIS WITH URINARY OBSTRUCTION DUE TO RENAL CALCULUS: Primary | ICD-10-CM

## 2024-07-31 DIAGNOSIS — N17.9 AKI (ACUTE KIDNEY INJURY) (HCC): ICD-10-CM

## 2024-07-31 DIAGNOSIS — N21.0 BLADDER STONE: ICD-10-CM

## 2024-07-31 DIAGNOSIS — N20.0 NEPHROLITHIASIS: ICD-10-CM

## 2024-07-31 LAB
EST. AVERAGE GLUCOSE BLD GHB EST-MCNC: 140 MG/DL (ref 68–126)
HBA1C MFR BLD: 6.5 % (ref ?–5.7)

## 2024-07-31 PROCEDURE — 83036 HEMOGLOBIN GLYCOSYLATED A1C: CPT | Performed by: NURSE PRACTITIONER

## 2024-07-31 PROCEDURE — 99214 OFFICE O/P EST MOD 30 MIN: CPT | Performed by: NURSE PRACTITIONER

## 2024-07-31 RX ORDER — BLOOD SUGAR DIAGNOSTIC
STRIP MISCELLANEOUS
Qty: 50 STRIP | Refills: 0 | Status: SHIPPED | OUTPATIENT
Start: 2024-07-31 | End: 2024-08-01

## 2024-07-31 RX ORDER — BLOOD-GLUCOSE METER
1 EACH MISCELLANEOUS
Qty: 1 KIT | Refills: 0 | Status: SHIPPED | OUTPATIENT
Start: 2024-07-31 | End: 2024-08-01 | Stop reason: ALTCHOICE

## 2024-07-31 RX ORDER — LANCETS 33 GAUGE
1 EACH MISCELLANEOUS
Qty: 100 EACH | Refills: 0 | Status: SHIPPED | OUTPATIENT
Start: 2024-07-31 | End: 2024-08-01 | Stop reason: CLARIF

## 2024-07-31 NOTE — PROGRESS NOTES
TCM VISIT  [facility] TRANSITIONAL CARE CLINIC      HISTORY   CHIEF COMPLAINT: HFU-hydronephrosis with urinary obstruction due to renal calculus, nephrolithiasis/bladder stone, new onset DMII,, JORGE LUIS.     HPI: Kirstin Myers is a 54 year old female here today for hospital follow up for hydronephrosis with urinary obstruction due to renal calculus, nephrolithiasis/bladder stone, new onset DMII, JORGE LUIS.  Kirstin Myers was discharged from [facility]  to Home or Self Care.  Admit Date: 7/16/24. Discharge Date: 7/16/24.     Hospital Discharge Diagnosis:       Hydronephrosis with urinary obstruction due to renal calculus  Nephrolithiasis/bladder stone  New onset DMII    Hospital stay was uncomplicated.  Kirstin Myers was discharge with Bactrim and a referral to the [facility] for continued follow up.      Today, patient is being seen for hospital follow-up.  She reports doing okay since discharge.    She was admitted on 2/20 for nephrolithiasis.  She presented with left flank pain and signs of infection.  CT on 1/22 showed moderate to severe hydronephrosis related to a large obstructing partial staghorn UPJ stone measuring 4.3 cm as well as a 7 mm lower pole stone.  No right renal stones present.  She did have a stent placed by Dr. Pena on 1/22.  She was unable to follow-up with duly urology due to insurance.  Repeat CT scan showed complete resolution of hydronephrosis, less parenchymal thinning than prior, MAG 3 renal scan on 3/22 showed 25% function of the left kidney and 75% function of the right kidney.  Post-Lasix left T 1/2 was 26 minutes on the left.  Given decent function of the left kidney her options were again discussed-PCNL versus nephrectomy.  Per notes urology felt it was worth saving the kidney and performing PCNL given 25% function.  The obstruction noted on renal scan could be related to the large stone burden.  She was scheduled for PCNL on 5/22 and was brought to the OR on 4/15 for stent exchange.   Her stent was significantly encrusted and needed to use a stone pressure to treat a large stone within the bladder around the distal coil of the stent.  She had a PCNL scheduled a few weeks after but needed to cancel due to a upper respiratory infection.  She was finally brought to the OR on 7/10 for cystolitholapaxy of large distal stent coil stone, bladder fulguration, left PCNL.  She did well and was cleared for discharge on postop day 1.  Postoperative CT showed an 8 mm left pole stone fragment and returned for definitive stone treatment with ureteroscopy on 7/16.  She tolerated procedure well and was cleared for discharge and discharged home in stable condition.  She was noted in January to have an A1c of 7.6% and was not discussed with patient so she was unaware of new onset DMII.  She was discharged home in stable condition and follows up with [facility] today for management of new onset DMII as well as follow-up from PCNL.    Interactive contact within 2 business days post discharge first initiated on Date: 7/31/2024      Allergies:  No Known Allergies   Current Meds:  Current Outpatient Medications   Medication Sig Dispense Refill    traMADol 50 MG Oral Tab Take 1 tablet (50 mg total) by mouth every 6 (six) hours as needed for Pain. 15 tablet 0    oxybutynin ER 10 MG Oral Tablet 24 Hr Take 1 tablet (10 mg total) by mouth daily. 30 tablet 3    cholecalciferol 50 MCG (2000 UT) Oral Cap Take 1 capsule (2,000 Units total) by mouth daily.      NON FORMULARY Take by mouth daily. Beet root juice      acetaminophen 325 MG Oral Tab Take 1 tablet (325 mg total) by mouth every 6 (six) hours as needed for Pain.      cetirizine 10 MG Oral Tab Take 1 tablet (10 mg total) by mouth daily.      Fluticasone Propionate 50 MCG/ACT Nasal Suspension 1 spray by Each Nare route daily.      B Complex Vitamins (B COMPLEX OR) Take 1 tablet by mouth daily.      Bromelains (BROMELAIN OR) Take 1 tablet by mouth daily.      CHROMIUM OR Take 1  tablet by mouth daily.      Coenzyme Q10 (COQ10 OR) Take 1 tablet by mouth daily.      COLLAGEN OR Take 1 tablet by mouth daily.      Multiple Vitamins-Minerals (MULTIVITAMIN ADULT OR) Take 1 tablet by mouth daily.      LYSINE OR Take 1 tablet by mouth daily.      Green Tea, Camillia sinensis, (GREEN TEA OR) Take 1 tablet by mouth daily.      Omega-3 Fatty Acids (OMEGA-3 FISH OIL OR) Take 1 capsule by mouth daily.      Probiotic Product (PROBIOTIC DAILY OR) Take 1 capsule by mouth daily.       No current facility-administered medications for this visit.       HISTORY: reconciled and reviewed with patient  Past Medical History:    Calculus of kidney    Prediabetes    Visual impairment    GLASSES      Past Surgical History:   Procedure Laterality Date          Cystoscopy,insert ureteral stent      7/10/24 LEFT percutaneous nephrolithotomy    Hernia surgery            Other surgical history  2024    kidney stone    Other surgical history  04/15/2024    stent exchange    Other surgical history  07/10/2024    NEPHROSTOMY TUBE IN  PLACE    Repair ing hernia,5+y/o,reducibl      Ventral hernia repair N/A 2016    Procedure: HERNIA VENTRAL REPAIR;  Surgeon: Vijay Randall MD;  Location:  MAIN OR      Family History   Problem Relation Age of Onset    Cancer Mother     Heart Disease Maternal Grandfather       Social History     Socioeconomic History    Marital status:    Tobacco Use    Smoking status: Every Day     Current packs/day: 1.00     Average packs/day: 1 pack/day for 30.0 years (30.0 ttl pk-yrs)     Types: Cigarettes     Passive exposure: Current   Vaping Use    Vaping status: Former   Substance and Sexual Activity    Alcohol use: No     Alcohol/week: 0.0 standard drinks of alcohol    Drug use: No     Social Determinants of Health     Food Insecurity: No Food Insecurity (7/10/2024)    Food Insecurity     Food Insecurity: Never true   Transportation Needs: No Transportation Needs  (7/10/2024)    Transportation Needs     Lack of Transportation: No   Housing Stability: Low Risk  (7/10/2024)    Housing Stability     Housing Instability: No        Imaging & Consults:        Lab Results   Component Value Date/Time    WBC 14.8 (H) 07/11/2024 05:49 AM    HGB 14.0 07/11/2024 05:49 AM    HCT 44.4 07/11/2024 05:49 AM    .0 07/11/2024 05:49 AM     (H) 07/11/2024 05:49 AM     07/11/2024 05:49 AM    K 4.6 07/11/2024 05:49 AM     07/11/2024 05:49 AM    CO2 28.0 07/11/2024 05:49 AM    BUN 14 07/11/2024 05:49 AM    CREATSERUM 1.18 (H) 07/11/2024 05:49 AM    CA 8.8 07/11/2024 05:49 AM    ALB 4.1 04/11/2024 03:24 PM    ALT 14 04/11/2024 03:24 PM    AST 15 04/11/2024 03:24 PM    INR 0.9 07/05/2024 02:49 PM    A1C 6.5 (H) 07/31/2024 02:37 PM         There is no immunization history on file for this patient.    ROS:  GENERAL: energy stable, denies fever, weakness  SKIN: denies any skin changes  EYES: denies blurred vision, eye pain  HEENT: denies ear pain, loss of hearing, sore throat  RESPIRATORY: + chronic non productive cough, denies dyspnea with exertion  CARDIOVASCULAR: denies syncope, chest pain, fatigue, palpitations   GI: denies abdominal pain, melena, constipation, diarrhea, nausea, vomiting   : + right flank aching, + urinary incontinence  MUSCULOSKELETAL: denies pain, states normal range of motion of extremities  NEUROLOGIC: denies confusion, balance difficulty  PSYCHIATRIC: denies depression or anxiety  HEMATOLOGIC: denies history of anemia, bruising, bleeding    PHYSICAL EXAM:  Vitals:    07/31/24 1459   BP: 132/84   Pulse: 76   Resp: 14   Temp: 98.2 °F (36.8 °C)       GENERAL: well developed, well nourished, in no apparent distress, good historian  INTEGUMENTARY: warm, dry, no rashes  HEENT: atraumatic, normocephalic, sclera white, conjunctivae pink  NECK: supple  CHEST: no chest tenderness  LUNGS: clear to auscultation bilaterally, no wheezes, rhonchi or rales, normal  respiratory effort  CARDIO: S1, S2, RRR without audible murmur  GI: + BS to all quadrants, no tenderness  MUSCULOSKELETAL: + ROM in all joints, no evidence of swelling, pain   EXTREMITIES: no cyanosis, or edema  NEURO: Oriented x3  PSYCHIATRIC: appropriate affect    ASSESSMENT/ PLAN:   1. Hydronephrosis with urinary obstruction due to renal calculus/nephrolithiasis/bladder stone  Has had large obstructing partial staghorn UPJ stone since 1/24 with multiple imaging and procedures  Underwent cystolitholapaxy of large distal stent coil stone, bladder fulguration, left PCNL on 7/10  Did well postoperatively and was discharged  Postoperative CT showed an 8 mm left lower pole stone fragment and returned for definitive stone treatment with ureteroscopy on 7/16  Reports some ongoing right flank aching type discomfort  Denies urinary frequency/improved urgency  Has urinary incontinence  Denies cloudy urine/burning with urination  Denies any blood in urine  Had nephrostomy tube that was internalized  Saw urology on 7/23 and had stent removed  Completed:  Bactrim -160 mg 1 tab 2 times daily x 2 days  Tolerating an oral diet  Appetite is good/drinking well  Moving bowels/passing gas  Stopped her oxybutynin as urinary urgency had improved  Not currently taking any medication for pain  Follow-up with Dr. Oseguera on 9/3 at 1:45 PM  Needs to establish with PCP-list of providers in [place] network given to patient to establish in 4 weeks  All hospital records, labs, radiology reviewed from current hospitalization    2. Type 2 diabetes mellitus without complication, without long-term current use of insulin (HCC)  Had A1c in January that was 7.6%-new onset DMII  Patient reports having made some dietary changes since then  Was not discussed with patient about new onset DMII and was not started on any medication and was currently not checking BS  Discussed since A1c is greater than 3 months old will need to repeat A1c today  Ordered  Hemoglobin [id number]  A1c improved to 6.5%  Patient notified by [employer] message of results  Discussed patient is still at diabetic level but has much improved from January  Rx sent for:  Blood Glucose Monitoring Suppl (ONETOUCH VERIO FLEX SYSTEM) w/Device Does not apply Kit; 1 kit every morning before breakfast. Test blood sugar daily (before breakfast or before dinner)  Dispense: 1 kit; Refill: 0  Glucose Blood (ONETOUCH VERIO) In Vitro Strip; Test blood sugar daily (before breakfast or before dinner)  Dispense: 50 strip; Refill: 0  Lancets (ONETOUCH DELICA PLUS [id number]) Does not apply Misc; 1 Lancet daily. Test blood sugar daily (before breakfast or before dinner)  Dispense: 100 each; Refill: 0  Patient is to start checking BS 2 times daily and morning fasting and before dinner and keep a log and bring with to all follow-ups for review  Discussed diet, exercise, lifestyle modifications to try and bring A1c down to non diabetic numbers  Discussed no need to start medication at this time as we will provide 3-6 months to try diet, exercise, lifestyle modifications to see if improvement in A1c  Discussed if no improvement in A1c in 3-6 months may require medication  Discussed S/S of increasing BS-polyuria, polyphasia, polydipsia, and unintentional weight loss  Order placed for EDUCATION -OP REFERRAL  DIABETES- FULL ED 10 HRS GRP/IND  DM education scheduled on 8/6 and recommended completing all 6 steps  Goals discussed:  A1c of 6.7-7.0%  Fasting BS of   Premeal  or less  2 hours postprandial  or less  Further A1c monitoring per PCP  Continue to monitor for any S/S of hyper hypoglycemia    3. JORGE LUIS (acute kidney injury) (HCC)  Noted with mild JORGE LUIS at time of admission  BUN 16  Creatinine 1.35  GFR 46  Treated with IV hydration  Kidney function improved on 7/11  BUN 14  Creatinine 1.18  GFR 54  Will need further monitoring of labs to ensure she does not have CKD 3  Continue to monitor labs as  directed      Orders Placed This Encounter    Hemoglobin A1C [E]     Standing Status:   Future     Number of Occurrences:   1     Standing Expiration Date:   2025     Order Specific Question:   Release to patient     Answer:   Immediate    Multiple Vitamins-Minerals (CENTRUM SILVER 50+WOMEN) Oral Tab     Sig: Take 1 tablet by mouth daily.    NON FORMULARY     Sig: Take 1 tablet by mouth daily. Product: Pure Synergy    CINNAMON OR     Sig: Take 1 tablet by mouth daily.    NON FORMULARY     Sig: Take by mouth daily. Product: Ana powder    DISCONTD: Blood Glucose Monitoring Suppl (ONETOUCH VERIO) w/Device Does not apply Kit     Si kit every morning before breakfast. Test blood sugar before breakfast daily     Dispense:  1 kit     Refill:  0    DISCONTD: Glucose Blood (ONETOUCH VERIO) In Vitro Strip     Sig: Test blood sugar every morning     Dispense:  50 strip     Refill:  0    DISCONTD: Lancets (ONETOUCH DELICA PLUS [id number]) Does not apply Misc     Si strip every morning before breakfast. Test blood sugar before breakfast daily     Dispense:  100 each     Refill:  0    DISCONTD: Blood Glucose Monitoring Suppl (TRUE METRIX METER) w/Device Does not apply Kit     Si kit every morning before breakfast. Test blood sugar every morning (May substitute any meter and supplies covered by insurance)     Dispense:  1 kit     Refill:  0     Please cancel the One Touch meter and supplies sent yesterday    DISCONTD: Glucose Blood (TRUE METRIX BLOOD GLUCOSE TEST) In Vitro Strip     Sig: Test blood sugar every morning (May substitute any meter and supplies covered by insurance)     Dispense:  50 strip     Refill:  0     Please cancel the One Touch meter and supplies sent yesterday    DISCONTD: TRUEplus Lancets 33G Does not apply Misc     Sig: Test blood sugar every morning (May substitute any meter and supplies covered by insurance)     Dispense:  100 each     Refill:  0     Please cancel the One Touch meter  and supplies sent yesterday    Blood Glucose Monitoring Suppl (ONETOUCH VERIO FLEX SYSTEM) w/Device Does not apply Kit     Si kit every morning before breakfast. Test blood sugar daily (before breakfast or before dinner)     Dispense:  1 kit     Refill:  0     Spoke with Shafer, the One Touch Verio Flex i preferred - please cancel True Metrix meter/supplies.  Shafer will call with correct NDC number.  Does not require a PA.    Glucose Blood (ONETOUCH VERIO) In Vitro Strip     Sig: Test blood sugar daily (before breakfast or before dinner)     Dispense:  50 strip     Refill:  0     Spoke with Shafer, the One Touch Verio Flex is preferred - please cancel True Metrix meter/supplies.  Shafer will call with correct NDC number.  Does not require a PA.    Lancets (ONETOUCH DELICA PLUS [id number]) Does not apply Misc     Si Lancet daily. Test blood sugar daily (before breakfast or before dinner)     Dispense:  100 each     Refill:  0     Spoke with Shafer, the One Touch Verio Flex i preferred - please cancel True Metrix meter/supplies.  Shafer will call with correct NDC number.  Does not require a PA.           Medications & Refills for this Visit:   Blood Glucose Monitoring Suppl (ONETOUCH VERIO FLEX SYSTEM) w/Device Does not apply Kit 1 kit every morning before breakfast. Test blood sugar daily (before breakfast or before dinner) 1 kit 0    Glucose Blood (ONETOUCH VERIO) In Vitro Strip Test blood sugar daily (before breakfast or before dinner) 50 strip 0    Lancets (ONETOUCH DELICA PLUS [id number]) Does not apply Misc 1 Lancet daily. Test blood sugar daily (before breakfast or before dinner) 100 each 0    Multiple Vitamins-Minerals (CENTRUM SILVER 50+WOMEN) Oral Tab Take 1 tablet by mouth daily.      NON FORMULARY Take 1 tablet by mouth daily. Product: Pure Synergy      CINNAMON OR Take 1 tablet by mouth daily.      NON FORMULARY Take by mouth daily. Product: Ana powder      cholecalciferol 50 MCG ( UT)  Oral Cap Take 1 capsule (2,000 Units total) by mouth daily.      NON FORMULARY Take by mouth daily. Beet root powder      acetaminophen 325 MG Oral Tab Take 1-2 tablets (325-650 mg total) by mouth every 6 (six) hours as needed for Pain.      Fluticasone Propionate 50 MCG/ACT Nasal Suspension 1 spray by Each Nare route daily as needed for Rhinitis or Allergies.      B Complex Vitamins (B COMPLEX OR) Take 1 tablet by mouth daily.      Bromelains (BROMELAIN OR) Take 1 tablet by mouth daily.      COLLAGEN OR Take 1 tablet by mouth daily.      Green Tea, Camillia sinensis, (GREEN TEA OR) Take 1 tablet by mouth daily.      Omega-3 Fatty Acids (OMEGA-3 FISH OIL OR) Take 1 capsule by mouth daily.      Probiotic Product (PROBIOTIC DAILY OR) Take 1 capsule by mouth daily.       Requested Prescriptions     Signed Prescriptions Disp Refills    Blood Glucose Monitoring Suppl (ONETOUCH VERIO FLEX SYSTEM) w/Device Does not apply Kit 1 kit 0     Si kit every morning before breakfast. Test blood sugar daily (before breakfast or before dinner)    Glucose Blood (ONETOUCH VERIO) In Vitro Strip 50 strip 0     Sig: Test blood sugar daily (before breakfast or before dinner)    Lancets (ONETOUCH DELICA PLUS [id number]) Does not apply Misc 100 each 0     Si Lancet daily. Test blood sugar daily (before breakfast or before dinner)         Health Maintenance:  Health Maintenance   Topic Date Due    Annual Physical  Never done    Colorectal Cancer Screening  Never done    Mammogram  Never done    Pneumococcal Vaccine: Birth to 64yrs (1 of 2 - PCV) Never done    DTaP,Tdap,and Td Vaccines (1 - Tdap) Never done    Pap Smear  Never done    Zoster Vaccines (1 of 2) Never done    Lung Cancer Screening  Never done    COVID-19 Vaccine (1 -  season) Never done    Tobacco Cessation Counseling  Never done    Influenza Vaccine (1) 10/01/2024    Annual Depression Screening  Completed       Chronic Care Management Referral:  N/A      Transitional Care Management Certification:  During the visit, the following was completed:  Obtained and reviewed discharge summary, continuity of care documents, urology notes, and discharge information   Reviewed Labs (CBC, CMP), Labs (Pathology), Labs (Microbiology), CT radiology results, X-Ray radiology results, nephrostomy tube placement, fluoroscopy, telemetry, Last A1c value was 6.5% done 7/31/2024., need for follow-up on pending tests, need for follow-up on diagnostic tests, and need for follow-up on treatments    Medication Reconciliation:  I am aware of an inpatient discharge within the last 30 days.  The discharge medication list has been reconciled with the patient's current medication list and reviewed by me.  See medication list for additions of new medication, and changes to current doses of medications and discontinued medications.        Discharge Disposition/Plan:     Future Appointments   Date Time Provider Department Center   8/20/2024  5:30 PM [facility]   8/22/2024 10:30 AM WDR LAB [facility]   8/23/2024  2:15 PM PF US RM3 PF US [place]   9/3/2024  1:45 PM Kip Ann MD [id number]   9/3/2024  5:30 PM [facility] [facility]   9/17/2024  5:30 PM [facility] [facility]   10/1/2024  5:30 PM [facility] [facility]      1.  Transitional Care Clinic Visit: Next visit Discharged  2.  PCP Visit: List of providers in [place] network provided to patient to establish  3.  Chronic Care Management: N/A     SHARI Soto, 7/31/2024  [facility]  [phone]

## 2024-07-31 NOTE — PROGRESS NOTES
TRANSITIONAL CARE CLINIC PHARMACIST MEDICATION RECONCILIATION        Kirstin Myers MRN [id number]    3/24/1970 PCP None Pcp       Comments: Medication history completed by the Transitional Care Clinic Pharmacist with the patient in the office.     The following medication changes occurred while patient was hospitalized:  Medications Changed:  Sulfamethoxazole-Trimethoprim -160mg tabs - 1 tablet two times daily (therapy completed on 24)    Outpatient Encounter Medications as of 2024   Medication Sig    Multiple Vitamins-Minerals (CENTRUM SILVER 50+WOMEN) Oral Tab Take 1 tablet by mouth daily.    NON FORMULARY Take 1 tablet by mouth daily. Product: Pure Synergy    CINNAMON OR Take 1 tablet by mouth daily.    NON FORMULARY Take by mouth daily. Product: Ka'Donis powder    cholecalciferol 50 MCG (2000 UT) Oral Cap Take 1 capsule (2,000 Units total) by mouth daily.    NON FORMULARY Take by mouth daily. Beet root powder    acetaminophen 325 MG Oral Tab Take 1-2 tablets (325-650 mg total) by mouth every 6 (six) hours as needed for Pain.    Fluticasone Propionate 50 MCG/ACT Nasal Suspension 1 spray by Each Nare route daily as needed for Rhinitis or Allergies.    B Complex Vitamins (B COMPLEX OR) Take 1 tablet by mouth daily.    Bromelains (BROMELAIN OR) Take 1 tablet by mouth daily.    COLLAGEN OR Take 1 tablet by mouth daily.    Green Tea, Camillia sinensis, (GREEN TEA OR) Take 1 tablet by mouth daily.    Omega-3 Fatty Acids (OMEGA-3 FISH OIL OR) Take 1 capsule by mouth daily.    Probiotic Product (PROBIOTIC DAILY OR) Take 1 capsule by mouth daily.     Medication Adherence Assessment:   Patient reports finishing the antibiotic as prescribed.  States she is doing much better after having the stent removed a few days ago.  Says she has not taken the Tramadol since the first couple of days out of the hospital.  Reports she will take Acetaminophen as needed for pain or fever.    Reviewed the supplement  list with the patient.  Recommended she not take the Lysine tablets since the amino acid can increase calcium absorption, which can potentially lead to kidney stones.  Patient states she will not take the Lysine any more.  Patient states she stopped taking the Oxybutynin tablets because she does not feel like she needs the medication any longer.  Updated medication list with current medications and supplements.    Discussed with the patient her elevated A1c back in January.  Patient states that there was a nurse that mentioned the level to her and she has been trying to watch her diet since then.  Explained that the A1c of 7.6 is indicative of diabetes, but that the number is now outdated.  Discussed with APRN and we will draw a new level today.  Per APRN, patient to start checking her blood sugar.  Pended order for glucometer and supplies to test daily for now until A1c lab returns.    Reviewed all medications in detail with patient including dose, indication, timing of administration, monitoring parameters, and potential side effects of medications.   Patient confirmed understanding.     Thank you,    Glo Alfonso, PharmD, 7/31/2024, 2:22 PM  Transitional Care Clinic

## 2024-08-01 RX ORDER — CALCIUM CITRATE/VITAMIN D3 200MG-6.25
TABLET ORAL
Qty: 50 STRIP | Refills: 0 | Status: SHIPPED | OUTPATIENT
Start: 2024-08-01 | End: 2024-08-02 | Stop reason: CLARIF

## 2024-08-01 RX ORDER — GLUCOSAM/CHON-MSM1/C/MANG/BOSW 500-416.6
TABLET ORAL
Qty: 100 EACH | Refills: 0 | Status: SHIPPED | OUTPATIENT
Start: 2024-08-01 | End: 2024-08-02 | Stop reason: CLARIF

## 2024-08-02 PROBLEM — E11.9 TYPE 2 DIABETES MELLITUS WITHOUT COMPLICATION, WITHOUT LONG-TERM CURRENT USE OF INSULIN (HCC): Status: ACTIVE | Noted: 2024-08-02

## 2024-08-02 RX ORDER — LANCETS 33 GAUGE
1 EACH MISCELLANEOUS DAILY
Qty: 100 EACH | Refills: 0 | Status: SHIPPED | OUTPATIENT
Start: 2024-08-02

## 2024-08-02 RX ORDER — BLOOD-GLUCOSE METER
1 EACH MISCELLANEOUS
Qty: 1 KIT | Refills: 0 | Status: SHIPPED | OUTPATIENT
Start: 2024-08-02

## 2024-08-02 RX ORDER — BLOOD SUGAR DIAGNOSTIC
STRIP MISCELLANEOUS
Qty: 50 STRIP | Refills: 0 | Status: SHIPPED | OUTPATIENT
Start: 2024-08-02

## 2024-08-06 ENCOUNTER — NURSE ONLY (OUTPATIENT)
Dept: ENDOCRINOLOGY CLINIC | Facility: CLINIC | Age: 54
End: 2024-08-06
Payer: MEDICAID

## 2024-08-06 VITALS — BODY MASS INDEX: 43 KG/M2 | WEIGHT: 292.81 LBS

## 2024-08-06 DIAGNOSIS — E11.9 TYPE 2 DIABETES MELLITUS WITHOUT COMPLICATION, WITHOUT LONG-TERM CURRENT USE OF INSULIN (HCC): Primary | ICD-10-CM

## 2024-08-06 PROCEDURE — G0108 DIAB MANAGE TRN  PER INDIV: HCPCS | Performed by: DIETITIAN, REGISTERED

## 2024-08-07 NOTE — PROGRESS NOTES
Kirstin Myers  : 3/24/1970 attended Step 1 Diabetic Education:    Date: 2024  Referring Provider: MELANIA Feliz  Start time: 1pm End time: 1:30pm Pt. was 30 min late for appt.    Wt 292 lb 12.8 oz   LMP 2023 (Approximate)   BMI 43.24 kg/m²     HgbA1C (%)   Date Value   2024 6.5 (H)        Diabetes Overview, pathophysiology, A1C results and treatment options for diabetes self-management:   Described basic process and treatment options for diabetes self-management.    Healthy Eating:  Obtained usual diet history: eats foods that are organic  Instructed on balanced plate method including what is carbohydrate, limit starch to 1/4 of plate, protein 1/4 of plate, non-starchy vegetables 1/2 of plate and modest portions of fruit, yogurt, milk if desired.    Monitoring: Instructed/reinforced blood glucose monitoring, testing schedules and target goals:   Fasting / Pre-meal  2 Hour Post-prandial 140-180  Demonstrated ability to perform blood glucose testing on: Onetouch Verio Flex meter  Glucose today was 120 mg/dl    Problem Solving: Prevention,detection and treatment of acute complications: taught symptoms of hypoglycemia, hyperglycemia, how to treat low blood sugar (Rule of 15) and actions for lowering high blood glucose levels.    Recommendations:    Start monitoring blood glucose and attend Step 2 Class.    Written materials provided for all areas covered.    Patient verbalized understanding and has no further questions at this time    Sharon Hoyt RN, [facility]

## 2024-08-11 PROBLEM — N17.9 AKI (ACUTE KIDNEY INJURY): Status: ACTIVE | Noted: 2024-08-11

## 2024-08-11 PROBLEM — N17.9 AKI (ACUTE KIDNEY INJURY) (HCC): Status: ACTIVE | Noted: 2024-08-11

## 2024-08-20 ENCOUNTER — NURSE ONLY (OUTPATIENT)
Dept: ENDOCRINOLOGY CLINIC | Facility: CLINIC | Age: 54
End: 2024-08-20
Payer: MEDICAID

## 2024-08-20 DIAGNOSIS — E11.9 TYPE 2 DIABETES MELLITUS WITHOUT COMPLICATION, WITHOUT LONG-TERM CURRENT USE OF INSULIN (HCC): Primary | ICD-10-CM

## 2024-08-20 PROCEDURE — G0109 DIAB MANAGE TRN IND/GROUP: HCPCS

## 2024-08-21 NOTE — PROGRESS NOTES
Kirstin Myers 3/24/1970 attended: Step 2/Class 1 Group Diabetes Education  Date: 08/20/2024 Start: 5:30 PM  End: 6:30 PM    Diabetes Overview, pathophysiology, pre-diabetes, A1C results and treatment options for diabetes self-management, types of diabetes and risk factors.    Healthy Eating  Hypoglycemia signs. Symptoms and treatment using the Rule of 15 for treating hypoglycemia.  Apps helpful for smart phones as well as websites provided.    Being Active  Discussed exercise benefits and precautions including its effect on blood glucose levels.    Taking Medications  Discussed medication methods of action, side effects.    Monitoring  Benefits and options for glucose monitoring, target BG goals, HgbA1C values.    Problem Solving: Prevention, detection and treatment of acute complications  Discussed signs, symptoms and treatment of hypoglycemia and hyperglycemia.    Behavior Change  Goals set for nutrition and medications.    Recommendations:    Begin implementing healthy eating habits with portion control and attend Step 3 Class.  Written materials provided for all areas covered.

## 2024-08-22 ENCOUNTER — LAB ENCOUNTER (OUTPATIENT)
Dept: LAB | Age: 54
End: 2024-08-22
Attending: SURGERY
Payer: MEDICAID

## 2024-08-22 DIAGNOSIS — N20.0 NEPHROLITHIASIS: ICD-10-CM

## 2024-08-22 DIAGNOSIS — E11.9 TYPE 2 DIABETES MELLITUS WITHOUT COMPLICATION, WITHOUT LONG-TERM CURRENT USE OF INSULIN (HCC): ICD-10-CM

## 2024-08-22 PROCEDURE — 84392 ASSAY OF URINE SULFATE: CPT

## 2024-08-22 PROCEDURE — 83986 ASSAY PH BODY FLUID NOS: CPT

## 2024-08-22 PROCEDURE — 83945 ASSAY OF OXALATE: CPT

## 2024-08-22 PROCEDURE — 84105 ASSAY OF URINE PHOSPHORUS: CPT

## 2024-08-22 PROCEDURE — 82507 ASSAY OF CITRATE: CPT

## 2024-08-22 PROCEDURE — 84300 ASSAY OF URINE SODIUM: CPT

## 2024-08-22 PROCEDURE — 82340 ASSAY OF CALCIUM IN URINE: CPT

## 2024-08-22 PROCEDURE — 83735 ASSAY OF MAGNESIUM: CPT

## 2024-08-22 PROCEDURE — 84133 ASSAY OF URINE POTASSIUM: CPT

## 2024-08-22 PROCEDURE — 84560 ASSAY OF URINE/URIC ACID: CPT

## 2024-08-22 PROCEDURE — 81050 URINALYSIS VOLUME MEASURE: CPT

## 2024-08-22 PROCEDURE — 82436 ASSAY OF URINE CHLORIDE: CPT

## 2024-08-22 NOTE — TELEPHONE ENCOUNTER
Patient calling to request a refill on test strips as she currently doesn't have a pcp.     Requested Prescriptions     Pending Prescriptions Disp Refills    Glucose Blood (ONETOUCH VERIO) In Vitro Strip 200 strip 0     Sig: Test blood sugar daily (before breakfast or before dinner)     Future Appointments   Date Time Provider Department Center   8/23/2024  2:15 PM PF US RM3 PF [facility]   9/3/2024  1:45 PM Kip Ann MD [id number] [facility]   9/3/2024  5:30 PM [facility]   9/17/2024  5:30 PM [facility]   10/1/2024  5:30 PM [facility]     Last A1c value was 6.5% done 7/31/2024.  Last OV: 08/20/2024-step 2 class  Last refil: 08/02/2024: Transitional care

## 2024-08-23 ENCOUNTER — HOSPITAL ENCOUNTER (OUTPATIENT)
Dept: ULTRASOUND IMAGING | Age: 54
Discharge: HOME | End: 2024-08-23
Attending: SURGERY
Payer: MEDICAID

## 2024-08-23 DIAGNOSIS — N20.0 NEPHROLITHIASIS: ICD-10-CM

## 2024-08-23 PROCEDURE — 76770 US EXAM ABDO BACK WALL COMP: CPT | Performed by: SURGERY

## 2024-08-26 NOTE — PROGRESS NOTES
Renal ultrasound 8/23/2024 shows a possible 6 mm nonobstructing left lower pole renal stone, no hydronephrosis bilaterally.    Future Appointments  9/3/2024   1:45 PM    Kip Ann MD           [id number]           [facility]

## 2024-08-28 ENCOUNTER — TELEPHONE (OUTPATIENT)
Dept: SURGERY | Facility: CLINIC | Age: 54
End: 2024-08-28

## 2024-08-28 LAB
AMMONIA, URINE: 40 MMOL/24 HR
CHLORIDE URINE: 127 MMOL/24 HR
CITRIC ACID(CITRATE): 596 MG/24 HR
CREATININE, URINE: 1884 MG/24 HR
CREATININE/KG BODY WEIGHT, URINE: 14.2 MG/24 HR/KG
CREATININE/KG BODY WEIGHT, URINE: 14.2 MG/24 HR/KG
LC CALCIUM OXALATE SATURATION, URINE: 12.3
LC CALCIUM PHOSPHATE SATURATION, URINE: 0.96
LC CALCIUM, URINE: 356 MG/24 HR
LC CALCIUM/CREATININE RATIO, URINE: 189 MG/G CREAT
LC CALCIUM/KG BODY WEIGHT, URINE: 2.7 MG/24 HR/KG
MAGNESIUM, URINE: 175 MG/24 HR
OXALATES, URINE: 59 MG/24 HR
PH, 24 HR URINE: 5.67
PHOSPHORUS, URINE: 1314 MG/24 HR
POTASSIUM, URINE: 64 MMOL/24 HR
PROTEIN CATABOLIC RATE, URINE: 1 G/KG/24 HR
PROTEIN CATABOLIC RATE, URINE: 1 G/KG/24 HR
SODIUM, URINE: 137 MMOL/24 HR
SULFATE, URINE: 49 MEQ/24 HR
UREA NITROGEN, URINE: 17.77 G/24 HR
UREA NITROGEN, URINE: 17.77 G/24 HR
URIC ACID SATURATION, URINE: 1.74
URIC ACID SATURATION, URINE: 1.74
URIC ACID, URINE: 990 MG/24 HR
URINE VOLUME (PRESERVATIVE): 2050 ML/24 HR

## 2024-08-28 NOTE — TELEPHONE ENCOUNTER
24 Hour Metabolic Urine Study Results    Volume: 2050 mL/day  Goal >2500  Sodium: 137 mmol/day  Goal <150  Calcium: 356 mg/day  Goal <250  Citrate: 596 mg/day  Goal >450  Oxalate: 59 mg/day  Goal <40  pH: 5.665  Goal 5.8 - 6.2 (>6 for UA stones, >7 for cystine stone)  Creatinine: 1884 mg/day  Normal 18-20 mg/kilogram/day (female)  Normal 20-22 mg/kilogram/day (male)  Uric acid: 990 mg/day  Goal <750  Cystinuria present? No    Stone analysis: 100% struvite   Serum calcium: 8.8    Renal ultrasound 8/23/2024 shows a possible 6 mm nonobstructing left lower pole renal stone, no hydronephrosis bilaterally.     Future Appointments   Date Time Provider Department Center   9/3/2024  1:45 PM Kip Ann MD [id number] [facility]   9/3/2024  5:30 PM [facility]   9/17/2024  5:30 PM [facility]   10/1/2024  5:30 PM [facility]

## 2024-09-03 ENCOUNTER — NURSE ONLY (OUTPATIENT)
Dept: ENDOCRINOLOGY CLINIC | Facility: CLINIC | Age: 54
End: 2024-09-03
Payer: MEDICAID

## 2024-09-03 ENCOUNTER — TELEPHONE (OUTPATIENT)
Dept: ENDOCRINOLOGY CLINIC | Facility: CLINIC | Age: 54
End: 2024-09-03

## 2024-09-03 ENCOUNTER — OFFICE VISIT (OUTPATIENT)
Dept: SURGERY | Facility: CLINIC | Age: 54
End: 2024-09-03
Payer: MEDICAID

## 2024-09-03 DIAGNOSIS — E11.9 TYPE 2 DIABETES MELLITUS WITHOUT COMPLICATION, WITHOUT LONG-TERM CURRENT USE OF INSULIN (HCC): Primary | ICD-10-CM

## 2024-09-03 DIAGNOSIS — N20.0 NEPHROLITHIASIS: Primary | ICD-10-CM

## 2024-09-03 LAB
APPEARANCE: CLEAR
BILIRUBIN: NEGATIVE
GLUCOSE (URINE DIPSTICK): NEGATIVE MG/DL
KETONES (URINE DIPSTICK): NEGATIVE MG/DL
MULTISTIX LOT#: ABNORMAL NUMERIC
NITRITE, URINE: NEGATIVE
OCCULT BLOOD: NEGATIVE
PH, URINE: 7 (ref 4.5–8)
PROTEIN (URINE DIPSTICK): NEGATIVE MG/DL
SPECIFIC GRAVITY: 1.02 (ref 1–1.03)
URINE-COLOR: YELLOW
UROBILINOGEN,SEMI-QN: 1 MG/DL (ref 0–1.9)

## 2024-09-03 PROCEDURE — G0109 DIAB MANAGE TRN IND/GROUP: HCPCS

## 2024-09-03 RX ORDER — BLOOD SUGAR DIAGNOSTIC
STRIP MISCELLANEOUS
Qty: 200 STRIP | Refills: 3 | Status: SHIPPED | OUTPATIENT
Start: 2024-09-03 | End: 2025-09-03

## 2024-09-03 RX ORDER — LANCETS 30 GAUGE
1 EACH MISCELLANEOUS 2 TIMES DAILY
Qty: 200 EACH | Refills: 3 | Status: SHIPPED | OUTPATIENT
Start: 2024-09-03

## 2024-09-03 NOTE — PROGRESS NOTES
Kirstin Myers : 3/24/1970 attended Step 3 Group Diabetes Education Class: Food Groups, Carbohydrate Counting, Label Reading    9/3/2024   Referring Provider: Joya Garcia Start time: 5:30 PM End time: 6:30 PM    The patient participated during the class: Pt was able to identify sources of carbohydrates, read food labels for carb content of foods.       Wt Readings from Last 1 Encounters:   24 292 lb 12.8 oz        Healthy Eating:  Reviewed Basic Diet Guidelines as foundation of diabetes meal planning. Reinforced the balanced plate method. Taught nutrition basics defining carbohydrate, protein, and fat. Taught label reading, including an option to count carbohydrate grams or servings. Provided patient with goals for carbohydrate grams/choices for meals and snacks. Discussed sugar substitutes, alcohol and effect on blood glucose. Malia's helpful for smart phones, as well as websites for examining carbohydrate levels provided. Reviewed and reinforced macronutrient's, carbohydrate counting and meal planning.    Problem Solving:  Reinforced signs, symptoms, and treatment of hypoglycemia using the Rule of 15.  Importance of being aware of potential for a low blood sugar when consuming alcohol  Discussed impact of different food items and portion sizes on blood glucose levels.  Discussed blood glucose target of 180 mg/dL, if testing 2 hours post meal.    Monitoring:  Reviewed blood glucose records and importance of tracking foods/blood glucose levels.    Behavior Change:  Reviewed and updated individual goals and action plan set by patient.   Addressed barriers to tracking foods/blood glucose levels and following guidelines presented/achieving goals, and setting SMART goals as a group discussion.    Recommendations:  Follow individual meal plan recommended by Educator (MELVIN).  Continue implementing individual goals.   Attend remaining class sessions.  Begin implementing carbohydrate counting and continue  dietary and blood glucose tracking.     Written materials provided for all areas covered.    Patient verbalized understanding and has no further questions currently.      HILARIO MclainN-RN, [facility]

## 2024-09-03 NOTE — PROGRESS NOTES
Urology Clinic Note    Primary Care Provider:  Pcp, None     Chief Complaint:   Nephrolithiasis    HPI:   Kirstin Myers is a 54 year old female with history of diabetes who was seen for nephrolithiasis.  She presented with left flank pain and signs of infection.  CT 1/22/2024 shows moderate to severe hydronephrosis related to a large obstructing partial staghorn UPJ stone measuring 4.3 cm, as well as a 7 mm lower pole stone.  No right renal stones present.  She did have a stent placed by Dr. Pena on 1/22/2024.  She was unable to follow-up with [facility] due to insurance.     Repeat CT scan showed complete resolution of hydronephrosis, less parenchymal thinning than prior.  MAG3 renal scan on 3/22/2024 showed 25% function of the left kidney and 75% function of the right kidney.  Post-Lasix left T1/2 was 26 minutes on the left.  Given decent function of the left kidney I discussed her options again of PCNL versus nephrectomy.  I do think it is worth saving his kidney and perform PCNL given 25% function.  The obstruction noted on renal scan could be related to the large stone burden.     Scheduled for PCNL on 5/22/24.  I brought her to the OR on 4/15/2024 for stent exchange.  Her stent was significantly encrusted and needed to use a stone  to treat a large stone within the bladder around the distal coil of the stent.  She was scheduled for PCNL a few weeks after that, but needed to cancel due to upper respiratory infection.     I brought her to the OR on 7/10/2024 for cystolitholapaxy of large distal stent coil stone, bladder fulguration, left PCNL.  She did well and was discharged on postoperative day 1.  Postoperative CT shows an 8 mm left lower pole stone fragment so I brought her back to the OR on 7/16/2024 for cystoscopy, left ureteroscopy, laser lithotripsy, stent exchange.  Stent removed in clinic 7/23/2024.    Renal ultrasound 8/23/2024 shows a possible 6 mm nonobstructing left lower pole renal  stone, no hydronephrosis bilaterally.     24-hour urine study shows slightly high urine calcium, slightly high urine oxalate, slightly low urine pH, slightly high uric acid.    24 Hour Metabolic Urine Study Results     Volume: 2050 mL/day  Goal >2500  Sodium: 137 mmol/day  Goal <150  Calcium: 356 mg/day  Goal <250  Citrate: 596 mg/day  Goal >450  Oxalate: 59 mg/day  Goal <40  pH: 5.665  Goal 5.8 - 6.2 (>6 for UA stones, >7 for cystine stone)  Creatinine: 1884 mg/day  Normal 18-20 mg/kilogram/day (female)  Normal 20-22 mg/kilogram/day (male)  Uric acid: 990 mg/day  Goal <750  Cystinuria present? No     Stone analysis: 100% struvite   Serum calcium: 8.8    She is feeling well at this time with no abdominal or flank pain.    Urinalysis: pH 7.0, trace leukocyte esterase    History:     Past Medical History:    Calculus of kidney    Prediabetes    Visual impairment    GLASSES       Past Surgical History:   Procedure Laterality Date          Cystoscopy,insert ureteral stent      7/10/24 LEFT percutaneous nephrolithotomy    Hernia surgery            Other surgical history  2024    kidney stone    Other surgical history  04/15/2024    stent exchange    Other surgical history  07/10/2024    NEPHROSTOMY TUBE IN  PLACE    Repair ing hernia,5+y/o,reducibl      Ventral hernia repair N/A 2016    Procedure: HERNIA VENTRAL REPAIR;  Surgeon: Vijay Randall MD;  Location:  MAIN OR       Family History   Problem Relation Age of Onset    Cancer Mother     Heart Disease Maternal Grandfather        Social History     Socioeconomic History    Marital status:    Tobacco Use    Smoking status: Every Day     Current packs/day: 1.00     Average packs/day: 1 pack/day for 30.0 years (30.0 ttl pk-yrs)     Types: Cigarettes     Passive exposure: Current   Vaping Use    Vaping status: Former   Substance and Sexual Activity    Alcohol use: No     Alcohol/week: 0.0 standard drinks of alcohol    Drug use: No     Social  Determinants of Health     Food Insecurity: No Food Insecurity (7/10/2024)    Food Insecurity     Food Insecurity: Never true   Transportation Needs: No Transportation Needs (7/10/2024)    Transportation Needs     Lack of Transportation: No   Housing Stability: Low Risk  (7/10/2024)    Housing Stability     Housing Instability: No       Medications (Active prior to today's visit):  Current Outpatient Medications   Medication Sig Dispense Refill    Blood Glucose Monitoring Suppl (ONETOUCH VERIO FLEX SYSTEM) w/Device Does not apply Kit 1 kit every morning before breakfast. Test blood sugar daily (before breakfast or before dinner) 1 kit 0    Glucose Blood (ONETOUCH VERIO) In Vitro Strip Test blood sugar daily (before breakfast or before dinner) 50 strip 0    Lancets (ONETOUCH DELICA PLUS EQWLLY19A) Does not apply Misc 1 Lancet daily. Test blood sugar daily (before breakfast or before dinner) 100 each 0    Multiple Vitamins-Minerals (CENTRUM SILVER 50+WOMEN) Oral Tab Take 1 tablet by mouth daily.      NON FORMULARY Take 1 tablet by mouth daily. Product: Pure Synergy      CINNAMON OR Take 1 tablet by mouth daily.      NON FORMULARY Take by mouth daily. Product: Ka'Donis powder      cholecalciferol 50 MCG (2000 UT) Oral Cap Take 1 capsule (2,000 Units total) by mouth daily.      NON FORMULARY Take by mouth daily. Beet root powder      acetaminophen 325 MG Oral Tab Take 1-2 tablets (325-650 mg total) by mouth every 6 (six) hours as needed for Pain.      Fluticasone Propionate 50 MCG/ACT Nasal Suspension 1 spray by Each Nare route daily as needed for Rhinitis or Allergies.      B Complex Vitamins (B COMPLEX OR) Take 1 tablet by mouth daily.      Bromelains (BROMELAIN OR) Take 1 tablet by mouth daily.      COLLAGEN OR Take 1 tablet by mouth daily.      Green Tea, Camillia sinensis, (GREEN TEA OR) Take 1 tablet by mouth daily.      Omega-3 Fatty Acids (OMEGA-3 FISH OIL OR) Take 1 capsule by mouth daily.      Probiotic  Product (PROBIOTIC DAILY OR) Take 1 capsule by mouth daily.         Allergies:  No Known Allergies    Review of Systems:   A comprehensive 10-point review of systems was completed.  Pertinent positives and negatives are noted in the the HPI.    Physical Exam:   CONSTITUTIONAL: Well developed, well nourished, in no acute distress  NEUROLOGIC: Alert and oriented  HEAD: Normocephalic, atraumatic  EYES: Sclera non-icteric  ENT: Hearing intact, moist mucous membranes  NECK: No obvious goiter or masses  RESPIRATORY: Normal respiratory effort  SKIN: No evident rashes  ABDOMEN: Soft, non-tender, non-distended    Assessment & Plan:   Kirstin Myers is a 54 year old female with history of diabetes who was seen for nephrolithiasis.  She presented with left flank pain and signs of infection.  CT 1/22/2024 shows moderate to severe hydronephrosis related to a large obstructing partial staghorn UPJ stone measuring 4.3 cm, as well as a 7 mm lower pole stone.  No right renal stones present.  She did have a stent placed by Dr. Pena on 1/22/2024.  She was unable to follow-up with [facility] due to insurance.     Repeat CT scan showed complete resolution of hydronephrosis, less parenchymal thinning than prior.  MAG3 renal scan on 3/22/2024 showed 25% function of the left kidney and 75% function of the right kidney.  Post-Lasix left T1/2 was 26 minutes on the left.  Given decent function of the left kidney I discussed her options again of PCNL versus nephrectomy.  I do think it is worth saving his kidney and perform PCNL given 25% function.  The obstruction noted on renal scan could be related to the large stone burden.     Scheduled for PCNL on 5/22/24.  I brought her to the OR on 4/15/2024 for stent exchange.  Her stent was significantly encrusted and needed to use a stone  to treat a large stone within the bladder around the distal coil of the stent.  She was scheduled for PCNL a few weeks after that, but needed to cancel  due to upper respiratory infection.     I brought her to the OR on 7/10/2024 for cystolitholapaxy of large distal stent coil stone, bladder fulguration, left PCNL.  She did well and was discharged on postoperative day 1.  Postoperative CT shows an 8 mm left lower pole stone fragment so I brought her back to the OR on 7/16/2024 for cystoscopy, left ureteroscopy, laser lithotripsy, stent exchange.  Stent removed in clinic 7/23/2024.    Renal ultrasound 8/23/2024 shows a possible 6 mm nonobstructing left lower pole renal stone, no hydronephrosis bilaterally.     24-hour urine study shows slightly high urine calcium, slightly high urine oxalate, slightly low urine pH, slightly high uric acid.     Stone analysis: 100% struvite   Serum calcium: 8.8    She is feeling well at this time with no abdominal or flank pain.    -Repeat 24-hour urine study and renal ultrasound in 1 year  -Continue good fluid intake  -Continue limiting dietary sodium    In total, 30 minutes were spent on this patient encounter (including chart review, patient history, physical, and counseling, documentation, and communication).    Kip Ann MD  Staff Urologist  [facility]  Office: [phone]

## 2024-09-04 NOTE — TELEPHONE ENCOUNTER
Pt. called to request a refill for diabetes testing supplies. Rx sent to pharmacy per pt. Request.

## 2024-09-17 ENCOUNTER — NURSE ONLY (OUTPATIENT)
Dept: ENDOCRINOLOGY CLINIC | Facility: CLINIC | Age: 54
End: 2024-09-17
Payer: MEDICAID

## 2024-09-17 DIAGNOSIS — E11.9 TYPE 2 DIABETES MELLITUS WITHOUT COMPLICATION, WITHOUT LONG-TERM CURRENT USE OF INSULIN (HCC): Primary | ICD-10-CM

## 2024-09-17 PROCEDURE — G0109 DIAB MANAGE TRN IND/GROUP: HCPCS

## 2024-09-18 NOTE — PROGRESS NOTES
Kirstin Myers 3/24/1970  attended Step 4 Group Class: Reducing Complications, Stress Management, Disaster Planning, & Special Occasions   Date: 9/18/2024          Referring Provider:  Joya Garcia                Start time: 5:30 PM      End time: 6:30 PM     The patient participated during the class: Patient was able to identify ways to reduce risks for micro and macro-vascular complications of diabetes.      Healthy Eating  Discuss tips for dining out, holidays and special occasion eating.  Reviewed carbohydrate counting and balancing meals.     Being Active  Reinforce the importance of regular physical activity.     Monitoring  Discuss/answer questions about blood glucose trends.     Taking Medication   Reinforce the importance of taking diabetes medications as prescribed.     Problem Solving  Discuss disaster preparedness and planning to travel safely with diabetes.  Discussed importance of medic alert identification.     Reducing Risks  Discuss potential complications of uncontrolled diabetes and how to minimize risk including eye, foot, dental, and skin care.   Renal and cardiovascular monitoring discussed including target goals and signs/symptoms of MI and CVA.   Discuss immunizations recommended for diabetes.    Healthy Coping  Discuss support plan, stress reduction, and diabetes distress.  Discussed coping strategies and how to apply.  Reviewed when to ask for extra help with coping.      The patient verbalized understanding and has no further questions at this time.  Written materials provided for all areas covered.  Recommendation: attend Step 5 Class.    Courtney NIEVES-RN, [facility]

## 2024-09-25 RX ORDER — BLOOD SUGAR DIAGNOSTIC
STRIP MISCELLANEOUS
Qty: 200 STRIP | Refills: 3 | Status: SHIPPED | OUTPATIENT
Start: 2024-09-25

## 2024-10-01 ENCOUNTER — NURSE ONLY (OUTPATIENT)
Age: 54
End: 2024-10-01
Payer: MEDICAID

## 2024-10-01 DIAGNOSIS — E11.9 TYPE 2 DIABETES MELLITUS WITHOUT COMPLICATION, WITHOUT LONG-TERM CURRENT USE OF INSULIN (HCC): Primary | ICD-10-CM

## 2024-10-01 PROCEDURE — G0109 DIAB MANAGE TRN IND/GROUP: HCPCS

## 2024-10-02 NOTE — PROGRESS NOTES
Kirstin Myers : 3/24/1970 attended Step 5 Group Class: Heart Healthy Eating and Exercise    Date: 10/1/2024           Referring Provider: Joya Garcia                 Start time: 5:30 PM       End time: 6:30 PM     The patient participated during the class: Patient verbalized dietary changes recommended to reduce saturated fat and sodium and to increase dietary fiber.      Reviewed complications, including DKA (diabetic ketoacidosis) and HHNS (hyperosmolar, hyperglycemic non-ketotic syndrome).    Healthy Eating  Discussed heart healthy diet (types of fat, cholesterol, fiber and sodium)   Mediterranean and DASH diets including sample meal plans   Discussed reading nutrition facts labels, nutrition claims  Discussed challenges at the grocery store and when eating out and options                       Being Active  Discussed benefits and precautions of regular physical activity (aerobic exercise and strength training).     Monitoring  Discussed blood pressure goals.  Discussed lipid levels and goals.    Behavior Change  Reviewed and updated individual goals and action plan set by patient.     Problem Solving  Reviewed progress on overall goals to-date  Addressed barriers to setting and following guidelines presented/achieving goals, as a group discussion.     Recommendations:   Work toward limiting saturated fat and sodium and increasing fiber in diet.  Work toward getting 30 minutes of aerobic activity up to 5 or more days/week.  Continue implementing individual goals.  Follow up through MNT or DSMT as needed.  Complete Diabetes Continued Care/ADA Recommended Screenings (labs, eye exam, foot exam, dental exam, vaccines).    Written materials provided for all areas covered.    Courtney RICHN-RN, [facility]

## 2024-12-30 ENCOUNTER — HOSPITAL ENCOUNTER (EMERGENCY)
Facility: HOSPITAL | Age: 54
Discharge: HOME | End: 2024-12-30
Attending: EMERGENCY MEDICINE
Payer: MEDICAID

## 2024-12-30 ENCOUNTER — APPOINTMENT (OUTPATIENT)
Dept: CT IMAGING | Facility: HOSPITAL | Age: 54
End: 2024-12-30
Attending: EMERGENCY MEDICINE
Payer: MEDICAID

## 2024-12-30 VITALS
SYSTOLIC BLOOD PRESSURE: 108 MMHG | BODY MASS INDEX: 40.73 KG/M2 | OXYGEN SATURATION: 94 % | TEMPERATURE: 98 F | HEIGHT: 69 IN | WEIGHT: 275 LBS | DIASTOLIC BLOOD PRESSURE: 91 MMHG | HEART RATE: 78 BPM | RESPIRATION RATE: 16 BRPM

## 2024-12-30 DIAGNOSIS — N18.9 CHRONIC KIDNEY DISEASE, UNSPECIFIED CKD STAGE: ICD-10-CM

## 2024-12-30 DIAGNOSIS — R73.9 HYPERGLYCEMIA: ICD-10-CM

## 2024-12-30 DIAGNOSIS — N30.01 ACUTE CYSTITIS WITH HEMATURIA: Primary | ICD-10-CM

## 2024-12-30 LAB
ALBUMIN SERPL-MCNC: 4.2 G/DL (ref 3.2–4.8)
ALBUMIN/GLOB SERPL: 1.2 {RATIO} (ref 1–2)
ALP LIVER SERPL-CCNC: 85 U/L
ALT SERPL-CCNC: 23 U/L
ANION GAP SERPL CALC-SCNC: 4 MMOL/L (ref 0–18)
AST SERPL-CCNC: 18 U/L (ref ?–34)
BASOPHILS # BLD AUTO: 0.09 X10(3) UL (ref 0–0.2)
BASOPHILS NFR BLD AUTO: 1 %
BILIRUB SERPL-MCNC: 0.3 MG/DL (ref 0.3–1.2)
BILIRUB UR QL STRIP.AUTO: NEGATIVE
BUN BLD-MCNC: 11 MG/DL (ref 9–23)
CALCIUM BLD-MCNC: 10 MG/DL (ref 8.7–10.4)
CHLORIDE SERPL-SCNC: 107 MMOL/L (ref 98–112)
CO2 SERPL-SCNC: 28 MMOL/L (ref 21–32)
COLOR UR AUTO: YELLOW
CREAT BLD-MCNC: 1.25 MG/DL
EGFRCR SERPLBLD CKD-EPI 2021: 51 ML/MIN/1.73M2 (ref 60–?)
EOSINOPHIL # BLD AUTO: 0.04 X10(3) UL (ref 0–0.7)
EOSINOPHIL NFR BLD AUTO: 0.5 %
ERYTHROCYTE [DISTWIDTH] IN BLOOD BY AUTOMATED COUNT: 14.1 %
GLOBULIN PLAS-MCNC: 3.4 G/DL (ref 2–3.5)
GLUCOSE BLD-MCNC: 143 MG/DL (ref 70–99)
GLUCOSE UR STRIP.AUTO-MCNC: NORMAL MG/DL
HCT VFR BLD AUTO: 47.3 %
HGB BLD-MCNC: 15.8 G/DL
IMM GRANULOCYTES # BLD AUTO: 0.04 X10(3) UL (ref 0–1)
IMM GRANULOCYTES NFR BLD: 0.5 %
KETONES UR STRIP.AUTO-MCNC: NEGATIVE MG/DL
LEUKOCYTE ESTERASE UR QL STRIP.AUTO: 250
LYMPHOCYTES # BLD AUTO: 2.03 X10(3) UL (ref 1–4)
LYMPHOCYTES NFR BLD AUTO: 23.5 %
MCH RBC QN AUTO: 29.7 PG (ref 26–34)
MCHC RBC AUTO-ENTMCNC: 33.4 G/DL (ref 31–37)
MCV RBC AUTO: 88.9 FL
MONOCYTES # BLD AUTO: 0.74 X10(3) UL (ref 0.1–1)
MONOCYTES NFR BLD AUTO: 8.6 %
NEUTROPHILS # BLD AUTO: 5.69 X10 (3) UL (ref 1.5–7.7)
NEUTROPHILS # BLD AUTO: 5.69 X10(3) UL (ref 1.5–7.7)
NEUTROPHILS NFR BLD AUTO: 65.9 %
NITRITE UR QL STRIP.AUTO: NEGATIVE
OSMOLALITY SERPL CALC.SUM OF ELEC: 290 MOSM/KG (ref 275–295)
PH UR STRIP.AUTO: 7.5 [PH] (ref 5–8)
PLATELET # BLD AUTO: 306 10(3)UL (ref 150–450)
POTASSIUM SERPL-SCNC: 4.4 MMOL/L (ref 3.5–5.1)
PROT SERPL-MCNC: 7.6 G/DL (ref 5.7–8.2)
PROT UR STRIP.AUTO-MCNC: 20 MG/DL
RBC # BLD AUTO: 5.32 X10(6)UL
SODIUM SERPL-SCNC: 139 MMOL/L (ref 136–145)
SP GR UR STRIP.AUTO: 1.01 (ref 1–1.03)
UROBILINOGEN UR STRIP.AUTO-MCNC: NORMAL MG/DL
WBC # BLD AUTO: 8.6 X10(3) UL (ref 4–11)
WBC #/AREA URNS AUTO: >50 /HPF

## 2024-12-30 PROCEDURE — 36415 COLL VENOUS BLD VENIPUNCTURE: CPT

## 2024-12-30 PROCEDURE — 80053 COMPREHEN METABOLIC PANEL: CPT | Performed by: EMERGENCY MEDICINE

## 2024-12-30 PROCEDURE — 80053 COMPREHEN METABOLIC PANEL: CPT

## 2024-12-30 PROCEDURE — 85025 COMPLETE CBC W/AUTO DIFF WBC: CPT | Performed by: EMERGENCY MEDICINE

## 2024-12-30 PROCEDURE — 85025 COMPLETE CBC W/AUTO DIFF WBC: CPT

## 2024-12-30 PROCEDURE — 81001 URINALYSIS AUTO W/SCOPE: CPT

## 2024-12-30 PROCEDURE — 99284 EMERGENCY DEPT VISIT MOD MDM: CPT

## 2024-12-30 PROCEDURE — 81001 URINALYSIS AUTO W/SCOPE: CPT | Performed by: EMERGENCY MEDICINE

## 2024-12-30 PROCEDURE — 74176 CT ABD & PELVIS W/O CONTRAST: CPT | Performed by: EMERGENCY MEDICINE

## 2024-12-30 PROCEDURE — 99285 EMERGENCY DEPT VISIT HI MDM: CPT

## 2024-12-30 RX ORDER — CEPHALEXIN 500 MG/1
500 CAPSULE ORAL 3 TIMES DAILY
Qty: 21 CAPSULE | Refills: 0 | Status: SHIPPED | OUTPATIENT
Start: 2024-12-30 | End: 2025-01-06

## 2024-12-30 NOTE — ED INITIAL ASSESSMENT (HPI)
PT states that she began to experience very bloody urine a week ago, fever a couple days later. PT adds that she feels that she had a rigors episode a few days ago with feeling very cold, unable to warm up. PT is concerned about rigors due to hx of bladder infection.

## 2024-12-31 NOTE — ED PROVIDER NOTES
Patient Seen in: [facility] Emergency Department      History     Chief Complaint   Patient presents with    Body ache and/or chills    Fever     Stated Complaint:     Subjective:   54-year-old female, history of staghorn calculus, presents with hematuria, chills, intermittent fever ongoing for about 5 days.  States fever last was yesterday, rigors yesterday as well she states.  States that she had similar symptoms few months ago and she had a stay, calculus that had to be removed by urology.  States she noticed 5 days ago on course is more that she had gross hematuria.  Had a little bit of a twinge in the right lower quadrant and that resolved that day but she still has intermittent hematuria intermittent fever send so she came in for evaluation.  No flank pain.  No chest pain cough or shortness of breath.  No vomiting.  No diarrhea.              Objective:     No pertinent past medical history.            No pertinent past surgical history.              No pertinent social history.                Physical Exam     ED Triage Vitals [12/30/24 1747]   /85   Pulse 86   Resp 16   Temp 97.8 °F (36.6 °C)   Temp src Temporal   SpO2 95 %   O2 Device None (Room air)       Current Vitals:   Vital Signs  BP: (!) 108/91  Pulse: 78  Resp: 16  Temp: 97.8 °F (36.6 °C)  Temp src: Temporal  MAP (mmHg): 97    Oxygen Therapy  SpO2: 94 %  O2 Device: None (Room air)        Physical Exam  Vitals and nursing note reviewed.   Constitutional:       Appearance: She is not toxic-appearing.   HENT:      Head: Normocephalic.   Cardiovascular:      Rate and Rhythm: Normal rate.   Pulmonary:      Effort: Pulmonary effort is normal. No respiratory distress.      Breath sounds: Normal breath sounds.   Abdominal:      General: There is no distension.      Palpations: Abdomen is soft.      Tenderness: There is no abdominal tenderness. There is no right CVA tenderness, left CVA tenderness, guarding or rebound.   Musculoskeletal:       Cervical back: Neck supple.   Skin:     General: Skin is warm and dry.   Neurological:      General: No focal deficit present.      Mental Status: She is alert.   Psychiatric:         Mood and Affect: Mood normal.         Behavior: Behavior normal.             ED Course     Labs Reviewed   CBC WITH DIFFERENTIAL WITH PLATELET - Abnormal; Notable for the following components:       Result Value    RBC 5.32 (*)     All other components within normal limits   COMP METABOLIC PANEL (14) - Abnormal; Notable for the following components:    Glucose 143 (*)     Creatinine 1.25 (*)     eGFR-Cr 51 (*)     All other components within normal limits   URINALYSIS, ROUTINE - Abnormal; Notable for the following components:    Clarity Urine Turbid (*)     Blood Urine Trace (*)     Protein Urine 20 (*)     Leukocyte Esterase Urine 250 (*)     WBC Urine >50 (*)     RBC Urine 6-10 (*)     Bacteria Urine 1+ (*)     Squamous Epi. Cells Few (*)     All other components within normal limits                   MDM      CT ABDOMEN+PELVIS KIDNEYSTONE 2D RNDR(NO IV,NO ORAL)(CPT=74176)    Result Date: 12/30/2024  CONCLUSION:   1. Mild nonspecific bilateral perinephric stranding.  Mild asymmetric fat stranding along the right ureter with stone noted in the dependent portion of the right side the urinary bladder measuring up to 6 mm is likely related to a recently passed stone, with pyelonephritis not excluded.  Clinical correlation recommended.  2. Right nephrolithiasis.  3. Decrease in size of probable reactive retroperitoneal lymph nodes as above.  4. Uncomplicated colonic diverticulosis.  Please see above for further details.  LOCATION:  [facility]   Dictated by (CST): Chuck Cast MD on 12/30/2024 at 9:16 PM     Finalized by (CST): Chuck Cast MD on 12/30/2024 at 9:20 PM        I independently interpreted the CT abdomen pelvis without any obvious signs of acute obstructing uropathy    External chart review demonstrates her outpatient  urology visit as recently September    Differential diagnosis includes, but not limited to, stay, calculi, renal failure, obstructive uropathy, UTI, GI causes 54-year-old female presents with acute cystitis with hematuria.  Likely secondary to recently passed kidney stone with a 6 mm dependent stone seen in the bladder.  Some right sided fat stranding noted consistent with her history recently as well as her right lower quadrant pain which is since resolved.  Did offer discussed consider further work and evaluation, IV antibiotics and admission to hospital for urological consultation.  She has no flank pain.  Abdominal pain.  Labs are stable.  She has some mild chronic kidney disease.  She is comfortable discharge home.  States was most recently on Bactrim, she has no antibiotic allergies we will place her on Keflex for a week and she will follow-up with Dr. Deluca as an outpatient.  Strict return precaution provided, shared decision made utilized to discharge home after discussion of risk, benefits, and alternatives    Patient was screened and evaluated during this visit.  As the treating physician attending to the patient, I determined within reasonable clinical confidence and prior to discharge, that an emergency medical condition was not or was no longer present.  There was no indication for further evaluation, treatment, or admission on an emergency basis.  Comprehensive verbal and written discharge and follow-up instructions were provided to help prevent relapse or worsening.  Patient was instructed to follow-up with their primary care provider for further evaluation and treatment, return immediately to ER for worsening, concerning, new, or changing/persisting symptoms. I discussed the case with the patient and they had no questions, complaints, or concerns.  Patient was comfortable going home.     Per the discharge paperwork, patients are encouraged to and given instructions on how to sign up for [facility],  where they have access to their records, including any/all incidental findings.     This note was prepared using Dragon Medical voice recognition dictation software. As a result errors may occur. When identified these errors have been corrected. While every attempt is made to correct errors during dictation discrepancies may still exist    Note to patient: The 21st Century Cures Act makes medical notes like these available to patients in the interest of transparency. However, this is a medical document intended as peer to peer communication. It is written in medical language and may contain abbreviations or verbiage that are unfamiliar. It may appear blunt or direct. Medical documents are intended to carry relevant information, facts as evident, and the clinical opinion of the practitioner.           Medical Decision Making      Disposition and Plan     Clinical Impression:  1. Acute cystitis with hematuria    2. Chronic kidney disease, unspecified CKD stage    3. Hyperglycemia         Disposition:  Discharge  12/30/2024  9:41 pm    Follow-up:  Kip Ann MD  [address]  [facility] [address]  [phone]    Follow up            Medications Prescribed:  Current Discharge Medication List        START taking these medications    Details   cephALEXin 500 MG Oral Cap Take 1 capsule (500 mg total) by mouth 3 (three) times daily for 7 days.  Qty: 21 capsule, Refills: 0                 Supplementary Documentation:

## 2025-03-18 ENCOUNTER — OFFICE VISIT (OUTPATIENT)
Dept: SURGERY | Facility: CLINIC | Age: 55
End: 2025-03-18
Payer: MEDICAID

## 2025-03-18 DIAGNOSIS — N20.0 NEPHROLITHIASIS: Primary | ICD-10-CM

## 2025-03-18 LAB
APPEARANCE: CLEAR
BILIRUBIN: NEGATIVE
GLUCOSE (URINE DIPSTICK): NEGATIVE MG/DL
KETONES (URINE DIPSTICK): NEGATIVE MG/DL
MULTISTIX LOT#: ABNORMAL NUMERIC
NITRITE, URINE: NEGATIVE
PH, URINE: 7 (ref 4.5–8)
SPECIFIC GRAVITY: 1.01 (ref 1–1.03)
URINE-COLOR: YELLOW
UROBILINOGEN,SEMI-QN: 0.2 MG/DL (ref 0–1.9)

## 2025-03-18 PROCEDURE — 99214 OFFICE O/P EST MOD 30 MIN: CPT | Performed by: SURGERY

## 2025-03-18 PROCEDURE — 81003 URINALYSIS AUTO W/O SCOPE: CPT | Performed by: SURGERY

## 2025-03-18 RX ORDER — OXYBUTYNIN CHLORIDE 10 MG/1
TABLET, EXTENDED RELEASE ORAL
COMMUNITY
Start: 2025-02-23

## 2025-03-18 NOTE — PROGRESS NOTES
Urology Clinic Note    Primary Care Provider:  Pcp, None     Chief Complaint:   Nephrolithiasis    HPI & Assessment:   Kirstin Myers is a 54 year old female with history of diabetes who was seen for nephrolithiasis.  She presented with left flank pain and signs of infection.  CT 1/22/2024 shows moderate to severe hydronephrosis related to a large obstructing partial staghorn UPJ stone measuring 4.3 cm, as well as a 7 mm lower pole stone.  No right renal stones present.  She did have a stent placed by Dr. Pena on 1/22/2024.  She was unable to follow-up with [facility] due to insurance.     Repeat CT scan showed complete resolution of hydronephrosis, less parenchymal thinning than prior.  MAG3 renal scan on 3/22/2024 showed 25% function of the left kidney and 75% function of the right kidney.  Post-Lasix left T1/2 was 26 minutes on the left.  Given decent function of the left kidney I discussed her options again of PCNL versus nephrectomy.  I do think it is worth saving his kidney and perform PCNL given 25% function.  The obstruction noted on renal scan could be related to the large stone burden.     Scheduled for PCNL on 5/22/24.  I brought her to the OR on 4/15/2024 for stent exchange.  Her stent was significantly encrusted and needed to use a stone  to treat a large stone within the bladder around the distal coil of the stent.  She was scheduled for PCNL a few weeks after that, but needed to cancel due to upper respiratory infection.     I brought her to the OR on 7/10/2024 for cystolitholapaxy of large distal stent coil stone, bladder fulguration, left PCNL.  She did well and was discharged on postoperative day 1.  Postoperative CT shows an 8 mm left lower pole stone fragment so I brought her back to the OR on 7/16/2024 for cystoscopy, left ureteroscopy, laser lithotripsy, stent exchange.  Stent removed in clinic 7/23/2024.     Renal ultrasound 8/23/2024 shows a possible 6 mm nonobstructing left lower  pole renal stone, no hydronephrosis bilaterally.      24-hour urine study shows slightly high urine calcium, slightly high urine oxalate, slightly low urine pH, slightly high uric acid.  Patient plans to make dietary modifications and repeat 24-hour urine study in about 1 year.    Patient was in the ED on 2024 for body aches and chills.  She had gross hematuria.  CT showed no significant right hydronephrosis, stranding around the right ureter, 5 mm right UVJ stone versus recently passed right ureteral stone in bladder.  Punctate 1 mm nonobstructing left renal stone without hydronephrosis.  No signs of obstruction at that time.  2 and 3 mm nonobstructing RLP stones.  She did have some intermittent mild right flank pain over the past few months.  She did pass a small stone fragment few weeks ago.  She has no flank pain at this time.     24 Hour Metabolic Urine Study Results     Volume: 2050 mL/day  Goal >2500  Sodium: 137 mmol/day  Goal <150  Calcium: 356 mg/day  Goal <250  Citrate: 596 mg/day  Goal >450  Oxalate: 59 mg/day  Goal <40  pH: 5.665  Goal 5.8 - 6.2 (>6 for UA stones, >7 for cystine stone)  Creatinine: 1884 mg/day  Normal 18-20 mg/kilogram/day (female)  Normal 20-22 mg/kilogram/day (male)  Uric acid: 990 mg/day  Goal <750  Cystinuria present? No     Stone analysis: 100% struvite   Serum calcium: 8.8    She is feeling well at this time with no abdominal or flank pain.    Urinalysis: Moderate blood, small LE    Plan:   -Follow-up microscopic UA and urine culture  -Renal ultrasound to ensure no hydronephrosis  -Return to clinic in 6 months with 24-hour urine test prior       History:     Past Medical History:    Calculus of kidney    Prediabetes    Visual impairment    GLASSES       Past Surgical History:   Procedure Laterality Date          Cystoscopy,insert ureteral stent      7/10/24 LEFT percutaneous nephrolithotomy    Hernia surgery            Other surgical history  2024    kidney  stone    Other surgical history  04/15/2024    stent exchange    Other surgical history  07/10/2024    NEPHROSTOMY TUBE IN  PLACE    Repair ing hernia,5+y/o,reducibl      Ventral hernia repair N/A 05/25/2016    Procedure: HERNIA VENTRAL REPAIR;  Surgeon: Vijay Randall MD;  Location:  MAIN OR       Family History   Problem Relation Age of Onset    Cancer Mother     Heart Disease Maternal Grandfather        Social History     Socioeconomic History    Marital status:    Tobacco Use    Smoking status: Every Day     Current packs/day: 1.00     Average packs/day: 1 pack/day for 30.0 years (30.0 ttl pk-yrs)     Types: Cigarettes     Passive exposure: Current   Vaping Use    Vaping status: Former   Substance and Sexual Activity    Alcohol use: No     Alcohol/week: 0.0 standard drinks of alcohol    Drug use: No     Social Drivers of Health     Food Insecurity: No Food Insecurity (7/10/2024)    Food Insecurity     Food Insecurity: Never true   Transportation Needs: No Transportation Needs (7/10/2024)    Transportation Needs     Lack of Transportation: No   Housing Stability: Low Risk  (7/10/2024)    Housing Stability     Housing Instability: No       Medications (Active prior to today's visit):  Current Outpatient Medications   Medication Sig Dispense Refill    oxybutynin ER 10 MG Oral Tablet 24 Hr       Glucose Blood (ONETOUCH VERIO) In Vitro Strip Test blood sugar daily (before breakfast or before dinner) 200 strip 3    Glucose Blood (ONETOUCH VERIO) In Vitro Strip Check blood sugar twice daily 200 strip 3    OneTouch Delica Lancets 30G Does not apply Misc 1 each 2 (two) times daily. 200 each 3    Blood Glucose Monitoring Suppl (ONETOUCH VERIO FLEX SYSTEM) w/Device Does not apply Kit 1 kit every morning before breakfast. Test blood sugar daily (before breakfast or before dinner) 1 kit 0    Lancets (ONETOUCH DELICA PLUS [id number]) Does not apply Misc 1 Lancet daily. Test blood sugar daily (before breakfast or  before dinner) 100 each 0    Multiple Vitamins-Minerals (CENTRUM SILVER 50+WOMEN) Oral Tab Take 1 tablet by mouth daily.      NON FORMULARY Take 1 tablet by mouth daily. Product: Pure Synergy      CINNAMON OR Take 1 tablet by mouth daily.      NON FORMULARY Take by mouth daily. Product: Jersonva powder      cholecalciferol 50 MCG (2000 UT) Oral Cap Take 1 capsule (2,000 Units total) by mouth daily.      NON FORMULARY Take by mouth daily. Beet root powder      acetaminophen 325 MG Oral Tab Take 1-2 tablets (325-650 mg total) by mouth every 6 (six) hours as needed for Pain.      Fluticasone Propionate 50 MCG/ACT Nasal Suspension 1 spray by Each Nare route daily as needed for Rhinitis or Allergies.      B Complex Vitamins (B COMPLEX OR) Take 1 tablet by mouth daily.      Bromelains (BROMELAIN OR) Take 1 tablet by mouth daily.      COLLAGEN OR Take 1 tablet by mouth daily.      Green Tea, Camillia sinensis, (GREEN TEA OR) Take 1 tablet by mouth daily.      Omega-3 Fatty Acids (OMEGA-3 FISH OIL OR) Take 1 capsule by mouth daily.      Probiotic Product (PROBIOTIC DAILY OR) Take 1 capsule by mouth daily.         Allergies:  Allergies[1]    Review of Systems:   A comprehensive 10-point review of systems was completed.  Pertinent positives and negatives are noted in the the HPI.    Physical Exam:   CONSTITUTIONAL: Well developed, well nourished, in no acute distress  NEUROLOGIC: Alert and oriented  HEAD: Normocephalic, atraumatic  EYES: Sclera non-icteric  ENT: Hearing intact, moist mucous membranes  NECK: No obvious goiter or masses  RESPIRATORY: Normal respiratory effort  SKIN: No evident rashes  ABDOMEN: Soft, non-tender, non-distended      In total, 30 minutes were spent on this patient encounter (including chart review, patient history, physical, and counseling, documentation, and communication).    Kip Ann MD  Staff Urologist  [facility]  Office: [phone]           [1] No Known Allergies

## 2025-03-21 ENCOUNTER — TELEPHONE (OUTPATIENT)
Dept: SURGERY | Facility: CLINIC | Age: 55
End: 2025-03-21

## 2025-03-21 RX ORDER — SULFAMETHOXAZOLE AND TRIMETHOPRIM 800; 160 MG/1; MG/1
1 TABLET ORAL 2 TIMES DAILY
Qty: 14 TABLET | Refills: 0 | Status: SHIPPED | OUTPATIENT
Start: 2025-03-21 | End: 2025-03-28

## 2025-03-21 NOTE — TELEPHONE ENCOUNTER
This encounter is now closed.     RN called patient. No answer. Left message to  ABT at pharmacy.   RN sent message via [employer]     Collected 3/18/2025  3:09 PM  Status: Final result    URINE CULTURE >100,000 CFU/ML Proteus mirabilis Abnormal         Kip Ann MD  3/21/2025  9:13 AM CDT       Urine culture growing Proteus resistant to nitrofurantoin.  Will send Bactrim.  Clinic staff to contact.     Outpatient Medication Detail   Disp Refills Start End    sulfamethoxazole-trimethoprim -160 MG Oral Tab per tablet 14 tablet 0 3/21/2025 3/28/2025    Sig - Route: Take 1 tablet by mouth 2 (two) times daily for 7 days. - Oral      Pharmacy  [id number] - [address] & [place], [phone], [phone]

## 2025-03-21 NOTE — PROGRESS NOTES
Urine culture growing Proteus resistant to nitrofurantoin.  Will send Bactrim.  Clinic staff to contact.    Future Appointments  9/26/2025  11:30 AM   Kip Ann MD           [id number]            Nap 4

## 2025-03-21 NOTE — TELEPHONE ENCOUNTER
Can someone contact this patient and let her know I sent antibiotics to her pharmacy for UTI?    Thanks,  MB

## 2025-05-29 ENCOUNTER — HOSPITAL ENCOUNTER (INPATIENT)
Facility: HOSPITAL | Age: 55
LOS: 3 days | Discharge: HOME | End: 2025-06-01
Attending: EMERGENCY MEDICINE | Admitting: INTERNAL MEDICINE
Payer: MEDICAID

## 2025-05-29 ENCOUNTER — APPOINTMENT (OUTPATIENT)
Dept: CT IMAGING | Facility: HOSPITAL | Age: 55
End: 2025-05-29
Attending: EMERGENCY MEDICINE
Payer: MEDICAID

## 2025-05-29 DIAGNOSIS — N17.9 AKI (ACUTE KIDNEY INJURY): Primary | ICD-10-CM

## 2025-05-29 DIAGNOSIS — N12 PYELONEPHRITIS: ICD-10-CM

## 2025-05-29 LAB
ALBUMIN SERPL-MCNC: 3.8 G/DL (ref 3.2–4.8)
ALBUMIN/GLOB SERPL: 1.2 {RATIO} (ref 1–2)
ALP LIVER SERPL-CCNC: 134 U/L (ref 46–118)
ALT SERPL-CCNC: 25 U/L (ref 10–49)
ANION GAP SERPL CALC-SCNC: 15 MMOL/L (ref 0–18)
AST SERPL-CCNC: 28 U/L (ref ?–34)
BASOPHILS # BLD: 0 X10(3) UL (ref 0–0.2)
BASOPHILS NFR BLD: 0 %
BILIRUB SERPL-MCNC: 0.3 MG/DL (ref 0.3–1.2)
BILIRUB UR QL CFM: NEGATIVE
BUN BLD-MCNC: 55 MG/DL (ref 9–23)
CALCIUM BLD-MCNC: 10.5 MG/DL (ref 8.7–10.6)
CHLORIDE SERPL-SCNC: 97 MMOL/L (ref 98–112)
CO2 SERPL-SCNC: 23 MMOL/L (ref 21–32)
CREAT BLD-MCNC: 6.2 MG/DL (ref 0.55–1.02)
EGFRCR SERPLBLD CKD-EPI 2021: 7 ML/MIN/1.73M2 (ref 60–?)
EOSINOPHIL # BLD: 0 X10(3) UL (ref 0–0.7)
EOSINOPHIL NFR BLD: 0 %
ERYTHROCYTE [DISTWIDTH] IN BLOOD BY AUTOMATED COUNT: 14.7 %
FLUAV + FLUBV RNA SPEC NAA+PROBE: NEGATIVE
FLUAV + FLUBV RNA SPEC NAA+PROBE: NEGATIVE
GLOBULIN PLAS-MCNC: 3.3 G/DL (ref 2–3.5)
GLUCOSE BLD-MCNC: 127 MG/DL (ref 70–99)
GLUCOSE BLD-MCNC: 141 MG/DL (ref 70–99)
GLUCOSE UR STRIP.AUTO-MCNC: NEGATIVE MG/DL
HCT VFR BLD AUTO: 37.9 % (ref 35–48)
HGB BLD-MCNC: 12.6 G/DL (ref 12–16)
LACTATE SERPL-SCNC: 1.7 MMOL/L (ref 0.5–2)
LYMPHOCYTES NFR BLD: 1.36 X10(3) UL (ref 1–4)
LYMPHOCYTES NFR BLD: 5 %
MCH RBC QN AUTO: 29.6 PG (ref 26–34)
MCHC RBC AUTO-ENTMCNC: 33.2 G/DL (ref 31–37)
MCV RBC AUTO: 89.2 FL (ref 80–100)
MONOCYTES # BLD: 0.81 X10(3) UL (ref 0.1–1)
MONOCYTES NFR BLD: 3 %
MORPHOLOGY: NORMAL
NEUTROPHILS # BLD AUTO: 25.03 X10 (3) UL (ref 1.5–7.7)
NEUTROPHILS NFR BLD: 82 %
NEUTS BAND NFR BLD: 10 %
NEUTS HYPERSEG # BLD: 24.93 X10(3) UL (ref 1.5–7.7)
NITRITE UR QL STRIP.AUTO: NEGATIVE
OSMOLALITY SERPL CALC.SUM OF ELEC: 297 MOSM/KG (ref 275–295)
PH UR STRIP.AUTO: 7 [PH] (ref 5–8)
PLATELET # BLD AUTO: 249 10(3)UL (ref 150–450)
PLATELET MORPHOLOGY: NORMAL
POTASSIUM SERPL-SCNC: 4.8 MMOL/L (ref 3.5–5.1)
PROT SERPL-MCNC: 7.1 G/DL (ref 5.7–8.2)
PROT UR STRIP.AUTO-MCNC: >=300 MG/DL
RBC # BLD AUTO: 4.25 X10(6)UL (ref 3.8–5.3)
RSV RNA SPEC NAA+PROBE: NEGATIVE
SARS-COV-2 RNA RESP QL NAA+PROBE: NOT DETECTED
SODIUM SERPL-SCNC: 135 MMOL/L (ref 136–145)
SP GR UR STRIP.AUTO: 1.02 (ref 1–1.03)
TOTAL CELLS COUNTED BLD: 100
UROBILINOGEN UR STRIP.AUTO-MCNC: 1 MG/DL
WBC # BLD AUTO: 27.1 X10(3) UL (ref 4–11)
WBC #/AREA URNS AUTO: >50 /HPF
WBC #/AREA URNS AUTO: >50 /HPF

## 2025-05-29 PROCEDURE — 74176 CT ABD & PELVIS W/O CONTRAST: CPT | Performed by: EMERGENCY MEDICINE

## 2025-05-29 PROCEDURE — 99223 1ST HOSP IP/OBS HIGH 75: CPT | Performed by: INTERNAL MEDICINE

## 2025-05-29 RX ORDER — ACETAMINOPHEN 500 MG
500 TABLET ORAL EVERY 4 HOURS PRN
Status: DISCONTINUED | OUTPATIENT
Start: 2025-05-29 | End: 2025-06-01

## 2025-05-29 RX ORDER — SENNOSIDES 8.6 MG
17.2 TABLET ORAL NIGHTLY PRN
Status: DISCONTINUED | OUTPATIENT
Start: 2025-05-29 | End: 2025-06-01

## 2025-05-29 RX ORDER — SODIUM CHLORIDE 9 MG/ML
INJECTION, SOLUTION INTRAVENOUS CONTINUOUS
Status: DISCONTINUED | OUTPATIENT
Start: 2025-05-29 | End: 2025-05-31

## 2025-05-29 RX ORDER — NICOTINE 21 MG/24HR
1 PATCH, TRANSDERMAL 24 HOURS TRANSDERMAL DAILY
Status: DISCONTINUED | OUTPATIENT
Start: 2025-05-29 | End: 2025-06-01

## 2025-05-29 RX ORDER — ONDANSETRON 2 MG/ML
4 INJECTION INTRAMUSCULAR; INTRAVENOUS EVERY 6 HOURS PRN
Status: DISCONTINUED | OUTPATIENT
Start: 2025-05-29 | End: 2025-06-01

## 2025-05-29 RX ORDER — OXYCODONE HYDROCHLORIDE 5 MG/1
5 TABLET ORAL EVERY 4 HOURS PRN
Status: DISCONTINUED | OUTPATIENT
Start: 2025-05-29 | End: 2025-06-01

## 2025-05-29 RX ORDER — BISACODYL 10 MG
10 SUPPOSITORY, RECTAL RECTAL
Status: DISCONTINUED | OUTPATIENT
Start: 2025-05-29 | End: 2025-06-01

## 2025-05-29 RX ORDER — SODIUM CHLORIDE 9 MG/ML
125 INJECTION, SOLUTION INTRAVENOUS CONTINUOUS
Status: DISCONTINUED | OUTPATIENT
Start: 2025-05-29 | End: 2025-05-30

## 2025-05-29 RX ORDER — PROCHLORPERAZINE EDISYLATE 5 MG/ML
5 INJECTION INTRAMUSCULAR; INTRAVENOUS EVERY 8 HOURS PRN
Status: DISCONTINUED | OUTPATIENT
Start: 2025-05-29 | End: 2025-06-01

## 2025-05-29 RX ORDER — HEPARIN SODIUM 5000 [USP'U]/ML
5000 INJECTION, SOLUTION INTRAVENOUS; SUBCUTANEOUS EVERY 8 HOURS SCHEDULED
Status: DISCONTINUED | OUTPATIENT
Start: 2025-05-29 | End: 2025-06-01

## 2025-05-29 RX ORDER — POLYETHYLENE GLYCOL 3350 17 G/17G
17 POWDER, FOR SOLUTION ORAL DAILY PRN
Status: DISCONTINUED | OUTPATIENT
Start: 2025-05-29 | End: 2025-06-01

## 2025-05-29 NOTE — ED INITIAL ASSESSMENT (HPI)
Couple episodes of rigors and high fevers since Monday. + nausea not able to tolerate PO intake since Monday. + dizziness.

## 2025-05-29 NOTE — ED PROVIDER NOTES
Patient Seen in: [facility] Emergency Department        History  Chief Complaint   Patient presents with    Headache    Fever    Nausea/Vomiting/Diarrhea     Stated Complaint: HA, nausea, decreased appetite    Subjective:   HPI            55-year-old female history of staghorn calculus presents with chills, intermittent fevers.  Ongoing the last approximately 3 days.  Fever running 100-100.7F at home.  Had some right flank pain initially but that has resolved.  No hematuria.  No dysuria.  Some nausea.  No abdominal pain.  No cough or shortness of breath.      Objective:     Past Medical History:    Calculus of kidney    Prediabetes    Visual impairment    GLASSES              Past Surgical History:   Procedure Laterality Date          Cystoscopy,insert ureteral stent      7/10/24 LEFT percutaneous nephrolithotomy    Hernia surgery            Other surgical history  2024    kidney stone    Other surgical history  04/15/2024    stent exchange    Other surgical history  07/10/2024    NEPHROSTOMY TUBE IN  PLACE    Repair ing hernia,5+y/o,reducibl      Ventral hernia repair N/A 2016    Procedure: HERNIA VENTRAL REPAIR;  Surgeon: Vijay Randall MD;  Location:  MAIN OR                Social History     Socioeconomic History    Marital status:    Tobacco Use    Smoking status: Every Day     Current packs/day: 1.00     Average packs/day: 1 pack/day for 30.0 years (30.0 ttl pk-yrs)     Types: Cigarettes     Passive exposure: Current   Vaping Use    Vaping status: Former   Substance and Sexual Activity    Alcohol use: No     Alcohol/week: 0.0 standard drinks of alcohol    Drug use: No     Social Drivers of Health     Food Insecurity: No Food Insecurity (7/10/2024)    Food Insecurity     Food Insecurity: Never true   Transportation Needs: No Transportation Needs (7/10/2024)    Transportation Needs     Lack of Transportation: No   Housing Stability: Low Risk  (7/10/2024)    Housing  Stability     Housing Instability: No                                Physical Exam    ED Triage Vitals [05/29/25 1600]   BP 95/62   Pulse 91   Resp 20   Temp 98.3 °F (36.8 °C)   Temp src Oral   SpO2 93 %   O2 Device None (Room air)       Current Vitals:   Vital Signs  BP: 112/66  Pulse: 75  Resp: 16  Temp: 98.2 °F (36.8 °C)  Temp src: Oral  MAP (mmHg): 79    Oxygen Therapy  SpO2: 96 %  O2 Device: None (Room air)            Physical Exam  Constitutional:       General: Is not in acute distress.     Appearance: Is not ill-appearing.   HENT:      Head: Normocephalic and atraumatic.      Mouth/Throat:      Mouth: Mucous membranes are moist.   Eyes:      Conjunctiva/sclera: Conjunctivae normal.      Pupils: Pupils are equal, round, and reactive to light.   Cardiovascular:      Rate and Rhythm: Normal rate and regular rhythm.   Pulmonary:      Effort: Pulmonary effort is normal. No respiratory distress.      Breath sounds: Normal breath sounds.   Abdominal:      General: Abdomen is flat. There is no distension.      Tenderness: There is no abdominal tenderness.   Musculoskeletal:      Right lower leg: No edema.      Left lower leg: No edema.   Skin:     General: Skin is warm and dry.   Neurological:      General: No focal deficit present.      Mental Status: Is alert.           ED Course  Labs Reviewed   COMP METABOLIC PANEL (14) - Abnormal; Notable for the following components:       Result Value    Glucose 141 (*)     Sodium 135 (*)     Chloride 97 (*)     BUN 55 (*)     Creatinine 6.20 (*)     Calculated Osmolality 297 (*)     eGFR-Cr 7 (*)     Alkaline Phosphatase 134 (*)     All other components within normal limits   CBC WITH DIFFERENTIAL WITH PLATELET - Abnormal; Notable for the following components:    WBC 27.1 (*)     Neutrophil Absolute Prelim 25.03 (*)     All other components within normal limits   URINALYSIS WITH CULTURE REFLEX - Abnormal; Notable for the following components:    Clarity Urine Turbid (*)      Ketones Urine Trace (*)     Blood Urine Moderate (*)     Protein Urine >=300 (*)     Urobilinogen Urine 1.0 (*)     Leukocyte Esterase Urine Large (*)     WBC Urine >50 (*)     Bacteria Urine 3+ (*)     All other components within normal limits   MANUAL DIFFERENTIAL - Abnormal; Notable for the following components:    Neutrophil Absolute Manual 24.93 (*)     All other components within normal limits   UA MICROSCOPIC ONLY, URINE - Abnormal; Notable for the following components:    WBC Urine >50 (*)     Bacteria Urine 3+ (*)     All other components within normal limits   POCT GLUCOSE - Abnormal; Notable for the following components:    POC Glucose 127 (*)     All other components within normal limits   LACTIC ACID, PLASMA - Normal   ICTOTEST - Normal   SARS-COV-2/FLU A AND B/RSV BY PCR (GENEXPERT) - Normal    Narrative:     This test is intended for the qualitative detection and differentiation of SARS-CoV-2, influenza A, influenza B, and respiratory syncytial virus (RSV) viral RNA in nasopharyngeal or nares swabs from individuals suspected of respiratory viral infection consistent with COVID-19 by their healthcare provider. Signs and symptoms of respiratory viral infection due to SARS-CoV-2, influenza, and RSV can be similar.    Test performed using the Xpert Xpress SARS-CoV-2/FLU/RSV (real time RT-PCR)  assay on the GeneXpert instrument, [employer], [place] [address].   This test is being used under the Food and Drug Administration's Emergency Use Authorization.    The authorized Fact Sheet for Healthcare Providers for this assay is available upon request from the laboratory.   SCAN SLIDE   BLOOD CULTURE   BLOOD CULTURE   URINE CULTURE, ROUTINE     EKG    Rate, intervals and axes as noted on EKG Report.  Rate: 84  Rhythm: Normal sinus  Reading: No ST elevation or depression                                MDM     Considered all emergent etiologies, differential includes but is not limited to: Kidney stone,  pyelonephritis, UTI, musculoskeletal     I have independently visualized the radiology images, my focus/limited interpretation: CT scan no obstructing stones, bilateral perinephric stranding     Defer to radiologist for other/incidental findings    Labs notable for marked leukocytosis as well as JORGE LUIS.  Urine is consistent with infection.  Lactic is within normal limits.  Patient with hypotensive and responsive to fluids in the ER.  30 cc/kg bolus given based on ideal body weight and IV fluids started at a rate.  Started on ceftriaxone based on previous cultures.  On reassessment patient feeling markedly improved, blood pressure stable and normal perfusion parameters.  Discussed with Dr. Nicholas for admission.     Admission disposition: 5/29/2025  8:53 PM           Medical Decision Making      Disposition and Plan     Clinical Impression:  1. JORGE LUIS (acute kidney injury)    2. Pyelonephritis         Disposition:  Admit  5/29/2025  8:53 pm    Follow-up:  No follow-up provider specified.        Medications Prescribed:  Current Discharge Medication List                Supplementary Documentation:         Hospital Problems       Present on Admission  Date Reviewed: 3/18/2025          ICD-10-CM Noted POA    * (Principal) JORGE LUIS (acute kidney injury) N17.9 8/11/2024 Unknown

## 2025-05-30 PROBLEM — A41.9 SEPTIC SHOCK (HCC): Status: ACTIVE | Noted: 2025-05-30

## 2025-05-30 PROBLEM — R65.21 SEPTIC SHOCK (HCC): Status: ACTIVE | Noted: 2025-05-30

## 2025-05-30 PROBLEM — R78.81 BACTEREMIA: Status: ACTIVE | Noted: 2025-05-30

## 2025-05-30 LAB
ALBUMIN SERPL-MCNC: 3.5 G/DL (ref 3.2–4.8)
ALBUMIN/GLOB SERPL: 1.2 {RATIO} (ref 1–2)
ALP LIVER SERPL-CCNC: 126 U/L (ref 46–118)
ALT SERPL-CCNC: 31 U/L (ref 10–49)
ANION GAP SERPL CALC-SCNC: 12 MMOL/L (ref 0–18)
AST SERPL-CCNC: 36 U/L (ref ?–34)
ATRIAL RATE: 87 BPM
BILIRUB SERPL-MCNC: <0.2 MG/DL (ref 0.3–1.2)
BUN BLD-MCNC: 64 MG/DL (ref 9–23)
CALCIUM BLD-MCNC: 9.3 MG/DL (ref 8.7–10.6)
CHLORIDE SERPL-SCNC: 102 MMOL/L (ref 98–112)
CO2 SERPL-SCNC: 23 MMOL/L (ref 21–32)
CREAT BLD-MCNC: 5.09 MG/DL (ref 0.55–1.02)
EGFRCR SERPLBLD CKD-EPI 2021: 9 ML/MIN/1.73M2 (ref 60–?)
ERYTHROCYTE [DISTWIDTH] IN BLOOD BY AUTOMATED COUNT: 15.1 %
GLOBULIN PLAS-MCNC: 3 G/DL (ref 2–3.5)
GLUCOSE BLD-MCNC: 142 MG/DL (ref 70–99)
HCT VFR BLD AUTO: 35.2 % (ref 35–48)
HGB BLD-MCNC: 11.9 G/DL (ref 12–16)
INR BLD: 1.02 (ref 0.8–1.2)
MAGNESIUM SERPL-MCNC: 2.2 MG/DL (ref 1.6–2.6)
MCH RBC QN AUTO: 29.5 PG (ref 26–34)
MCHC RBC AUTO-ENTMCNC: 33.8 G/DL (ref 31–37)
MCV RBC AUTO: 87.3 FL (ref 80–100)
OSMOLALITY SERPL CALC.SUM OF ELEC: 305 MOSM/KG (ref 275–295)
P AXIS: 68 DEGREES
P-R INTERVAL: 122 MS
PHOSPHATE SERPL-MCNC: 3.5 MG/DL (ref 2.4–5.1)
PLATELET # BLD AUTO: 236 10(3)UL (ref 150–450)
POTASSIUM SERPL-SCNC: 4.1 MMOL/L (ref 3.5–5.1)
PROT SERPL-MCNC: 6.5 G/DL (ref 5.7–8.2)
PROTHROMBIN TIME: 13.6 SECONDS (ref 11.6–14.8)
Q-T INTERVAL: 370 MS
QRS DURATION: 86 MS
QTC CALCULATION (BEZET): 437 MS
R AXIS: 1 DEGREES
RBC # BLD AUTO: 4.03 X10(6)UL (ref 3.8–5.3)
SODIUM SERPL-SCNC: 137 MMOL/L (ref 136–145)
T AXIS: 97 DEGREES
VENTRICULAR RATE: 84 BPM
WBC # BLD AUTO: 21.4 X10(3) UL (ref 4–11)

## 2025-05-30 PROCEDURE — 99232 SBSQ HOSP IP/OBS MODERATE 35: CPT | Performed by: HOSPITALIST

## 2025-05-30 PROCEDURE — 99255 IP/OBS CONSLTJ NEW/EST HI 80: CPT | Performed by: INTERNAL MEDICINE

## 2025-05-30 PROCEDURE — 99222 1ST HOSP IP/OBS MODERATE 55: CPT | Performed by: PHYSICIAN ASSISTANT

## 2025-05-30 RX ORDER — CLOTRIMAZOLE 10 MG/1
1 LOZENGE ORAL AS NEEDED
Status: DISCONTINUED | OUTPATIENT
Start: 2025-05-30 | End: 2025-05-30

## 2025-05-30 RX ORDER — BENZONATATE 100 MG/1
100 CAPSULE ORAL 3 TIMES DAILY PRN
Status: DISCONTINUED | OUTPATIENT
Start: 2025-05-30 | End: 2025-06-01

## 2025-05-30 RX ORDER — CLOTRIMAZOLE 10 MG/1
1 LOZENGE ORAL
Status: DISCONTINUED | OUTPATIENT
Start: 2025-05-30 | End: 2025-05-30

## 2025-05-30 NOTE — CONSULTS
[facility]   part of [facility]    Report of Consultation    Kirstin Myers Patient Status:  Inpatient    3/24/1970 MRN [id number]   Location [facility] 5NW-A Attending Karen Nicholas MD   Hosp Day # 1 PCP None Pcp     Date of Admission:  2025  Date of Consult:  2025    Reason for Consultation:  Pyelonephritis, bilateral non obstructing stones    History of Present Illness:  Kirstin Myers is a a(n) 55 year old female with hx of preDM, nephrolithiasis, who presented to the ED today for rigors and bilateral flank pain. She was afebrile but hypotensive upon presentation. Labs showed white count of 27.1 with left shift, lactate 1.7, and, sodium 135, and serum creatinine 6.20. UA abnormal. CT scan with bilateral perinephric stranding and bilateral non obstructing renal stones. She was started on IV abx, fluid bolus and admitted for further evaluation and mgmt. Today preliminary blood cultures +Proteus. Scr 5.09 and WBC 21.4. Repeat blood cultures drawn. She remains on Rocephin.    Urology consulted regarding CT scan findings.     Patient followed by Dr. Kip Ann for nephrolithiasis. Originally seen in 2024 when she presented to the ED with obstructing stone and infection. She had ureteral stent placed by Dr. Jason Pena and subsequently transitioned care to [place] due to insurance changes. Repeat CT scan showed complete resolution of hydronephrosis, less parenchymal thinning. MAG3 renal scan showed 25% function of the left kidney and 75% function of the right. Post lasix left T1/2 was 26. Given reasonable function she was given option of PCNL. She was taken for PCNL, cystolitholapaxy of large distal stent coil stone, 7/10/24. Post operatively CT scan showed 8mm left lower stone fragment so she was brought back to the OR for left ureteroscopy 24. Stent later removed 24. Of note, stone composition 100% struvite.    Repeat imaging in 2024 showed punctate left  renal stone and 2 and 3mm non obstructing RLP stones. She has been focusing on liberal water intake for stone prevention. No recent UTIs.     She notes that she developed rigors with bilateral flank pain 3 days ago. Has chronic urinary frequency, without any noticeable change. No gross hematuria. No fevers at home.     Feeling a bit better currently. Voiding frequently, but no other complaints.    History:  Past Medical History[1]  Past Surgical History[2]  Family History[3]   reports that she has been smoking cigarettes. She has a 30 pack-year smoking history. She has been exposed to tobacco smoke. She does not have any smokeless tobacco history on file. She reports that she does not drink alcohol and does not use drugs.    Allergies:  Allergies[4]    Medications:  Current Hospital Medications[5]    Review of Systems:  Pertinent items are noted in HPI.    Physical Exam:  /69 (BP Location: Left arm)   Pulse 80   Temp 98.1 °F (36.7 °C) (Oral)   Resp 18   Ht 5' 9\" (1.753 m)   Wt 280 lb (127 kg)   LMP 04/01/2023 (Approximate)   SpO2 94%   BMI 41.35 kg/m²   General appearance: alert, appears stated age, cooperative, and no distress  Head: Normocephalic, without obvious abnormality, atraumatic  Lungs: non labored respirations  Neurologic: Grossly normal  Psych appropriate affect and mood    Laboratory Data:  Lab Results   Component Value Date    WBC 21.4 05/30/2025    HGB 11.9 05/30/2025    HCT 35.2 05/30/2025    .0 05/30/2025    CREATSERUM 5.09 05/30/2025    BUN 64 05/30/2025     05/30/2025    K 4.1 05/30/2025     05/30/2025    CO2 23.0 05/30/2025     05/30/2025    CA 9.3 05/30/2025    ALB 3.5 05/30/2025    ALKPHO 126 05/30/2025    BILT <0.2 05/30/2025    TP 6.5 05/30/2025    AST 36 05/30/2025    ALT 31 05/30/2025    INR 1.02 05/30/2025    MG 2.2 05/30/2025    PHOS 3.5 05/30/2025       Urinalysis Results (last three years):  Recent Labs     01/22/24  0050 02/20/24  120  03/05/24  1321 07/23/24  1120 09/03/24  1503 12/30/24  1839 03/18/25  1443 03/18/25  1509 05/29/25  1820   COLORUR Yellow  --   --   --   --  Yellow  --   --  Dark-Yellow   CLARITY Turbid*  --   --   --   --  Turbid*  --   --  Turbid*   SPECGRAVITY 1.020 1.025 1.025 1.020 1.020 1.014 1.015  --  1.025   PHURINE 6.5 6.0 7.0 6.5 7.0 7.5 7.0  --  7.0   PROUR 50*  --   --   --   --  20*  --   --  >=300*   GLUUR Normal  --   --   --   --  Normal  --   --  Negative   KETUR Negative  --   --   --   --  Negative  --   --  Trace*   BILUR Negative  --   --   --   --  Negative  --   --   --    BLOODURINE Negative  --   --   --   --  Trace*  --   --  Moderate*   NITRITE 2+* Negative Negative Negative Negative Negative Negative  --  Negative   UROBILINOGEN 3*  --   --   --   --  Normal  --   --  1.0*   LEUUR 500*  --   --   --   --  250*  --   --  Large*   WBCUR >50*  --   --   --   --  >50*  --  21-50* >50*  >50*   RBCUR 3-5*  --   --   --   --  6-10*  --  3-5* None Seen  None Seen   BACUR 3+*  --   --   --   --  1+*  --  None Seen 3+*  3+*       Urine Culture Results (last three years):  Lab Results   Component Value Date    URINECUL >100,000 CFU/ML Proteus mirabilis (A) 03/18/2025    URINECUL  03/05/2024     >100,000 cfu/ml Multiple species present- probable contamination.    URINECUL No Growth 2 Days 02/20/2024    URINECUL >100,000 CFU/ML Escherichia coli (A) 01/22/2024    URINECUL >100,000 CFU/ML Proteus mirabilis (A) 01/22/2024    URINECUL >100,000 CFU/ML Escherichia coli (A) 01/22/2024       Imaging  CT ABDOMEN+PELVIS(CPT=74176)  Result Date: 5/29/2025  PROCEDURE:  CT ABDOMEN+PELVIS (CPT=74176)  COMPARISON:  [facility] , CT, CT ABDOMEN+PELVIS KIDNEYSTONE 2D RNDR(NO IV,NO ORAL)(CPT=74176), 12/30/2024, 8:45 PM.  INDICATIONS:  r flank pain  TECHNIQUE:  Unenhanced multislice CT scanning was performed from the dome of the diaphragm to the pubic symphysis.  Dose reduction techniques were used. Dose information is transmitted to  the ACR (American College of Radiology) NRDR (National Radiology Data Registry) which includes the Dose Index Registry.  PATIENT STATED HISTORY: (As transcribed by Technologist)  Patien with right back pain.    FINDINGS:  LUNG BASE:  Atelectasis. LIVER:  Homogeneous attenuation. BILIARY:  No biliary ductal dilatation. PANCREAS:  Homogeneous attenuation. SPLEEN:  Normal caliber. KIDNEYS:  There is perinephric stranding about both kidneys right greater than left.  Bilateral nephrolithiasis.  Slight malrotation of the right kidney.  Mild cortical atrophy of the left kidney.  Both kidneys are negative for hydronephrosis. ADRENALS:  Normal. AORTA/VASCULAR:  No aneurysm.  Dense aortoiliac calcification. RETROPERITONEUM:  No enlarged adenopathy. BOWEL/MESENTERY:  Normal caliber appendix. Uncomplicated colonic diverticulosis ABDOMINAL WALL:  Fat containing right inguinal hernia.  Postsurgical changes umbilical hernia repair PELVIC ORGANS:  Wall thickening of the urinary bladder may be accentuated by incomplete distension.  BONES:  Mild degenerative changes in the lower lumbar facets.             CONCLUSION:  Bilateral nonobstructive nephrolithiasis.  Perinephric stranding about both kidneys right greater than left.  This is nonspecific although may be seen with pyelonephritis in the appropriate clinical setting.  Cortical atrophy left kidney.  Colonic diverticulosis.  Fat containing right inguinal hernia.    LOCATION:  [facility]   Dictated by (CST): Paula Silverman MD on 5/29/2025 at 7:12 PM     Finalized by (CST): Paula Silverman MD on 5/29/2025 at 7:17 PM             Impression:  Problem List[6]    55 year old female with hx of preDM, nephrolithiasis, who presented to the ED today for rigors and bilateral flank pain. She was afebrile but hypotensive upon presentation. Labs showed white count of 27.1 with left shift, lactate 1.7, and, sodium 135, and serum creatinine 6.20. UA abnormal. CT scan with bilateral perinephric  stranding and bilateral non obstructing renal stones. She was started on IV abx, fluid bolus and admitted for further evaluation and mgmt. Today preliminary blood cultures +Proteus. Scr 5.09 and WBC 21.4. Repeat blood cultures drawn. She remains on Rocephin.    Urology consulted regarding CT scan findings.     Patient followed by Dr. Kip Ann for nephrolithiasis. Originally seen in January 2024 when she presented to the ED with obstructing stone and infection. She had ureteral stent placed by Dr. Jason Pena and subsequently transitioned care to [place] due to insurance changes. Repeat CT scan showed complete resolution of hydronephrosis, less parenchymal thinning. MAG3 renal scan showed 25% function of the left kidney and 75% function of the right. Post lasix left T1/2 was 26. Given reasonable function she was given option of PCNL. She was taken for PCNL, cystolitholapaxy of large distal stent coil stone, 7/10/24. Post operatively CT scan showed 8mm left lower stone fragment so she was brought back to the OR for left ureteroscopy 7/16/24. Stent later removed 7/23/24. Of note, stone composition 100% struvite.    Repeat imaging in December 2024 showed punctate left renal stone and 2 and 3mm non obstructing RLP stones. She has been focusing on liberal water intake for stone prevention. No recent UTIs.     Reviewed CT scan images with patient. Significant development of stone burden since last CT scan in December. We discussed given prior stone 100% struvite importance of UTI prevention. At this time no indication for surgical intervention. Could consider ureteral stent placement if she fails to clinically improve with anticipation that she will need future treatment of right renal stones. Discussed consideration for malave placement given positive cultures, she is agreeable.     Recommendations:  Malave placement for maximum decompression of bladder, VT prior to discharge.   No surgical intervention indicated at this  time.   Await final cultures, abx per primary  Trend Scr and monitor UOP, nephrology following  Outpatient follow up with Dr. Ann to discuss treatment of new right renal stone burden.     We will follow peripherally. Please contact Dr. Mike Singer with any concerns over the weekend.     Thank you for allowing me to participate in the care of your patient.    Linda Sommer PA-C  Located within [facility]  2025           [1]   Past Medical History:   Calculus of kidney    Prediabetes    Renal disorder    Visual impairment    GLASSES   [2]   Past Surgical History:  Procedure Laterality Date          Cystoscopy,insert ureteral stent      7/10/24 LEFT percutaneous nephrolithotomy    Hernia surgery            Other surgical history  2024    kidney stone    Other surgical history  04/15/2024    stent exchange    Other surgical history  07/10/2024    NEPHROSTOMY TUBE IN  PLACE    Repair ing hernia,3+y/o,reducibl      Ventral hernia repair N/A 2016    Procedure: HERNIA VENTRAL REPAIR;  Surgeon: Vijay Randall MD;  Location:  MAIN OR   [3]   Family History  Problem Relation Age of Onset    Cancer Mother     Heart Disease Maternal Grandfather    [4] No Known Allergies  [5]   Current Facility-Administered Medications:     benzonatate (Tessalon) cap 100 mg, 100 mg, Oral, TID PRN    cefTRIAXone (Rocephin) 2 g in sodium chloride 0.9% 100 mL IVPB-ADDV, 2 g, Intravenous, Q24H    sodium chloride 0.9% infusion, , Intravenous, Continuous    heparin (Porcine) 5000 UNIT/ML injection 5,000 Units, 5,000 Units, Subcutaneous, Q8H DAVIN    acetaminophen (Tylenol Extra Strength) tab 500 mg, 500 mg, Oral, Q4H PRN    oxyCODONE immediate release tab 5 mg, 5 mg, Oral, Q4H PRN    melatonin tab 3 mg, 3 mg, Oral, Nightly PRN    polyethylene glycol (PEG 3350) (Miralax) 17 g oral packet 17 g, 17 g, Oral, Daily PRN    sennosides (Senokot) tab 17.2 mg, 17.2 mg, Oral, Nightly PRN    bisacodyl (Dulcolax) 10 MG rectal  suppository 10 mg, 10 mg, Rectal, Daily PRN    ondansetron (Zofran) 4 MG/2ML injection 4 mg, 4 mg, Intravenous, Q6H PRN    prochlorperazine (Compazine) 10 MG/2ML injection 5 mg, 5 mg, Intravenous, Q8H PRN    nicotine (Nicoderm CQ) 21 MG/24HR patch 1 patch, 1 patch, Transdermal, Daily  [6]   Patient Active Problem List  Diagnosis    Ventral hernia without obstruction or gangrene    Incarcerated ventral hernia    Pyelonephritis    Leukocytosis, unspecified type    Nephrolithiasis    Hydronephrosis with urinary obstruction due to renal calculus    Bladder stone    Type 2 diabetes mellitus without complication, without long-term current use of insulin (HCC)    JORGE LUIS (acute kidney injury)

## 2025-05-30 NOTE — PAYOR COMM NOTE
--------------  ADMISSION REVIEW     Payor: [employer]  Subscriber #:  [id number]  Authorization Number: [id number]    Admit date: 5/29/25  Admit time: 10:54 PM       REVIEW DOCUMENTATION:        IV Antibiotics  IV Fluids            #Severe sepsis 2/2 pyelonephritis   #Hypotension - fluid responsive  -Follow cultures  -Empiric antibiotics   -Supportive care     #JORGE LUIS   -IVF. Trend. Nephrology consult      #Non-obstructive nephrolithiasis   #Pre-diabetes  #Morbid obesity, BMI 41.35              Rigors, fevers, nausea     Kirstin Myers is a 55 year old female with history of prediabetes, nephrolithiasis, obesity (BMI 41.35) presented with fevers, nausea, inability to tolerate oral intake.      Patient reports 1 week history of rigors and R > L flank pain. She has associated nausea and poor intake. She presented today because of persistent rigors.      He was hypotensive to systolic 70s in ED which responded to fluids. Blood work concerning for acute renal failure  with Scr 6.2, BUN 55. WBC 27.1. UA c/f UTI with > 50 WBC, large LE, moderate blood. CT AP with b/l nonobstructive nephrolithiasis, perinephric stranding about both kidneys R > L. She was given rocephin and IVF boluses and admitted.          ED Provider Notes        ED Provider Notes signed by Chaitanya Hodges MD at 5/29/2025  8:55 PM       Author: Chaitanya Hodges MD Service: Emergency Medicine Author Type: Physician    Filed: 5/29/2025  8:55 PM Date of Service: 5/29/2025  5:05 PM Status: Signed    : Chaitanya Hodges MD (Physician)           Patient Seen in: [facility] Emergency Department        History  Chief Complaint   Patient presents with    Headache    Fever    Nausea/Vomiting/Diarrhea     Stated Complaint: HA, nausea, decreased appetite    Subjective:   HPI            55-year-old female history of staghorn calculus presents with chills, intermittent fevers.  Ongoing the last approximately 3 days.  Fever running 100-100.7F at home.  Had some  right flank pain initially but that has resolved.  No hematuria.  No dysuria.  Some nausea.  No abdominal pain.  No cough or shortness of breath.      Objective:     Past Medical History:    Calculus of kidney    Prediabetes    Visual impairment    GLASSES              Past Surgical History:   Procedure Laterality Date          Cystoscopy,insert ureteral stent      7/10/24 LEFT percutaneous nephrolithotomy    Hernia surgery            Other surgical history  2024    kidney stone    Other surgical history  04/15/2024    stent exchange    Other surgical history  07/10/2024    NEPHROSTOMY TUBE IN  PLACE    Repair ing hernia,5+y/o,reducibl      Ventral hernia repair N/A 2016    Procedure: HERNIA VENTRAL REPAIR;  Surgeon: Vijay Randall MD;  Location:  MAIN OR                Social History     Socioeconomic History    Marital status:    Tobacco Use    Smoking status: Every Day     Current packs/day: 1.00     Average packs/day: 1 pack/day for 30.0 years (30.0 ttl pk-yrs)     Types: Cigarettes     Passive exposure: Current   Vaping Use    Vaping status: Former   Substance and Sexual Activity    Alcohol use: No     Alcohol/week: 0.0 standard drinks of alcohol    Drug use: No     Social Drivers of Health     Food Insecurity: No Food Insecurity (7/10/2024)    Food Insecurity     Food Insecurity: Never true   Transportation Needs: No Transportation Needs (7/10/2024)    Transportation Needs     Lack of Transportation: No   Housing Stability: Low Risk  (7/10/2024)    Housing Stability     Housing Instability: No                                Physical Exam    ED Triage Vitals [25 1600]   BP 95/62   Pulse 91   Resp 20   Temp 98.3 °F (36.8 °C)   Temp src Oral   SpO2 93 %   O2 Device None (Room air)       Current Vitals:   Vital Signs  BP: 112/66  Pulse: 75  Resp: 16  Temp: 98.2 °F (36.8 °C)  Temp src: Oral  MAP (mmHg): 79    Oxygen Therapy  SpO2: 96 %  O2 Device: None (Room  air)            Physical Exam  Constitutional:       General: Is not in acute distress.     Appearance: Is not ill-appearing.   HENT:      Head: Normocephalic and atraumatic.      Mouth/Throat:      Mouth: Mucous membranes are moist.   Eyes:      Conjunctiva/sclera: Conjunctivae normal.      Pupils: Pupils are equal, round, and reactive to light.   Cardiovascular:      Rate and Rhythm: Normal rate and regular rhythm.   Pulmonary:      Effort: Pulmonary effort is normal. No respiratory distress.      Breath sounds: Normal breath sounds.   Abdominal:      General: Abdomen is flat. There is no distension.      Tenderness: There is no abdominal tenderness.   Musculoskeletal:      Right lower leg: No edema.      Left lower leg: No edema.   Skin:     General: Skin is warm and dry.   Neurological:      General: No focal deficit present.      Mental Status: Is alert.           ED Course  Labs Reviewed   COMP METABOLIC PANEL (14) - Abnormal; Notable for the following components:       Result Value    Glucose 141 (*)     Sodium 135 (*)     Chloride 97 (*)     BUN 55 (*)     Creatinine 6.20 (*)     Calculated Osmolality 297 (*)     eGFR-Cr 7 (*)     Alkaline Phosphatase 134 (*)     All other components within normal limits   CBC WITH DIFFERENTIAL WITH PLATELET - Abnormal; Notable for the following components:    WBC 27.1 (*)     Neutrophil Absolute Prelim 25.03 (*)     All other components within normal limits   URINALYSIS WITH CULTURE REFLEX - Abnormal; Notable for the following components:    Clarity Urine Turbid (*)     Ketones Urine Trace (*)     Blood Urine Moderate (*)     Protein Urine >=300 (*)     Urobilinogen Urine 1.0 (*)     Leukocyte Esterase Urine Large (*)     WBC Urine >50 (*)     Bacteria Urine 3+ (*)     All other components within normal limits   MANUAL DIFFERENTIAL - Abnormal; Notable for the following components:    Neutrophil Absolute Manual 24.93 (*)     All other components within normal limits   UA  MICROSCOPIC ONLY, URINE - Abnormal; Notable for the following components:    WBC Urine >50 (*)     Bacteria Urine 3+ (*)     All other components within normal limits   POCT GLUCOSE - Abnormal; Notable for the following components:    POC Glucose 127 (*)     All other components within normal limits   LACTIC ACID, PLASMA - Normal   ICTOTEST - Normal   SARS-COV-2/FLU A AND B/RSV BY PCR (GENEXPERT) - Normal    Narrative:     This test is intended for the qualitative detection and differentiation of SARS-CoV-2, influenza A, influenza B, and respiratory syncytial virus (RSV) viral RNA in nasopharyngeal or nares swabs from individuals suspected of respiratory viral infection consistent with COVID-19 by their healthcare provider. Signs and symptoms of respiratory viral infection due to SARS-CoV-2, influenza, and RSV can be similar.    Test performed using the Xpert Xpress SARS-CoV-2/FLU/RSV (real time RT-PCR)  assay on the [employer] instrument, [employer], [place] [address].   This test is being used under the Food and Drug Administration's Emergency Use Authorization.    The authorized Fact Sheet for Healthcare Providers for this assay is available upon request from the laboratory.   SCAN SLIDE   BLOOD CULTURE   BLOOD CULTURE   URINE CULTURE, ROUTINE     EKG    Rate, intervals and axes as noted on EKG Report.  Rate: 84  Rhythm: Normal sinus  Reading: No ST elevation or depression                               MDM     Considered all emergent etiologies, differential includes but is not limited to: Kidney stone, pyelonephritis, UTI, musculoskeletal     I have independently visualized the radiology images, my focus/limited interpretation: CT scan no obstructing stones, bilateral perinephric stranding     Defer to radiologist for other/incidental findings    Labs notable for marked leukocytosis as well as JORGE LUIS.  Urine is consistent with infection.  Lactic is within normal limits.  Patient with hypotensive and responsive to  fluids in the ER.  30 cc/kg bolus given based on ideal body weight and IV fluids started at a rate.  Started on ceftriaxone based on previous cultures.  On reassessment patient feeling markedly improved, blood pressure stable and normal perfusion parameters.  Discussed with Dr. Nicholas for admission.     Admission disposition: 5/29/2025  8:53 PM           Medical Decision Making      Disposition and Plan     Clinical Impression:  1. JORGE LUIS (acute kidney injury)    2. Pyelonephritis         Disposition:  Admit  5/29/2025  8:53 pm    Follow-up:  No follow-up provider specified.        Medications Prescribed:  Current Discharge Medication List                Supplementary Documentation:         Hospital Problems       Present on Admission  Date Reviewed: 3/18/2025          ICD-10-CM Noted POA    * (Principal) JORGE LUIS (acute kidney injury) N17.9 8/11/2024 Unknown                                                                Signed by Chaitanya Hodges MD on 5/29/2025  8:55 PM         MEDICATIONS ADMINISTERED IN LAST 1 DAY:  acetaminophen (Tylenol Extra Strength) tab 500 mg       Date Action Dose Route User    5/30/2025 0211 Given 500 mg Oral Liat Encarnacion RN          benzonatate (Tessalon) cap 100 mg       Date Action Dose Route User    5/30/2025 0932 Given 100 mg Oral Tania Hollis RN          cefTRIAXone (Rocephin) 2 g in sodium chloride 0.9% 100 mL IVPB-ADDV       Date Action Dose Route User    5/29/2025 1848 New Bag 2 g Intravenous Agustin Ravi RN          heparin (Porcine) 5000 UNIT/ML injection 5,000 Units       Date Action Dose Route User    5/30/2025 0932 Given 5,000 Units Subcutaneous (Left Lower Abdomen) Tania Hollis RN    5/30/2025 0032 Given 5,000 Units Subcutaneous (Left Lower Abdomen) Liat Encarnacion RN          nicotine (Nicoderm CQ) 21 MG/24HR patch 1 patch       Date Action Dose Route User    5/30/2025 0933 Patch Applied 1 patch Transdermal (Right Upper Arm) Tania Hollis RN    5/29/2025  2355 Patch Applied 1 patch Transdermal (Left Upper Back) Liat Encarnacion RN          sodium chloride 0.9% infusion       Date Action Dose Route User    5/29/2025 2107 New Bag 125 mL/hr Intravenous Daphne Koo RN          sodium chloride 0.9% infusion       Date Action Dose Route User    5/30/2025 0032 New Bag (none) Intravenous Liat Encarnacion RN          sodium chloride 0.9 % IV bolus 1,000 mL       Date Action Dose Route User    5/29/2025 1731 New Bag 1,000 mL Intravenous Agustin Ravi RN          sodium chloride 0.9 % IV bolus 1,000 mL       Date Action Dose Route User    5/29/2025 1935 New Bag 1,000 mL Intravenous Daphne Koo RN            Vitals (last day)       Date/Time Temp Pulse Resp BP SpO2 Weight O2 Device O2 Flow Rate (L/min) [facility]    05/30/25 0729 98.4 °F (36.9 °C) 81 18 102/59 92 % -- None (Room air) 0 L/min     05/30/25 0440 98.7 °F (37.1 °C) 81 17 92/68 93 % -- None (Room air) -- AW    05/29/25 2304 -- -- -- -- -- 280 lb (127 kg) -- -- TC    05/29/25 2304 98.8 °F (37.1 °C) 81 19 99/49 -- -- -- -- AW    05/29/25 2230 -- 78 19 97/65 93 % -- None (Room air) -- KJ    05/29/25 2200 -- 83 25 107/69 93 % -- None (Room air) -- KJ    05/29/25 2145 -- 81 24 102/55 94 % -- None (Room air) -- KJ    05/29/25 2115 -- 77 17 110/64 93 % -- None (Room air) -- KJ    05/29/25 2100 -- 73 25 120/73 93 % -- None (Room air) -- KJ    05/29/25 2045 -- 75 16 112/66 96 % -- None (Room air) -- KJ    05/29/25 2030 -- 75 21 102/60 97 % -- None (Room air) -- KJ    05/29/25 2017 -- 73 22 85/55 98 % -- None (Room air) -- KJ 05/29/25 2000 -- 75 20 98/61 96 % -- None (Room air) -- KJ 05/29/25 1952 -- 67 13 99/60 96 % -- None (Room air) -- KJ 05/29/25 1946 -- 72 17 88/58 95 % -- None (Room air) -- KJ 05/29/25 1945 -- 68 20 78/59 93 % -- None (Room air) -- KJ 05/29/25 1944 -- 68 22 78/59 94 % -- -- -- KJ 05/29/25 1936 98.2 °F (36.8 °C) 82 20 102/68 96 % -- None (Room air) -- KJ 05/29/25 1929 -- 85 21  73/46 94 % -- -- -- KJ    05/29/25 1928 -- 74 23 76/52 93 % -- -- -- KJ    05/29/25 1846 -- 87 20 98/62 94 % -- None (Room air) -- LM    05/29/25 1708 -- -- -- -- -- -- None (Room air) -- LM    05/29/25 1600 98.3 °F (36.8 °C) 91 20 95/62 93 % 280 lb (127 kg) None (Room air) -- RC          CIWA Scores (since admission)       None

## 2025-05-30 NOTE — PROGRESS NOTES
[facility]   part of [facility]     Hospitalist Progress Note     Kirstin Myers Patient Status:  Inpatient    3/24/1970 MRN [id number]   Location [facility] 5NW-A Attending Karen Nicholas MD   Hosp Day # 1 PCP None Pcp     Chief Complaint: rigors    Subjective:     Patient feeling much better    Objective:    Review of Systems:   A comprehensive review of systems was completed; pertinent positive and negatives stated in subjective.    Vital signs:  Temp:  [98.1 °F (36.7 °C)-98.8 °F (37.1 °C)] 98.1 °F (36.7 °C)  Pulse:  [67-91] 80  Resp:  [13-25] 18  BP: ()/(46-73) 113/69  SpO2:  [92 %-98 %] 94 %    Physical Exam:    General: No acute distress  Respiratory: No wheezes, no rhonchi  Cardiovascular: S1, S2, regular rate and rhythm  Abdomen: Soft, Non-tender, non-distended, positive bowel sounds  Neuro: No new focal deficits.   Extremities: No edema    Diagnostic Data:    Labs:  Recent Labs   Lab 25  1628 25  0723   WBC 27.1* 21.4*   HGB 12.6 11.9*   MCV 89.2 87.3   .0 236.0   BAND 10  --    INR  --  1.02       Recent Labs   Lab 25  1628 25  0723   * 142*   BUN 55* 64*   CREATSERUM 6.20* 5.09*   CA 10.5 9.3   ALB 3.8 3.5   * 137   K 4.8 4.1   CL 97* 102   CO2 23.0 23.0   ALKPHO 134* 126*   AST 28 36*   ALT 25 31   BILT 0.3 <0.2*   TP 7.1 6.5       Estimated Glomerular Filtration Rate: 9 mL/min/1.73m2 (A) (result from lab).    No results for input(s): \"TROP\", \"TROPHS\", \"CK\" in the last 168 hours.    Recent Labs   Lab 25  0723   PTP 13.6   INR 1.02                  Microbiology    Hospital Encounter on 25   1. Blood Culture     Status: Abnormal (Preliminary result)    Collection Time: 25  5:22 PM    Specimen: Blood,peripheral   Result Value Ref Range    Blood Culture Result Positive N/A    Blood Smear Positive Blood Culture (A) N/A    Blood Smear Gram Negative Rods (A) N/A         Imaging: Reviewed in Epic.    Medications:     cefTRIAXone  2 g Intravenous Q24H    heparin  5,000 Units Subcutaneous Q8H DAVIN    nicotine  1 patch Transdermal Daily       Assessment & Plan:      #Severe sepsis 2/2 pyelonephritis   #Hypotension - fluid responsive  #septicemia -proteus. No ESBL  #hx staghorn calculus and recurrent stones causing UTI w/ hx stone extraction  -await cultures  -repeat bcx  -cont antibiotics,   -Supportive care  -IVF  -longterm pt of dr molina w/ urology, will place on consult     #JORGE LUIS   -IVF. Trend. Nephrology consult      #Non-obstructive nephrolithiasis   #Pre-diabetes  #Morbid obesity, BMI 41.35      Danny Henson MD    Supplementary Documentation:     Quality:  DVT Mechanical Prophylaxis:     Early ambuation  DVT Pharmacologic Prophylaxis   Medication    heparin (Porcine) 5000 UNIT/ML injection 5,000 Units                Code Status: Prior  Malave: No urinary catheter in place  Malave Duration (in days):   Central line:    ALIX:     Discharge is dependent on: course  At this point Ms. Myers is expected to be discharge to: home    The 21st Century Cures Act makes medical notes like these available to patients in the interest of transparency. Please be advised this is a medical document. Medical documents are intended to carry relevant information, facts as evident, and the clinical opinion of the practitioner. The medical note is intended as peer to peer communication and may appear blunt or direct. It is written in medical language and may contain abbreviations or verbiage that are unfamiliar.                   Cryotherapy Text: The wound bed was treated with cryotherapy after the biopsy was performed.

## 2025-05-30 NOTE — ED QUICK NOTES
Pt ambulated to bathroom. Strong, steady gait. Reports feeling much better. Hospitalist at bedside.

## 2025-05-30 NOTE — CONSULTS
[facility]  Report of Consultation    Kirstin Myers Patient Status:  Inpatient    3/24/1970 MRN [id number]   Location [facility] 5NW-A Attending Karen Nicholas MD   Hosp Day # 1 PCP None Pcp     Reason for Consultation:  JORGE LUIS    History of Present Illness:  Kirstin Myers is a a(n) 55 year old woman with hx staghorn calculus s/p lithotripsy, most recent creat in  1.25 mg/dl who presented to ED with c/o fever, nausea, poor intake.  Creat 6.2 mg/dl.  In ED BP was into the 70s systolic.  Imaging with B nonobstructing stones.  Blood cultues + for proteus species.  IVF and abx were started.  Creat is 5 mg/dl this AM    History:  Past Medical History[1]  Past Surgical History[2]  Family History[3]   reports that she has been smoking cigarettes. She has a 30 pack-year smoking history. She has been exposed to tobacco smoke. She does not have any smokeless tobacco history on file. She reports that she does not drink alcohol and does not use drugs.    Allergies:  Allergies[4]    Medications:  Current Hospital Medications[5]  Prior to Admission Medications[6]    Review of Systems:  Denies fever/chills  Denies wt loss/gain  Denies HA or visual changes  Denies CP or palpitations  Denies SOB/cough/hemoptysis  + R flank pain  + nausea  Denies change in urinary habits or gross hematuria  Denies LE edema  Denies skin rashes/myalgias/arthralgias      Physical Exam:   /69 (BP Location: Left arm)   Pulse 80   Temp 98.1 °F (36.7 °C) (Oral)   Resp 18   Ht 5' 9\" (1.753 m)   Wt 280 lb (127 kg)   LMP 2023 (Approximate)   SpO2 94%   BMI 41.35 kg/m²   Temp (24hrs), Av.4 °F (36.9 °C), Min:98.1 °F (36.7 °C), Max:98.8 °F (37.1 °C)       Intake/Output Summary (Last 24 hours) at 2025 1142  Last data filed at 2025  Gross per 24 hour   Intake 3000 ml   Output --   Net 3000 ml     Last 3 Weights   25 2304 280 lb (127 kg)   25 1600 280 lb (127 kg)   24 1747 275 lb (124.7  kg)   08/06/24 1259 292 lb 12.8 oz (132.8 kg)   07/31/24 1459 287 lb 11.2 oz (130.5 kg)   07/16/24 0603 291 lb (132 kg)   07/12/24 1147 293 lb (132.9 kg)     General: Alert and oriented in no apparent distress.  HEENT: No scleral icterus, MMM  Neck: Supple, no CHAD or thyromegaly  Cardiac: Regular rate and rhythm, S1, S2 normal, no murmur or rub  Lungs: Clear without wheezes, rales, rhonchi.    Abdomen: Soft, non-tender. + bowel sounds, no palpable organomegaly  Extremities: Without clubbing, cyanosis or edema.  Neurologic: Alert and oriented, cranial nerves grossly intact, moving all extremities  Skin: Warm and dry, no rashes      Laboratory Data:  Lab Results   Component Value Date    WBC 21.4 05/30/2025    HGB 11.9 05/30/2025    HCT 35.2 05/30/2025    .0 05/30/2025    CREATSERUM 5.09 05/30/2025    BUN 64 05/30/2025     05/30/2025    K 4.1 05/30/2025     05/30/2025    CO2 23.0 05/30/2025     05/30/2025    CA 9.3 05/30/2025    ALB 3.5 05/30/2025    ALKPHO 126 05/30/2025    BILT <0.2 05/30/2025    TP 6.5 05/30/2025    AST 36 05/30/2025    ALT 31 05/30/2025    INR 1.02 05/30/2025    PTP 13.6 05/30/2025    MG 2.2 05/30/2025    PHOS 3.5 05/30/2025    PGLU 127 05/29/2025       BUN (mg/dL)   Date Value   05/30/2025 64 (H)   05/29/2025 55 (H)   12/30/2024 11     UREA NITROGEN (BUN) (mg/dL)   Date Value   04/11/2024 25   02/20/2024 15     CREATININE (mg/dL)   Date Value   04/11/2024 1.20 (H)   02/20/2024 1.01     Creatinine (mg/dL)   Date Value   05/30/2025 5.09 (H)   05/29/2025 6.20 (H)   12/30/2024 1.25 (H)       No results found for: \"MICROALBCREA\"    Recent Labs   Lab 05/29/25  1628 05/30/25  0723   WBC 27.1* 21.4*   HGB 12.6 11.9*   MCV 89.2 87.3   .0 236.0   BAND 10  --    INR  --  1.02       Recent Labs   Lab 05/29/25  1628 05/30/25  0723   * 137   K 4.8 4.1   CL 97* 102   CO2 23.0 23.0   BUN 55* 64*   CREATSERUM 6.20* 5.09*   CA 10.5 9.3   MG  --  2.2   PHOS  --  3.5   *  142*       Recent Labs   Lab 25  1628 25  0723   ALT 25 31   AST 28 36*   ALB 3.8 3.5       Recent Labs   Lab 25  1904   PGLU 127*           Imaging:  CT abd noted    Impression/Plan:    #1.  JORGE LUIS- clinical course c/w prerenal azotemia with poor intake/nausea and decr renal perfusion with hypotension/septic shock.  Renal fxn better today.  No obstruction on CT.  Cont IVF    #2.  Proteus sepsis- appears to be due to urosepsis.  No obstructing stones.  On abx.  BP improved     Thank you for allowing me to participate in the care of your patient. Please do not hesitate to call with any questions or concerns.       Vijay Murphy MD  2025  11:42 AM         [1]   Past Medical History:   Calculus of kidney    Prediabetes    Renal disorder    Visual impairment    GLASSES   [2]   Past Surgical History:  Procedure Laterality Date          Cystoscopy,insert ureteral stent      7/10/24 LEFT percutaneous nephrolithotomy    Hernia surgery            Other surgical history  2024    kidney stone    Other surgical history  04/15/2024    stent exchange    Other surgical history  07/10/2024    NEPHROSTOMY TUBE IN  PLACE    Repair ing hernia,5+y/o,reducibl      Ventral hernia repair N/A 2016    Procedure: HERNIA VENTRAL REPAIR;  Surgeon: Vijay Randall MD;  Location:  MAIN OR   [3]   Family History  Problem Relation Age of Onset    Cancer Mother     Heart Disease Maternal Grandfather    [4] No Known Allergies  [5]   Current Facility-Administered Medications:     benzonatate (Tessalon) cap 100 mg, 100 mg, Oral, TID PRN    cefTRIAXone (Rocephin) 2 g in sodium chloride 0.9% 100 mL IVPB-ADDV, 2 g, Intravenous, Q24H    sodium chloride 0.9% infusion, , Intravenous, Continuous    heparin (Porcine) 5000 UNIT/ML injection 5,000 Units, 5,000 Units, Subcutaneous, Q8H DAVIN    acetaminophen (Tylenol Extra Strength) tab 500 mg, 500 mg, Oral, Q4H PRN    oxyCODONE immediate release tab 5 mg, 5 mg, Oral,  Q4H PRN    melatonin tab 3 mg, 3 mg, Oral, Nightly PRN    polyethylene glycol (PEG 3350) (Miralax) 17 g oral packet 17 g, 17 g, Oral, Daily PRN    sennosides (Senokot) tab 17.2 mg, 17.2 mg, Oral, Nightly PRN    bisacodyl (Dulcolax) 10 MG rectal suppository 10 mg, 10 mg, Rectal, Daily PRN    ondansetron (Zofran) 4 MG/2ML injection 4 mg, 4 mg, Intravenous, Q6H PRN    prochlorperazine (Compazine) 10 MG/2ML injection 5 mg, 5 mg, Intravenous, Q8H PRN    nicotine (Nicoderm CQ) 21 MG/24HR patch 1 patch, 1 patch, Transdermal, Daily  [6]   No current outpatient medications on file.

## 2025-05-30 NOTE — ED QUICK NOTES
Orders for admission, patient is aware of plan and ready to go upstairs. Any questions, please call ED RN Daphne at extension [phone].     Patient Covid vaccination status: Unvaccinated     COVID Test Ordered in ED: SARS-CoV-2/Flu A and B/RSV by PCR (GeneXpert)    COVID Suspicion at Admission: N/A    Running Infusions: Medication Infusions[1]     Mental Status/LOC at time of transport: A&Ox4    Other pertinent information: Daughter at bedside. All consults aware.    CIWA score: N/A   NIH score:  N/A             [1]    sodium chloride 125 mL/hr (05/29/25 1647)

## 2025-05-30 NOTE — H&P
[facility]     Kirstin Myers Patient Status:  Emergency    3/24/1970 MRN [id number]   Location [facility] EMERGENCY DEPARTMENT Attending Chaitanya Hodges MD   Hosp Day # 0 PCP None Pcp     Chief Complaint: Rigors, fevers, nausea    Subjective:    History of Present Illness:     Kirstin Myers is a 55 year old female with history of prediabetes, nephrolithiasis, obesity (BMI 41.35) presented with fevers, nausea, inability to tolerate oral intake.     Patient reports 1 week history of rigors and R > L flank pain. She has associated nausea and poor intake. She presented today because of persistent rigors.     He was hypotensive to systolic 70s in ED which responded to fluids. Blood work concerning for acute renal failure  with Scr 6.2, BUN 55. WBC 27.1. UA c/f UTI with > 50 WBC, large LE, moderate blood. CT AP with b/l nonobstructive nephrolithiasis, perinephric stranding about both kidneys R > L. She was given rocephin and IVF boluses and admitted.     History/Other:    Past Medical History:  Past Medical History:    Calculus of kidney    Prediabetes    Visual impairment    GLASSES     Past Surgical History:   Past Surgical History[1]   Family History:   Family History[2]  Social History:    reports that she has been smoking cigarettes. She has a 30 pack-year smoking history. She has been exposed to tobacco smoke. She does not have any smokeless tobacco history on file. She reports that she does not drink alcohol and does not use drugs.     Allergies: Allergies[3]    Medications:  Medications Ordered Prior to Encounter[4]    Review of Systems:   A comprehensive review of systems was completed.    Pertinent positives and negatives noted in the HPI.    Objective:   Physical Exam:    /73   Pulse 73   Temp 98.2 °F (36.8 °C) (Oral)   Resp 25   Ht 5' 9\" (1.753 m)   Wt 280 lb (127 kg)   LMP 2023 (Approximate)   SpO2 93%   BMI 41.35 kg/m²   General: No acute  distress, Alert  Respiratory: No rhonchi, no wheezes  Cardiovascular: S1, S2. Regular rate and rhythm  Abdomen: Soft, R CVA tenderness, non-distended, positive bowel sounds  Neuro: No new focal deficits  Extremities: No edema    Results:    Labs:      Labs Last 24 Hours:    Recent Labs   Lab 05/29/25  1628   RBC 4.25   HGB 12.6   HCT 37.9   MCV 89.2   MCH 29.6   MCHC 33.2   RDW 14.7   NEPRELIM 25.03*   WBC 27.1*   .0       Recent Labs   Lab 05/29/25  1628   *   BUN 55*   CREATSERUM 6.20*   EGFRCR 7*   CA 10.5   ALB 3.8   *   K 4.8   CL 97*   CO2 23.0   ALKPHO 134*   AST 28   ALT 25   BILT 0.3   TP 7.1       Estimated Glomerular Filtration Rate: 7 mL/min/1.73m2 (A) (result from lab).    Lab Results   Component Value Date    PT 10.0 07/05/2024    PT 9.8 05/17/2024    INR 0.9 07/05/2024    INR 0.9 05/17/2024       No results for input(s): \"TROP\", \"TROPHS\", \"CK\" in the last 168 hours.    No results for input(s): \"TROP\", \"PBNP\" in the last 168 hours.    No results for input(s): \"PCT\" in the last 168 hours.    Imaging: Imaging data reviewed in Epic.    Assessment & Plan:      #Severe sepsis 2/2 pyelonephritis   #Hypotension - fluid responsive  Patient did not receive 30ml/kg of fluids despite having: hypotension. The reason for doing so is: patient's blood pressure responded to a lesser volume. 2000 mL of fluids were given instead (2 L is the amount of fluids given).   -Follow cultures  -Empiric antibiotics   -Supportive care    #JORGE LUIS   -IVF. Trend. Nephrology consult     #Non-obstructive nephrolithiasis   #Pre-diabetes  #Morbid obesity, BMI 41.35    Plan of care discussed with patient    Karen Nicholas MD    Supplementary Documentation:     The 21st Century Cures Act makes medical notes like these available to patients in the interest of transparency. Please be advised this is a medical document. Medical documents are intended to carry relevant information, facts as evident, and the clinical opinion of  the practitioner. The medical note is intended as peer to peer communication and may appear blunt or direct. It is written in medical language and may contain abbreviations or verbiage that are unfamiliar.                                       [1]   Past Surgical History:  Procedure Laterality Date          Cystoscopy,insert ureteral stent      7/10/24 LEFT percutaneous nephrolithotomy    Hernia surgery            Other surgical history  2024    kidney stone    Other surgical history  04/15/2024    stent exchange    Other surgical history  07/10/2024    NEPHROSTOMY TUBE IN  PLACE    Repair ing hernia,5+y/o,reducibl      Ventral hernia repair N/A 2016    Procedure: HERNIA VENTRAL REPAIR;  Surgeon: Vijay Randall MD;  Location:  MAIN OR   [2]   Family History  Problem Relation Age of Onset    Cancer Mother     Heart Disease Maternal Grandfather    [3] No Known Allergies  [4]   No current facility-administered medications on file prior to encounter.     Current Outpatient Medications on File Prior to Encounter   Medication Sig Dispense Refill    oxybutynin ER 10 MG Oral Tablet 24 Hr       Multiple Vitamins-Minerals (CENTRUM SILVER 50+WOMEN) Oral Tab Take 1 tablet by mouth in the morning.      NON FORMULARY Take 1 tablet by mouth in the morning. Product: Pure Synergy.      CINNAMON OR Take 1 tablet by mouth in the morning.      NON FORMULARY Take by mouth in the morning. Product: Ka'Donis powder.      cholecalciferol 50 MCG (2000 UT) Oral Cap Take 1 capsule (2,000 Units total) by mouth in the morning.      NON FORMULARY Take by mouth in the morning. Beet root powder.      acetaminophen 325 MG Oral Tab Take 1-2 tablets (325-650 mg total) by mouth every 6 (six) hours as needed for Pain.      Fluticasone Propionate 50 MCG/ACT Nasal Suspension 1 spray by Each Nare route daily as needed for Rhinitis or Allergies.      B Complex Vitamins (B COMPLEX OR) Take 1 tablet by mouth in the morning.       Bromelains (BROMELAIN OR) Take 1 tablet by mouth in the morning.      COLLAGEN OR Take 1 tablet by mouth in the morning.      Green Tea, Camillia sinensis, (GREEN TEA OR) Take 1 tablet by mouth in the morning.      Omega-3 Fatty Acids (OMEGA-3 FISH OIL OR) Take 1 capsule by mouth in the morning.      Probiotic Product (PROBIOTIC DAILY OR) Take 1 capsule by mouth in the morning.      Glucose Blood (ONETOUCH VERIO) In Vitro Strip Test blood sugar daily (before breakfast or before dinner) 200 strip 3    Glucose Blood (ONETOUCH VERIO) In Vitro Strip Check blood sugar twice daily 200 strip 3    OneTouch Delica Lancets 30G Does not apply Misc 1 each 2 (two) times daily. 200 each 3    Blood Glucose Monitoring Suppl (ONETOUCH VERIO FLEX SYSTEM) w/Device Does not apply Kit 1 kit every morning before breakfast. Test blood sugar daily (before breakfast or before dinner) 1 kit 0    Lancets (ONETOUCH DELICA PLUS [id number]) Does not apply Misc 1 Lancet daily. Test blood sugar daily (before breakfast or before dinner) 100 each 0

## 2025-05-31 PROBLEM — A41.9 SEPSIS WITH ACUTE ORGAN DYSFUNCTION AND SEPTIC SHOCK (HCC): Status: ACTIVE | Noted: 2025-05-30

## 2025-05-31 PROBLEM — R65.21 SEPSIS WITH ACUTE ORGAN DYSFUNCTION AND SEPTIC SHOCK (HCC): Status: ACTIVE | Noted: 2025-05-30

## 2025-05-31 LAB
ALBUMIN SERPL-MCNC: 3.5 G/DL (ref 3.2–4.8)
ALBUMIN/GLOB SERPL: 1.1 {RATIO} (ref 1–2)
ALP LIVER SERPL-CCNC: 111 U/L (ref 46–118)
ALT SERPL-CCNC: 30 U/L (ref 10–49)
ANION GAP SERPL CALC-SCNC: 9 MMOL/L (ref 0–18)
AST SERPL-CCNC: 28 U/L (ref ?–34)
BASOPHILS # BLD AUTO: 0.1 X10(3) UL (ref 0–0.2)
BASOPHILS NFR BLD AUTO: 0.6 %
BILIRUB SERPL-MCNC: <0.2 MG/DL (ref 0.3–1.2)
BUN BLD-MCNC: 54 MG/DL (ref 9–23)
CALCIUM BLD-MCNC: 8.9 MG/DL (ref 8.7–10.6)
CHLORIDE SERPL-SCNC: 110 MMOL/L (ref 98–112)
CO2 SERPL-SCNC: 20 MMOL/L (ref 21–32)
CREAT BLD-MCNC: 2.78 MG/DL (ref 0.55–1.02)
EGFRCR SERPLBLD CKD-EPI 2021: 19 ML/MIN/1.73M2 (ref 60–?)
EOSINOPHIL # BLD AUTO: 0.18 X10(3) UL (ref 0–0.7)
EOSINOPHIL NFR BLD AUTO: 1.2 %
ERYTHROCYTE [DISTWIDTH] IN BLOOD BY AUTOMATED COUNT: 15.7 %
GLOBULIN PLAS-MCNC: 3.2 G/DL (ref 2–3.5)
GLUCOSE BLD-MCNC: 119 MG/DL (ref 70–99)
HCT VFR BLD AUTO: 37.5 % (ref 35–48)
HGB BLD-MCNC: 12.1 G/DL (ref 12–16)
IMM GRANULOCYTES # BLD AUTO: 0.14 X10(3) UL (ref 0–1)
IMM GRANULOCYTES NFR BLD: 0.9 %
LYMPHOCYTES # BLD AUTO: 1.98 X10(3) UL (ref 1–4)
LYMPHOCYTES NFR BLD AUTO: 12.9 %
MCH RBC QN AUTO: 29.6 PG (ref 26–34)
MCHC RBC AUTO-ENTMCNC: 32.3 G/DL (ref 31–37)
MCV RBC AUTO: 91.7 FL (ref 80–100)
MONOCYTES # BLD AUTO: 1.16 X10(3) UL (ref 0.1–1)
MONOCYTES NFR BLD AUTO: 7.5 %
NEUTROPHILS # BLD AUTO: 11.84 X10 (3) UL (ref 1.5–7.7)
NEUTROPHILS # BLD AUTO: 11.84 X10(3) UL (ref 1.5–7.7)
NEUTROPHILS NFR BLD AUTO: 76.9 %
OSMOLALITY SERPL CALC.SUM OF ELEC: 304 MOSM/KG (ref 275–295)
PLATELET # BLD AUTO: 281 10(3)UL (ref 150–450)
POTASSIUM SERPL-SCNC: 4.2 MMOL/L (ref 3.5–5.1)
PROT SERPL-MCNC: 6.7 G/DL (ref 5.7–8.2)
RBC # BLD AUTO: 4.09 X10(6)UL (ref 3.8–5.3)
SODIUM SERPL-SCNC: 139 MMOL/L (ref 136–145)
WBC # BLD AUTO: 15.4 X10(3) UL (ref 4–11)

## 2025-05-31 PROCEDURE — 99233 SBSQ HOSP IP/OBS HIGH 50: CPT | Performed by: INTERNAL MEDICINE

## 2025-05-31 PROCEDURE — 99232 SBSQ HOSP IP/OBS MODERATE 35: CPT | Performed by: HOSPITALIST

## 2025-05-31 RX ORDER — OXYBUTYNIN CHLORIDE 5 MG/1
5 TABLET ORAL ONCE
Status: DISCONTINUED | OUTPATIENT
Start: 2025-05-31 | End: 2025-06-01

## 2025-05-31 NOTE — PLAN OF CARE
Patient AAOx4. Saturating WNL on room air. Tele-NSR. Denies pain. IV fluids infusing. Blood cultures +, Dr. Henson made aware, repeat blood cx, IV antibiotic. Urology consulted, seen by Linda CINTRON, Urology. Malave catheter placed per order from Urology PA for maximum bladder decompression. Malave draining yellow, clear urine. Diminished appetite. Independent after set-up. Patient and daughter at bedside educated/updated on POC, questions answered, understanding verbalized.     Problem: Patient/Family Goals  Goal: Patient/Family Long Term Goal  Description: Patient's Long Term Goal: discharge with adequate resources    Interventions:  - IV abx, IVF  -see consults  -labs, imaging, meds as ordered   - See additional Care Plan goals for specific interventions  Outcome: Progressing  Goal: Patient/Family Short Term Goal  Description: Patient's Short Term Goal: remain fever free fro 24 hrs    Interventions:   - monitor cultures  -monitor temps  -IV antibiotics  - See additional Care Plan goals for specific interventions  Outcome: Progressing     Problem: METABOLIC/FLUID AND ELECTROLYTES - ADULT  Goal: Hemodynamic stability and optimal renal function maintained  Description: INTERVENTIONS:  - Monitor labs and assess for signs and symptoms of volume excess or deficit  - Monitor intake, output and patient weight  - Monitor urine specific gravity, serum osmolarity and serum sodium as indicated or ordered  - Monitor response to interventions for patient's volume status, including labs, urine output, blood pressure (other measures as available)  - Encourage oral intake as appropriate  - Instruct patient on fluid and nutrition restrictions as appropriate  Outcome: Progressing

## 2025-05-31 NOTE — PROGRESS NOTES
Pt A&Ox4. Alternating between RA while awake and 4L while napping. On  and tele, vitals stable. Continent, marmolejo in place. Up stand by. Ceftriaxone. Pt updated on plan of care, all questions and concerns addressed, verbalized understanding. Safety precautions in place, no further needs at this time.

## 2025-05-31 NOTE — PLAN OF CARE
Problem: Diabetes/Glucose Control  Goal: Glucose maintained within prescribed range  Description: INTERVENTIONS:  - Monitor Blood Glucose as ordered  - Assess for signs and symptoms of hyperglycemia and hypoglycemia  - Administer ordered medications to maintain glucose within target range  - Assess barriers to adequate nutritional intake and initiate nutrition consult as needed  - Instruct patient on self management of diabetes  Outcome: Progressing     Problem: Patient/Family Goals  Goal: Patient/Family Long Term Goal  Description: Patient's Long Term Goal:     Interventions:  -   - See additional Care Plan goals for specific interventions  Outcome: Progressing  Goal: Patient/Family Short Term Goal  Description: Patient's Short Term Goal:     Interventions:   -   - See additional Care Plan goals for specific interventions  Outcome: Progressing     Problem: METABOLIC/FLUID AND ELECTROLYTES - ADULT  Goal: Hemodynamic stability and optimal renal function maintained  Description: INTERVENTIONS:  - Monitor labs and assess for signs and symptoms of volume excess or deficit  - Monitor intake, output and patient weight  - Monitor urine specific gravity, serum osmolarity and serum sodium as indicated or ordered  - Monitor response to interventions for patient's volume status, including labs, urine output, blood pressure (other measures as available)  - Encourage oral intake as appropriate  - Instruct patient on fluid and nutrition restrictions as appropriate  Outcome: Progressing

## 2025-05-31 NOTE — PROGRESS NOTES
[facility]   part of [facility]     Nephrology Progress Note    Kirstin Myers Patient Status:  Inpatient    3/24/1970 MRN [id number]   Location [facility] 5NW-A Attending Karen Nicholas MD   Hosp Day # 2 PCP None Pcp       SUBJECTIVE:  Feels better today        Physical Exam:   /63   Pulse 65   Temp 98.1 °F (36.7 °C) (Oral)   Resp 18   Ht 5' 9\" (1.753 m)   Wt 280 lb (127 kg)   LMP 2023 (Approximate)   SpO2 95%   BMI 41.35 kg/m²   Temp (24hrs), Av.7 °F (37.1 °C), Min:98.1 °F (36.7 °C), Max:99.2 °F (37.3 °C)       Intake/Output Summary (Last 24 hours) at 2025 0853  Last data filed at 2025 0321  Gross per 24 hour   Intake 3833 ml   Output 2400 ml   Net 1433 ml     Last 3 Weights   25 2304 280 lb (127 kg)   25 1600 280 lb (127 kg)   24 1747 275 lb (124.7 kg)   24 1259 292 lb 12.8 oz (132.8 kg)   24 1459 287 lb 11.2 oz (130.5 kg)   24 0603 291 lb (132 kg)   24 1147 293 lb (132.9 kg)     General: Alert and oriented in no apparent distress.  HEENT: No scleral icterus, MMM  Neck: Supple, no CHAD or thyromegaly  Cardiac: Regular rate and rhythm, S1, S2 normal, no murmur or rub  Lungs: Clear without wheezes, rales, rhonchi.    Abdomen: Soft, non-tender. + bowel sounds, no palpable organomegaly  Extremities: Without clubbing, cyanosis or edema.  Neurologic: Alert and oriented, cranial nerves grossly intact, moving all extremities  Skin: Warm and dry, no rash        Labs:     Recent Labs   Lab 25  1628 25  0723 25  0648   WBC 27.1* 21.4* 15.4*   HGB 12.6 11.9* 12.1   MCV 89.2 87.3 91.7   .0 236.0 281.0   BAND 10  --   --    INR  --  1.02  --        Recent Labs   Lab 25  1628 25  0723 25  0648   * 137 139   K 4.8 4.1 4.2   CL 97* 102 110   CO2 23.0 23.0 20.0*   BUN 55* 64* 54*   CREATSERUM 6.20* 5.09* 2.78*   CA 10.5 9.3 8.9   MG  --  2.2  --    PHOS  --  3.5  --    * 142* 119*        Recent Labs   Lab 05/29/25  1628 05/30/25  0723 05/31/25  0648   ALT 25 31 30   AST 28 36* 28   ALB 3.8 3.5 3.5       Recent Labs   Lab 05/29/25  1904   PGLU 127*       Meds:   Current Hospital Medications[1]      Impression/Plan:      #1.  JORGE LUIS- clinical course c/w prerenal azotemia with poor intake/nausea and decr renal perfusion with hypotension/septic shock.  Renal fxn sig better today.  No obstruction on CT.  Cont IVF but will back off rate     #2.  Proteus sepsis- appears to be due to urosepsis.  No obstructing stones.  On abx.  BP improved     Questions/concerns were discussed with patient and/or family by bedside.          Vijay Murphy MD  5/31/2025  8:53 AM         [1]   Current Facility-Administered Medications   Medication Dose Route Frequency    benzonatate (Tessalon) cap 100 mg  100 mg Oral TID PRN    benzocaine-menthol (Cepacol) lozenge 1 lozenge  1 lozenge Oral PRN    cefTRIAXone (Rocephin) 2 g in sodium chloride 0.9% 100 mL IVPB-ADDV  2 g Intravenous Q24H    sodium chloride 0.9% infusion   Intravenous Continuous    heparin (Porcine) 5000 UNIT/ML injection 5,000 Units  5,000 Units Subcutaneous Q8H DAVIN    acetaminophen (Tylenol Extra Strength) tab 500 mg  500 mg Oral Q4H PRN    oxyCODONE immediate release tab 5 mg  5 mg Oral Q4H PRN    melatonin tab 3 mg  3 mg Oral Nightly PRN    polyethylene glycol (PEG 3350) (Miralax) 17 g oral packet 17 g  17 g Oral Daily PRN    sennosides (Senokot) tab 17.2 mg  17.2 mg Oral Nightly PRN    bisacodyl (Dulcolax) 10 MG rectal suppository 10 mg  10 mg Rectal Daily PRN    ondansetron (Zofran) 4 MG/2ML injection 4 mg  4 mg Intravenous Q6H PRN    prochlorperazine (Compazine) 10 MG/2ML injection 5 mg  5 mg Intravenous Q8H PRN    nicotine (Nicoderm CQ) 21 MG/24HR patch 1 patch  1 patch Transdermal Daily

## 2025-05-31 NOTE — PROGRESS NOTES
[facility]   part of [facility]     Hospitalist Progress Note     Kirstin Myers Patient Status:  Inpatient    3/24/1970 MRN [id number]   [facility] 5NW-A Attending Karen Nicholas MD   Hosp Day # 2 PCP None Pcp     Chief Complaint: rigors    Subjective:     Patient feeling much better    Objective:    Review of Systems:   A comprehensive review of systems was completed; pertinent positive and negatives stated in subjective.    Vital signs:  Temp:  [98.1 °F (36.7 °C)-99.2 °F (37.3 °C)] 98.5 °F (36.9 °C)  Pulse:  [60-72] 66  Resp:  [17-18] 18  BP: ()/(63-79) 102/79  SpO2:  [92 %-97 %] 94 %    Physical Exam:    General: No acute distress  Respiratory: No wheezes, no rhonchi  Cardiovascular: S1, S2, regular rate and rhythm  Abdomen: Soft, Non-tender, non-distended, positive bowel sounds  Neuro: No new focal deficits.   Extremities: No edema    Diagnostic Data:    Labs:  Recent Labs   Lab 25  1628 25  0723 25  0648   WBC 27.1* 21.4* 15.4*   HGB 12.6 11.9* 12.1   MCV 89.2 87.3 91.7   .0 236.0 281.0   BAND 10  --   --    INR  --  1.02  --        Recent Labs   Lab 25  1628 25  0723 25  0648   * 142* 119*   BUN 55* 64* 54*   CREATSERUM 6.20* 5.09* 2.78*   CA 10.5 9.3 8.9   ALB 3.8 3.5 3.5   * 137 139   K 4.8 4.1 4.2   CL 97* 102 110   CO2 23.0 23.0 20.0*   ALKPHO 134* 126* 111   AST 28 36* 28   ALT 25 31 30   BILT 0.3 <0.2* <0.2*   TP 7.1 6.5 6.7       Estimated Glomerular Filtration Rate: 19 mL/min/1.73m2 (A) (result from lab).    No results for input(s): \"TROP\", \"TROPHS\", \"CK\" in the last 168 hours.    Recent Labs   Lab 25  0723   PTP 13.6   INR 1.02                  Microbiology    Hospital Encounter on 25   1. Urine Culture, Routine     Status: Abnormal (Preliminary result)    Collection Time: 25  6:20 PM    Specimen: Urine, clean catch   Result Value Ref Range    Urine Culture >100,000 CFU/ML Proteus mirabilis  (A) N/A   2. Blood Culture     Status: Abnormal (Preliminary result)    Collection Time: 05/29/25  6:01 PM    Specimen: Blood,peripheral   Result Value Ref Range    Blood Culture Result Positive N/A    Blood Culture Result Proteus mirabilis (A) N/A    Blood Smear Positive Blood Culture (A) N/A    Blood Smear Gram Negative Rods (A) N/A         Imaging: Reviewed in Epic.    Medications:    cefTRIAXone  2 g Intravenous Q24H    heparin  5,000 Units Subcutaneous Q8H DAVIN    nicotine  1 patch Transdermal Daily       Assessment & Plan:      #Severe sepsis 2/2 pyelonephritis   #Hypotension - fluid responsive  #septicemia -proteus. No ESBL  #hx staghorn calculus and recurrent stones causing UTI w/ hx stone extraction  -cultures pending final  -repeat bcx  -cont antibiotics,   -Supportive care  -IVF  -urology on consult  -marmolejo, VT prior to DC     #JORGE LUIS   -IVF. Trend. Nephrology consult      #Non-obstructive nephrolithiasis   #Pre-diabetes  #Morbid obesity, BMI 41.35      Danny Henson MD    Supplementary Documentation:     Quality:  DVT Mechanical Prophylaxis:     Early ambuation  DVT Pharmacologic Prophylaxis   Medication    heparin (Porcine) 5000 UNIT/ML injection 5,000 Units                Code Status: Prior  Marmolejo: Marmolejo catheter in place  Marmolejo Duration (in days):   Central line:    ALIX:     Discharge is dependent on: course  At this point Ms. Myers is expected to be discharge to: home    The 21st Century Cures Act makes medical notes like these available to patients in the interest of transparency. Please be advised this is a medical document. Medical documents are intended to carry relevant information, facts as evident, and the clinical opinion of the practitioner. The medical note is intended as peer to peer communication and may appear blunt or direct. It is written in medical language and may contain abbreviations or verbiage that are unfamiliar.

## 2025-05-31 NOTE — PROGRESS NOTES
Pt &OX4 on room air, no complaints of pain. Tolerating medications well per mar. VSS. IV infusing fluids. Malave intact, draining clear yellow urine. Does have a \"tickle/ cough\" requested a throat lozenge, placed order. Family at bedside. Call light within reach, frequent checks made, needs met.      0430 pt destating down to the 60s. Placed them, on 10L was able to wean down to 4 after recovering

## 2025-06-01 VITALS
TEMPERATURE: 98 F | BODY MASS INDEX: 41.47 KG/M2 | HEIGHT: 69 IN | HEART RATE: 68 BPM | WEIGHT: 280 LBS | DIASTOLIC BLOOD PRESSURE: 74 MMHG | RESPIRATION RATE: 18 BRPM | OXYGEN SATURATION: 95 % | SYSTOLIC BLOOD PRESSURE: 146 MMHG

## 2025-06-01 LAB
ANION GAP SERPL CALC-SCNC: 10 MMOL/L (ref 0–18)
BACTERIA BLD CULT: POSITIVE
BACTERIA BLD CULT: POSITIVE
BASOPHILS # BLD AUTO: 0.13 X10(3) UL (ref 0–0.2)
BASOPHILS NFR BLD AUTO: 1.1 %
BLACTX-M ISLT/SPM QL: NOT DETECTED
BUN BLD-MCNC: 35 MG/DL (ref 9–23)
CALCIUM BLD-MCNC: 9.2 MG/DL (ref 8.7–10.6)
CHLORIDE SERPL-SCNC: 109 MMOL/L (ref 98–112)
CO2 SERPL-SCNC: 22 MMOL/L (ref 21–32)
CREAT BLD-MCNC: 1.75 MG/DL (ref 0.55–1.02)
EGFRCR SERPLBLD CKD-EPI 2021: 34 ML/MIN/1.73M2 (ref 60–?)
EOSINOPHIL # BLD AUTO: 0.16 X10(3) UL (ref 0–0.7)
EOSINOPHIL NFR BLD AUTO: 1.4 %
ERYTHROCYTE [DISTWIDTH] IN BLOOD BY AUTOMATED COUNT: 16 %
GLUCOSE BLD-MCNC: 123 MG/DL (ref 70–99)
HCT VFR BLD AUTO: 37.7 % (ref 35–48)
HGB BLD-MCNC: 11.8 G/DL (ref 12–16)
IMM GRANULOCYTES # BLD AUTO: 0.42 X10(3) UL (ref 0–1)
IMM GRANULOCYTES NFR BLD: 3.7 %
LYMPHOCYTES # BLD AUTO: 1.87 X10(3) UL (ref 1–4)
LYMPHOCYTES NFR BLD AUTO: 16.4 %
MCH RBC QN AUTO: 28.6 PG (ref 26–34)
MCHC RBC AUTO-ENTMCNC: 31.3 G/DL (ref 31–37)
MCV RBC AUTO: 91.3 FL (ref 80–100)
MONOCYTES # BLD AUTO: 0.81 X10(3) UL (ref 0.1–1)
MONOCYTES NFR BLD AUTO: 7.1 %
NEUTROPHILS # BLD AUTO: 7.98 X10 (3) UL (ref 1.5–7.7)
NEUTROPHILS # BLD AUTO: 7.98 X10(3) UL (ref 1.5–7.7)
NEUTROPHILS NFR BLD AUTO: 70.3 %
OSMOLALITY SERPL CALC.SUM OF ELEC: 301 MOSM/KG (ref 275–295)
PLATELET # BLD AUTO: 295 10(3)UL (ref 150–450)
POTASSIUM SERPL-SCNC: 4.5 MMOL/L (ref 3.5–5.1)
PROTEUS SP DNA BLD POS QL NAA+NON-PROBE: DETECTED
RBC # BLD AUTO: 4.13 X10(6)UL (ref 3.8–5.3)
SODIUM SERPL-SCNC: 141 MMOL/L (ref 136–145)
WBC # BLD AUTO: 11.4 X10(3) UL (ref 4–11)

## 2025-06-01 PROCEDURE — 99233 SBSQ HOSP IP/OBS HIGH 50: CPT | Performed by: INTERNAL MEDICINE

## 2025-06-01 PROCEDURE — 99239 HOSP IP/OBS DSCHRG MGMT >30: CPT | Performed by: HOSPITALIST

## 2025-06-01 RX ORDER — LACTOBACILLUS ACIDOPHILUS 500MM CELL
1 CAPSULE ORAL DAILY
Status: DISCONTINUED | OUTPATIENT
Start: 2025-06-01 | End: 2025-06-01

## 2025-06-01 RX ORDER — CEPHALEXIN 500 MG/1
500 CAPSULE ORAL 3 TIMES DAILY
Qty: 36 CAPSULE | Refills: 0 | Status: SHIPPED | OUTPATIENT
Start: 2025-06-01 | End: 2025-06-13

## 2025-06-01 NOTE — PROGRESS NOTES
NURSING DISCHARGE NOTE    Discharged Home via Wheelchair.  Accompanied by Support staff  Belongings Taken by patient/family.  IV,  and tele removed.  Discharge paperwork completed.  No further questions or concerns.

## 2025-06-01 NOTE — PROGRESS NOTES
[facility]   part of [facility]     Nephrology Progress Note    Kirstin Myers Patient Status:  Inpatient    3/24/1970 MRN [id number]   Location [facility] 5NW-A Attending Karen Nicholas MD   Hosp Day # 3 PCP None Pcp       SUBJECTIVE:  Had issues with marmolejo cath last night and it was discontinued.  Urinating well since removal        Physical Exam:   /80   Pulse 74   Temp 98.4 °F (36.9 °C) (Oral)   Resp 18   Ht 5' 9\" (1.753 m)   Wt 280 lb (127 kg)   LMP 2023 (Approximate)   SpO2 95%   BMI 41.35 kg/m²   Temp (24hrs), Av.7 °F (37.1 °C), Min:98.1 °F (36.7 °C), Max:99.6 °F (37.6 °C)       Intake/Output Summary (Last 24 hours) at 2025 1002  Last data filed at 2025 2350  Gross per 24 hour   Intake --   Output 1075 ml   Net -1075 ml     Last 3 Weights   25 2304 280 lb (127 kg)   25 1600 280 lb (127 kg)   24 1747 275 lb (124.7 kg)   24 1259 292 lb 12.8 oz (132.8 kg)   24 1459 287 lb 11.2 oz (130.5 kg)   24 0603 291 lb (132 kg)   24 1147 293 lb (132.9 kg)     General: Alert and oriented in no apparent distress.  HEENT: No scleral icterus, MMM  Neck: Supple, no CHAD or thyromegaly  Cardiac: Regular rate and rhythm, S1, S2 normal, no murmur or rub  Lungs: Clear without wheezes, rales, rhonchi.    Abdomen: Soft, non-tender. + bowel sounds, no palpable organomegaly  Extremities: Without clubbing, cyanosis or edema.  Neurologic: Alert and oriented, cranial nerves grossly intact, moving all extremities  Skin: Warm and dry, no rash        Labs:     Recent Labs   Lab 25  1628 25  0723 25  0648 25  0737   WBC 27.1* 21.4* 15.4* 11.4*   HGB 12.6 11.9* 12.1 11.8*   MCV 89.2 87.3 91.7 91.3   .0 236.0 281.0 295.0   BAND 10  --   --   --    INR  --  1.02  --   --        Recent Labs   Lab 25  1628 25  0723 25  0648 25  0737   * 137 139 141   K 4.8 4.1 4.2 4.5   CL 97* 102 110 109   CO2  23.0 23.0 20.0* 22.0   BUN 55* 64* 54* 35*   CREATSERUM 6.20* 5.09* 2.78* 1.75*   CA 10.5 9.3 8.9 9.2   MG  --  2.2  --   --    PHOS  --  3.5  --   --    * 142* 119* 123*       Recent Labs   Lab 05/29/25  1628 05/30/25  0723 05/31/25  0648   ALT 25 31 30   AST 28 36* 28   ALB 3.8 3.5 3.5       Recent Labs   Lab 05/29/25  1904   PGLU 127*       Meds:   Current Hospital Medications[1]      Impression/Plan:      #1.  JORGE LUIS- clinical course c/w prerenal azotemia with poor intake/nausea and decr renal perfusion with hypotension/septic shock.  Renal again fxn sig better today off IVF and would anticipate recovery to normal     #2.  Proteus sepsis- appears to be due to urosepsis.  No obstructing stones.  On abx.  BP improved     Questions/concerns were discussed with patient and/or family by bedside.    Nephrology will sign off.  Please call with questions    Vijay Murphy MD  6/1/2025  10:03 AM           [1]   Current Facility-Administered Medications   Medication Dose Route Frequency    acidophilus (Probiotic) cap/tab 1 each  1 each Oral Daily    oxybutynin (Ditropan) tab 5 mg  5 mg Oral Once    benzonatate (Tessalon) cap 100 mg  100 mg Oral TID PRN    benzocaine-menthol (Cepacol) lozenge 1 lozenge  1 lozenge Oral PRN    cefTRIAXone (Rocephin) 2 g in sodium chloride 0.9% 100 mL IVPB-ADDV  2 g Intravenous Q24H    heparin (Porcine) 5000 UNIT/ML injection 5,000 Units  5,000 Units Subcutaneous Q8H DAVIN    acetaminophen (Tylenol Extra Strength) tab 500 mg  500 mg Oral Q4H PRN    oxyCODONE immediate release tab 5 mg  5 mg Oral Q4H PRN    melatonin tab 3 mg  3 mg Oral Nightly PRN    polyethylene glycol (PEG 3350) (Miralax) 17 g oral packet 17 g  17 g Oral Daily PRN    sennosides (Senokot) tab 17.2 mg  17.2 mg Oral Nightly PRN    bisacodyl (Dulcolax) 10 MG rectal suppository 10 mg  10 mg Rectal Daily PRN    ondansetron (Zofran) 4 MG/2ML injection 4 mg  4 mg Intravenous Q6H PRN    prochlorperazine (Compazine) 10 MG/2ML  injection 5 mg  5 mg Intravenous Q8H PRN    nicotine (Nicoderm CQ) 21 MG/24HR patch 1 patch  1 patch Transdermal Daily

## 2025-06-01 NOTE — PLAN OF CARE
Pt is A&O x 4. VSS, afebrile. SPO2 maintained on 4L with sleep, RA baseline. NSR on tele. Tolerating renal diet. Voids. Marmolejo was removed, pt was complaining of pain and wanted it out. Up SBA. No pain now that marmolejo is out. PIV SL.  Patient updated on poc, no further needs at this time. Safety precautions in place.     Problem: Diabetes/Glucose Control  Goal: Glucose maintained within prescribed range  Description: INTERVENTIONS:  - Monitor Blood Glucose as ordered  - Assess for signs and symptoms of hyperglycemia and hypoglycemia  - Administer ordered medications to maintain glucose within target range  - Assess barriers to adequate nutritional intake and initiate nutrition consult as needed  - Instruct patient on self management of diabetes  Outcome: Progressing     Problem: Patient/Family Goals  Goal: Patient/Family Long Term Goal  Description: Patient's Long Term Goal: Discharge    Interventions:  - follow poc  - See additional Care Plan goals for specific interventions  Outcome: Progressing  Goal: Patient/Family Short Term Goal  Description: Patient's Short Term Goal:   5/31 NOC: rest, sleep, pt wants marmolejo removed     Interventions:   - 5/31 NOC: marmolejo removed, pt states she feels better  - See additional Care Plan goals for specific interventions  Outcome: Progressing     Problem: METABOLIC/FLUID AND ELECTROLYTES - ADULT  Goal: Hemodynamic stability and optimal renal function maintained  Description: INTERVENTIONS:  - Monitor labs and assess for signs and symptoms of volume excess or deficit  - Monitor intake, output and patient weight  - Monitor urine specific gravity, serum osmolarity and serum sodium as indicated or ordered  - Monitor response to interventions for patient's volume status, including labs, urine output, blood pressure (other measures as available)  - Encourage oral intake as appropriate  - Instruct patient on fluid and nutrition restrictions as appropriate  Outcome: Progressing

## 2025-06-02 ENCOUNTER — PATIENT OUTREACH (OUTPATIENT)
Dept: CASE MANAGEMENT | Age: 55
End: 2025-06-02

## 2025-06-02 NOTE — DISCHARGE SUMMARY
[place] HOSPITALIST  DISCHARGE SUMMARY     Kirstin Myers Patient Status:  Inpatient    3/24/1970 MRN [id number]   Location [facility] 5NW-A Attending No att. providers found   Hosp Day # 3 PCP None Pcp     Date of Admission: 2025  Date of Discharge:  2025     Discharge Disposition: Home or Self Care    Discharge Diagnosis:  #Severe sepsis 2/2 pyelonephritis   #Hypotension - fluid responsive  #septicemia -proteus. No ESBL  #hx staghorn calculus and recurrent stones causing UTI w/ hx stone extraction  -cultures proteus, susc keflex  -repeat bcx ngtd  -cont antibiotics,   -Supportive care  -IVF  -urology on consult  -marmolejo removed     #JORGE LUIS   -IVF. Trend. Nephrology consult      #Non-obstructive nephrolithiasis   #Pre-diabetes  #Morbid obesity, BMI 41.35    History of Present Illness:   Kirstin Myers is a 55 year old female with history of prediabetes, nephrolithiasis, obesity (BMI 41.35) presented with fevers, nausea, inability to tolerate oral intake.      Patient reports 1 week history of rigors and R > L flank pain. She has associated nausea and poor intake. She presented today because of persistent rigors.      He was hypotensive to systolic 70s in ED which responded to fluids. Blood work concerning for acute renal failure  with Scr 6.2, BUN 55. WBC 27.1. UA c/f UTI with > 50 WBC, large LE, moderate blood. CT AP with b/l nonobstructive nephrolithiasis, perinephric stranding about both kidneys R > L. She was given rocephin and IVF boluses and admitted.        Brief Synopsis: pt w/ septicemia 2/2 proteus UTI/pyelo. Responded to IVF and IV abx. Repeat bcxs negative. Cxs grew proteus susc to keflex. Had severe JORGE LUIS w/ Cr up to 6 but much improved after IVF. Pyelo related to hx of renal stones. Stone burden worsening compared to previous imaging. To f/u urology for stone mgmt. Ok to DC home.     Lace+ Score: 43  59-90 High Risk  29-58 Medium Risk  0-28   Low Risk  [facility].    TCM Follow-Up Recommendation:  LACE 29-58: Moderate Risk of readmission after discharge from the hospital.      Procedures during hospitalization:   none    Incidental or significant findings and recommendations (brief descriptions):  none    Lab/Test results pending at Discharge:   none    Consultants:  Urology, nephro    Discharge Medication List:     Discharge Medications        START taking these medications        Instructions Prescription details   cephALEXin 500 MG Caps  Commonly known as: Keflex      Take 1 capsule (500 mg total) by mouth 3 (three) times daily for 12 days.   Stop taking on: June 13, 2025  Quantity: 36 capsule  Refills: 0            CONTINUE taking these medications        Instructions Prescription details   acetaminophen 325 MG Tabs  Commonly known as: Tylenol      Take 1-2 tablets (325-650 mg total) by mouth every 6 (six) hours as needed for Pain.   Refills: 0     B COMPLEX OR      Take 1 tablet by mouth in the morning.   Refills: 0     BROMELAIN OR      Take 1 tablet by mouth in the morning.   Refills: 0     Centrum Silver 50+Women Tabs      Take 1 tablet by mouth in the morning.   Refills: 0     cholecalciferol 50 MCG (2000 UT) Caps  Commonly known as: Vitamin D3      Take 1 capsule (2,000 Units total) by mouth in the morning.   Refills: 0     CINNAMON OR      Take 1 tablet by mouth in the morning.   Refills: 0     COLLAGEN OR      Take 1 tablet by mouth in the morning.   Refills: 0     fluticasone propionate 50 MCG/ACT Susp  Commonly known as: Flonase      1 spray by Each Nare route daily as needed for Rhinitis or Allergies.   Refills: 0     GREEN TEA OR      Take 1 tablet by mouth in the morning.   Refills: 0     NON FORMULARY      Take by mouth in the morning. Beet root powder.   Refills: 0     NON FORMULARY      Take 1 tablet by mouth in the morning. Product: Pure Synergy.   Refills: 0     NON FORMULARY      Take 1 Scoop by mouth in the morning. Product:  Ana powder.   Refills: 0     OMEGA-3 FISH OIL OR      Take 1 capsule by mouth in the morning.   Refills: 0     OneTouch Delica Plus [id number] Misc      1 Lancet daily. Test blood sugar daily (before breakfast or before dinner)   Quantity: 100 each  Refills: 0     OneTouch Delica Lancets 30G Misc      1 each 2 (two) times daily.   Quantity: 200 each  Refills: 3     OneTouch Verio Flex System w/Device Kit      1 kit every morning before breakfast. Test blood sugar daily (before breakfast or before dinner)   Quantity: 1 kit  Refills: 0     OneTouch Verio Strp      Check blood sugar twice daily   Quantity: 200 strip  Refills: 3     OneTouch Verio Strp      Test blood sugar daily (before breakfast or before dinner)   Quantity: 200 strip  Refills: 3     oxybutynin ER 10 MG Tb24  Commonly known as: Ditropan-XL      Take 1 tablet (10 mg total) by mouth at bedtime.   Refills: 0     PROBIOTIC DAILY OR      Take 1 capsule by mouth in the morning.   Refills: 0               Where to Get Your Medications        These medications were sent to [facility][id number] - [facility] [address] AT [address] [place] [address], [phone], [phone]  [address] [address]      Phone: [phone]   cephALEXin 500 MG Caps         [place]    Follow-up appointment:   [facility]  [address]  [facility] [address] [phone]  Schedule an appointment as soon as possible for a visit      Kip Ann MD  [address]  [facility] [id number] [phone]    Follow up      Pcp, None  [facility] [id number]    Schedule an appointment as soon as possible for a visit      Appointments for Next 30 Days 6/1/2025 - 7/1/2025      None            Vital signs:  Temp:  [98.1 °F (36.7 °C)-98.8 °F (37.1 °C)] 98.2 °F (36.8 °C)  Pulse:  [58-74] 68  Resp:  [18-20] 18  BP: ()/(44-80) 146/74  SpO2:  [94 %-95 %] 95 %    Physical Exam:    General: No acute distress    Lungs: clear to auscultation  Cardiovascular: S1, S2  Abdomen: Soft      -----------------------------------------------------------------------------------------------  PATIENT DISCHARGE INSTRUCTIONS: See electronic chart    Danny Henson MD    Total time spent on discharge plannin minutes     The  Century Cures Act makes medical notes like these available to patients in the interest of transparency. Please be advised this is a medical document. Medical documents are intended to carry relevant information, facts as evident, and the clinical opinion of the practitioner. The medical note is intended as peer to peer communication and may appear blunt or direct. It is written in medical language and may contain abbreviations or verbiage that are unfamiliar.

## 2025-06-02 NOTE — PAYOR COMM NOTE
--------------  CONTINUED STAY REVIEW  5/30-6/1  Payor: [employer]  Subscriber #:  [id number]  Authorization Number: [id number]    Admit date: 5/29/25  Admit time: 10:54 PM    REVIEW DOCUMENTATION:  5/30    Temp:  [98.1 °F (36.7 °C)-98.8 °F (37.1 °C)] 98.1 °F (36.7 °C)  Pulse:  [67-91] 80  Resp:  [13-25] 18  BP: ()/(46-73) 113/69  SpO2:  [92 %-98 %] 94 %      0723     WBC 27.1* 21.4*   HGB 12.6 11.9*   MCV 89.2 87.3   .0 236.0   BAND 10  --    INR  --  1.02              Recent Labs   Lab 05/29/25  1628 05/30/25  0723   * 142*   BUN 55* 64*   CREATSERUM 6.20* 5.09*   CA 10.5 9.3   ALB 3.8 3.5   * 137   K 4.8 4.1   CL 97* 102   CO2 23.0 23.0   ALKPHO 134* 126*   AST 28 36*   ALT 25 31   BILT 0.3 <0.2*   TP 7.1 6.5       Microbiology           Hospital Encounter on 05/29/25   1. Blood Culture     Status: Abnormal (Preliminary result)     Collection Time: 05/29/25  5:22 PM     Specimen: Blood,peripheral   Result Value Ref Range     Blood Culture Result Positive N/A     Blood Smear Positive Blood Culture (A) N/A     Blood Smear Gram Negative Rods (A) N/A         cefTRIAXone  2 g Intravenous Q24H        #Severe sepsis 2/2 pyelonephritis   #Hypotension - fluid responsive  #septicemia -proteus. No ESBL  #hx staghorn calculus and recurrent stones causing UTI w/ hx stone extraction  -await cultures  -repeat bcx  -cont antibiotics,   -Supportive care  -IVF  -longterm pt of dr molina w/ urology, will place on consult     #JORGE LUIS   -IVF. Trend. Nephrology consult      #Non-obstructive nephrolithiasis   #Pre-diabetes  #Morbid obesity, BMI 41.35        5/30 Nephrology    #1.  JORGE LUIS- clinical course c/w prerenal azotemia with poor intake/nausea and decr renal perfusion with hypotension/septic shock.  Renal fxn better today.  No obstruction on CT.  Cont IVF     #2.  Proteus sepsis- appears to be due to urosepsis.  No obstructing stones.  On abx.  BP improved       5/30 Urology    Recommendations:  Malave  placement for maximum decompression of bladder, VT prior to discharge.   No surgical intervention indicated at this time.   Await final cultures, abx per primary  Trend Scr and monitor UOP, nephrology following            5/31 IM    Temp:  [98.1 °F (36.7 °C)-99.2 °F (37.3 °C)] 98.5 °F (36.9 °C)  Pulse:  [60-72] 66  Resp:  [17-18] 18  BP: ()/(63-79) 102/79  SpO2:  [92 %-97 %] 94 %      Lab 05/29/25 1628 05/30/25  0723 05/31/25  0648   WBC 27.1* 21.4* 15.4*   HGB 12.6 11.9* 12.1   MCV 89.2 87.3 91.7   .0 236.0 281.0   BAND 10  --   --    INR  --  1.02  --                Recent Labs   Lab 05/29/25 1628 05/30/25  0723 05/31/25  0648   * 142* 119*   BUN 55* 64* 54*   CREATSERUM 6.20* 5.09* 2.78*   CA 10.5 9.3 8.9   ALB 3.8 3.5 3.5   * 137 139   K 4.8 4.1 4.2   CL 97* 102 110   CO2 23.0 23.0 20.0*   ALKPHO 134* 126* 111   AST 28 36* 28   ALT 25 31 30   BILT 0.3 <0.2* <0.2*   TP 7.1 6.5 6.7       Microbiology           Hospital Encounter on 05/29/25   1. Urine Culture, Routine     Status: Abnormal (Preliminary result)     Collection Time: 05/29/25  6:20 PM     Specimen: Urine, clean catch   Result Value Ref Range     Urine Culture >100,000 CFU/ML Proteus mirabilis (A) N/A   2. Blood Culture     Status: Abnormal (Preliminary result)     Collection Time: 05/29/25  6:01 PM     Specimen: Blood,peripheral   Result Value Ref Range     Blood Culture Result Positive N/A     Blood Culture Result Proteus mirabilis (A) N/A     Blood Smear Positive Blood Culture (A) N/A     Blood Smear Gram Negative Rods (A) N/A           cefTRIAXone  2 g Intravenous Q24H          Assessment & Plan:  #Severe sepsis 2/2 pyelonephritis   #Hypotension - fluid responsive  #septicemia -proteus. No ESBL  #hx staghorn calculus and recurrent stones causing UTI w/ hx stone extraction  -cultures pending final  -repeat bcx  -cont antibiotics,   -Supportive care  -IVF  -urology on consult  -ZOË marmolejo prior to DC     #JORGE LUIS   -IVF. Trend.  Nephrology consult      #Non-obstructive nephrolithiasis   #Pre-diabetes  #Morbid obesity, BMI 41.35     MEDICATIONS ADMINISTERED IN LAST 1 DAY:  heparin (Porcine) 5000 UNIT/ML injection 5,000 Units       Date Action Dose Route User    Discharged on 6/1/2025 6/1/2025 0817 Given 5,000 Units Subcutaneous (Left Lower Abdomen) Kaylin Pisano, RN          nicotine (Nicoderm CQ) 21 MG/24HR patch 1 patch       Date Action Dose Route User    Discharged on 6/1/2025 6/1/2025 0817 Patch Applied 1 patch Transdermal (Right Posterior Chest) Kaylin Pisano, RN            Vitals (last day) before discharge       Date/Time Temp Pulse Resp BP SpO2 Weight O2 Device O2 Flow Rate (L/min) [facility]    06/01/25 1110 98.2 °F (36.8 °C) 68 18 146/74 -- -- None (Room air) 0 L/min     06/01/25 0739 -- 74 -- 152/80 95 % -- -- --     06/01/25 0736 98.4 °F (36.9 °C) -- 18 -- -- -- Nasal cannula 4 L/min     06/01/25 0700 -- -- -- -- -- -- Nasal cannula 4 L/min     06/01/25 0616 98.8 °F (37.1 °C) 58 18 132/62 94 % -- -- --     06/01/25 0042 98.1 °F (36.7 °C) 66 20 90/44 -- -- None (Room air) --     05/31/25 2023 99.6 °F (37.6 °C) 59 18 150/74 94 % -- None (Room air) --     05/31/25 1523 98.5 °F (36.9 °C) 66 18 102/79 94 % -- None (Room air) 0 L/min     05/31/25 1139 98.6 °F (37 °C) 60 18 91/74 92 % -- Nasal cannula 4 L/min     05/31/25 0724 -- 65 -- 124/63 95 % -- -- --     05/31/25 0721 98.1 °F (36.7 °C) 72 18 124/63 97 % -- Nasal cannula 4 L/min JE    05/31/25 0500 -- -- -- -- -- -- Nasal cannula 4 L/min AB    05/31/25 0321 98.8 °F (37.1 °C) 69 18 108/64 94 % -- None (Room air) -- AW         --------------  DISCHARGE REVIEW    Payor: [employer]  Subscriber #:  [id number]  Authorization Number: [id number]    Admit date: 5/29/25  Admit time:  10:54 PM  Discharge Date: 6/1/2025  4:37 PM     Admitting Physician: Karen Nicholas MD  Attending Physician:  No att. providers found  Primary Care Physician: Pcp, None           Discharge Summary Notes        Discharge Summary signed by Danny Henson MD at 2025 11:09 PM       Author: Danny Henson MD Specialty: HOSPITALIST, Internal Medicine Author Type: Physician    Filed: 2025 11:09 PM Date of Service: 2025 10:46 PM Status: Signed    : Danny Henson MD (Physician)           [facility]     Kirstin Myers Patient Status:  Inpatient    3/24/1970 MRN [id number]   Location [facility] 5NW-A Attending No att. providers found   Hosp Day # 3 PCP None Pcp     Date of Admission: 2025  Date of Discharge:  2025     Discharge Disposition: Home or Self Care    Discharge Diagnosis:  #Severe sepsis 2/2 pyelonephritis   #Hypotension - fluid responsive  #septicemia -proteus. No ESBL  #hx staghorn calculus and recurrent stones causing UTI w/ hx stone extraction  -cultures proteus, susc keflex  -repeat bcx ngtd  -cont antibiotics,   -Supportive care  -IVF  -urology on consult  -marmolejo removed     #JORGE LUIS   -IVF. Trend. Nephrology consult      #Non-obstructive nephrolithiasis   #Pre-diabetes  #Morbid obesity, BMI 41.35    History of Present Illness:   Kirstin Myers is a 55 year old female with history of prediabetes, nephrolithiasis, obesity (BMI 41.35) presented with fevers, nausea, inability to tolerate oral intake.      Patient reports 1 week history of rigors and R > L flank pain. She has associated nausea and poor intake. She presented today because of persistent rigors.      He was hypotensive to systolic 70s in ED which responded to fluids. Blood work concerning for acute renal failure  with Scr 6.2, BUN 55. WBC 27.1. UA c/f UTI with > 50 WBC, large LE, moderate blood. CT AP with b/l nonobstructive nephrolithiasis, perinephric stranding about both kidneys R > L. She was given rocephin and IVF boluses and admitted.        Brief Synopsis: pt w/ septicemia 2/2 proteus UTI/pyelo. Responded to IVF and IV abx. Repeat bcxs negative. Cxs grew  proteus susc to keflex. Had severe JORGE LUIS w/ Cr up to 6 but much improved after IVF. Pyelo related to hx of renal stones. Stone burden worsening compared to previous imaging. To f/u urology for stone mgmt. Ok to DC home.     Lace+ Score: 43  59-90 High Risk  29-58 Medium Risk  0-28   Low Risk  Patient was referred to [facility].    TCM Follow-Up Recommendation:  LACE 29-58: Moderate Risk of readmission after discharge from the hospital.      Procedures during hospitalization:   none    Incidental or significant findings and recommendations (brief descriptions):  none    Lab/Test results pending at Discharge:   none    Consultants:  Urology, nephro    Discharge Medication List:     Discharge Medications        START taking these medications        Instructions Prescription details   cephALEXin 500 MG Caps  Commonly known as: Keflex      Take 1 capsule (500 mg total) by mouth 3 (three) times daily for 12 days.   Stop taking on: June 13, 2025  Quantity: 36 capsule  Refills: 0            CONTINUE taking these medications        Instructions Prescription details   acetaminophen 325 MG Tabs  Commonly known as: Tylenol      Take 1-2 tablets (325-650 mg total) by mouth every 6 (six) hours as needed for Pain.   Refills: 0     B COMPLEX OR      Take 1 tablet by mouth in the morning.   Refills: 0     BROMELAIN OR      Take 1 tablet by mouth in the morning.   Refills: 0     Centrum Silver 50+Women Tabs      Take 1 tablet by mouth in the morning.   Refills: 0     cholecalciferol 50 MCG (2000 UT) Caps  Commonly known as: Vitamin D3      Take 1 capsule (2,000 Units total) by mouth in the morning.   Refills: 0     CINNAMON OR      Take 1 tablet by mouth in the morning.   Refills: 0     COLLAGEN OR      Take 1 tablet by mouth in the morning.   Refills: 0     fluticasone propionate 50 MCG/ACT Susp  Commonly known as: Flonase      1 spray by Each Nare route daily as needed for Rhinitis or Allergies.   Refills:  0     GREEN TEA OR      Take 1 tablet by mouth in the morning.   Refills: 0     NON FORMULARY      Take by mouth in the morning. Beet root powder.   Refills: 0     NON FORMULARY      Take 1 tablet by mouth in the morning. Product: Pure Synergy.   Refills: 0     NON FORMULARY      Take 1 Scoop by mouth in the morning. Product: Ka'Donis powder.   Refills: 0     OMEGA-3 FISH OIL OR      Take 1 capsule by mouth in the morning.   Refills: 0     OneTouch Delica Plus [id number] Misc      1 Lancet daily. Test blood sugar daily (before breakfast or before dinner)   Quantity: 100 each  Refills: 0     OneTouch Delica Lancets 30G Misc      1 each 2 (two) times daily.   Quantity: 200 each  Refills: 3     OneTouch Verio Flex System w/Device Kit      1 kit every morning before breakfast. Test blood sugar daily (before breakfast or before dinner)   Quantity: 1 kit  Refills: 0     OneTouch Verio Strp      Check blood sugar twice daily   Quantity: 200 strip  Refills: 3     OneTouch Verio Strp      Test blood sugar daily (before breakfast or before dinner)   Quantity: 200 strip  Refills: 3     oxybutynin ER 10 MG Tb24  Commonly known as: Ditropan-XL      Take 1 tablet (10 mg total) by mouth at bedtime.   Refills: 0     PROBIOTIC DAILY OR      Take 1 capsule by mouth in the morning.   Refills: 0               Where to Get Your Medications        These medications were sent to [facility] #[id number] - [place], [place] - [address] [place] [address], [phone], [phone]  [address] [address]      Phone: [phone]   cephALEXin 500 MG Caps         [place]    Follow-up appointment:   [facility]  [address]  [facility] [address] [phone]  Schedule an appointment as soon as possible for a visit      Kip Ann MD  [address]  [facility] [address] [phone]    Follow up      Pcp, None  [facility] [address]    Schedule an appointment as  soon as possible for a visit      Appointments for Next 30 Days 2025 - 2025      None            Vital signs:  Temp:  [98.1 °F (36.7 °C)-98.8 °F (37.1 °C)] 98.2 °F (36.8 °C)  Pulse:  [58-74] 68  Resp:  [18-20] 18  BP: ()/(44-80) 146/74  SpO2:  [94 %-95 %] 95 %    Physical Exam:    General: No acute distress   Lungs: clear to auscultation  Cardiovascular: S1, S2  Abdomen: Soft      -----------------------------------------------------------------------------------------------  PATIENT DISCHARGE INSTRUCTIONS: See electronic chart    Danny Henson MD    Total time spent on discharge plannin minutes     The  Century Cures Act makes medical notes like these available to patients in the interest of transparency. Please be advised this is a medical document. Medical documents are intended to carry relevant information, facts as evident, and the clinical opinion of the practitioner. The medical note is intended as peer to peer communication and may appear blunt or direct. It is written in medical language and may contain abbreviations or verbiage that are unfamiliar.     Electronically signed by Danny Henson MD on 2025 11:09 PM         REVIEWER COMMENTS

## 2025-06-03 ENCOUNTER — PATIENT OUTREACH (OUTPATIENT)
Dept: CASE MANAGEMENT | Age: 55
End: 2025-06-03

## 2025-06-03 ENCOUNTER — TELEPHONE (OUTPATIENT)
Dept: SURGERY | Facility: CLINIC | Age: 55
End: 2025-06-03

## 2025-06-03 DIAGNOSIS — N20.0 RIGHT RENAL STONE: Primary | ICD-10-CM

## 2025-06-03 NOTE — TELEPHONE ENCOUNTER
Needs urine culture 10-14 days prior to surgery.    Urology Surgery Request  Surgeon: BIebel  Location (if known): [facility]  Procedure: cystoscopy, right retrograde pyelogram, right ureteroscopy, laser lithotripsy, stone extraction, right ureteral stent placement   Anesthesia: General   Time Frame: per patient  Time required: 90 minutes  Diagnosis: right renal stone    Antibiotics: per hospital protocol unless checked below   ___ Levaquin 500 mg IV   ___ Gemcitabine 2g/mL NS bladder instillation to be given in OR    Estimated Post Op/Follow Up Appt: second stage of URS - ticket below      Urology Surgery Request  Surgeon: Biebel  Location (if known): [facility]  Procedure: cystoscopy, right retrograde pyelogram, right ureteroscopy, laser lithotripsy, stone extraction, right ureteral stent placement    Anesthesia: General   Time Frame: 2-3 weeks after first  Time required: 75 minutes  Diagnosis: right renal stones    Antibiotics: per hospital protocol unless checked below   ___ Levaquin 500 mg IV   ___ Gemcitabine 2g/mL NS bladder instillation to be given in OR    Estimated Post Op/Follow Up Appt: ~ 1 week for stent removal. Please schedule appointment at time of surgery scheduling.

## 2025-06-03 NOTE — PROGRESS NOTES
Hospital follow up.    TCC request.    Attempt #1:  Left message on voicemail for patient to call transitions specialist back to schedule follow up appointments. Provided Transitions specialist scheduling phone number [phone].

## 2025-06-04 NOTE — PROGRESS NOTES
Hospital follow-up appt / Discharged [facility]      TCC Request :  [facility]  [address] [place] [address] [phone]  Schedule an appointment as soon as possible for a visit            Attempt #2:  Left message on voicemail for patient to call transitions specialist back to schedule follow up appointments. Provided Transitions specialist scheduling phone number [phone].

## 2025-06-05 NOTE — PROGRESS NOTES
TCC appointment request (discharged 06/01)    [facility]  [address] [place] [id number]  [phone]  Multiple attempts w/no calls back; no appointment made    Attempt #3:  Left message on voicemail for patient to call transitions specialist back to schedule follow up appointments. Provided Transitions specialist scheduling phone number [phone]. Closing encounter. Will re-open if patient returns call.

## 2025-06-06 NOTE — TELEPHONE ENCOUNTER
2ND SURGERY    Spoke with the patient.  Scheduling the surgery for 7/9/25. Also scheduled a  week f/u 7/25/25 @ 8am   Reviewed the pre-op instructions and will send via My Chart or mail to patient once confirmed.  Patient can contact me at [phone]

## 2025-06-06 NOTE — TELEPHONE ENCOUNTER
Spoke with the patient.  Scheduling the surgery for 6/18/25.  Reviewed the pre-op instructions and will send via My Chart or mail to patient once confirmed.  Patient can contact me at [phone]

## 2025-06-10 NOTE — PROGRESS NOTES
Transitional Care Management   Discharge Date: 25  Contact Date: 2025    Assessment:  TCM Initial Assessment    General:  Assessment completed with: Patient  Patient Subjective: Pt doing ok and says she is following up with Urology.  Chief Complaint: JORGE LUIS (acute kidney injury)  Verify patient name and  with patient/ caregiver: Yes    Hospital Stay/Discharge:  Prior to leaving the hospital were your Discharge Instructions reviewed with you?: Yes  Did you receive a copy of your written Discharge Instructions?: Yes  Do you feel better or worse since you left the hospital or emergency department?: Better    Follow - Up Appointment:  Do you have a follow-up appointment?: No    Home Health/DME:  Prior to leaving the hospital was Home Health (HH) arranged for you?: No     Prior to leaving the hospital or emergency department was Durable Medical Equipment (DME), medical supplies, or infusions arranged for you?: No  Are DME/medical supply/infusions needs identified by staff during this assessment?: No     Medications/Diet:       Were you given a different diet per your Discharge Instructions?: No     Questions/Concerns:  Do you have any questions or concerns that have not been discussed?: No       Follow-up Appointments:  Your appointments       Date & Time Appointment Department (Center)    Sep 29, 2025 1:30 PM CDT Follow Up Visit with Kip Ann MD [facility] ([place] [address])              [place] [address]  [address]  [facility] [address]  [phone]            [facility]  Was TCC Ordered: Yes  Was TCC Scheduled: No, Explain following Urology.      Specialist  Does the patient have any other follow-up appointment(s) that need to be scheduled? Yes   -If yes: Care Manager reviewed upcoming specialist appointments with patient: Yes   -Does the patient need assistance  scheduling appointment(s): No      Book By Date: 6/15/25

## 2025-06-12 ENCOUNTER — TELEPHONE (OUTPATIENT)
Dept: SURGERY | Facility: CLINIC | Age: 55
End: 2025-06-12

## 2025-06-12 DIAGNOSIS — R82.90 URINE FINDING: Primary | ICD-10-CM

## 2025-06-12 NOTE — TELEPHONE ENCOUNTER
RN called patient to remind to give urine sample for culture before 6/18 surgery  No answer. Left message.  Order Cx placed   RN reached out via [employer]

## 2025-06-16 NOTE — TELEPHONE ENCOUNTER
This encounter is now closed.     CULTURE, URINE, ROUTINE SEE NOTE    Comment:    CULTURE, URINE, ROUTINE       Micro Number:      [id number]    Test Status:       Final    Specimen Source:   Urine, clean catch    Specimen Quality:  Adequate    Result:            No Growth     Specimen Collected: 06/13/25 12:33 PM Last Resulted: 06/15/25  7:31 AM

## 2025-06-17 ENCOUNTER — TELEPHONE (OUTPATIENT)
Dept: SURGERY | Facility: CLINIC | Age: 55
End: 2025-06-17

## 2025-06-17 DIAGNOSIS — R82.90 URINE FINDING: Primary | ICD-10-CM

## 2025-06-17 NOTE — H&P
UROLOGY HISTORY & PHYSICAL      Kirstin Myers Patient Status:  Hospital Outpatient Surgery    3/24/1970 MRN [id number]   [facility] SURGERY Attending Kip Ann MD   Hosp Day # 0 PCP None Pcp       Chief Complaint:   Nephrolithiasis     HPI & Assessment:   Kirstin Myers is a 54 year old female with history of diabetes who was seen for nephrolithiasis.  She presented with left flank pain and signs of infection.  CT 2024 shows moderate to severe hydronephrosis related to a large left obstructing partial staghorn UPJ stone measuring 4.3 cm, as well as a 7 mm lower pole stone.  No right renal stones present.      Repeat CT scan after stent showed complete resolution of hydronephrosis, less parenchymal thinning than prior.  MAG3 renal scan on 3/22/2024 showed 25% function of the left kidney and 75% function of the right kidney.  Post-Lasix left T1/2 was 26 minutes on the left.    Given decent function of the left kidney I discussed her options again of PCNL versus nephrectomy.  I do think it is worth saving his kidney and perform PCNL given 25% function.  The obstruction noted on renal scan could be related to the large stone burden.     I brought her to the OR on 4/15/2024 for stent exchange.  Her stent was significantly encrusted and needed to use a stone  to treat a large stone within the bladder around the distal coil of the stent.  She was scheduled for PCNL a few weeks after that, but needed to cancel due to upper respiratory infection.     I brought her to the OR on 7/10/2024 for cystolitholapaxy of large distal stent coil stone, bladder fulguration, left PCNL.  She did well and was discharged on postoperative day 1.  Postoperative CT shows an 8 mm left lower pole stone fragment so I brought her back to the OR on 2024 for cystoscopy, left ureteroscopy, laser lithotripsy, stent exchange.  Stent removed in clinic 2024.     24-hour urine study shows slightly high urine  calcium, slightly high urine oxalate, slightly low urine pH, slightly high uric acid.  Patient plans to make dietary modifications and repeat 24-hour urine study in about 1 year.    She presented to the hospital 5/29/25 with flank pain and was found to have 1.5 cm RLP and 1.3 cm R renal pelvis stones.     24 Hour Metabolic Urine Study Results     Volume: 2050 mL/day  Goal >2500  Sodium: 137 mmol/day  Goal <150  Calcium: 356 mg/day  Goal <250  Citrate: 596 mg/day  Goal >450  Oxalate: 59 mg/day  Goal <40  pH: 5.665  Goal 5.8 - 6.2 (>6 for UA stones, >7 for cystine stone)  Creatinine: 1884 mg/day  Normal 18-20 mg/kilogram/day (female)  Normal 20-22 mg/kilogram/day (male)  Uric acid: 990 mg/day  Goal <750  Cystinuria present? No     Stone analysis: 100% struvite   Serum calcium: 8.8    Urine culture negative on 6/13/25.    Plan:   - OR for cystoscopy, right ureteroscopy, laser lithotripsy, stone extraction, stent placement  - Informed consent obtained - risks and benefits explained, all questions answered       History:   Past Medical History[1]    Past Surgical History[2]    Family History[3]    Short Social Hx on File[4]    Medications (Active prior to today's visit):  Current Medications[5]    Allergies:  Allergies[6]    Review of Systems:   A comprehensive 10-point review of systems was completed.  Pertinent positives and negatives are noted in the the HPI.    Physical Exam:   Vital Signs:  Height 5' 9\" (1.753 m), weight 280 lb (127 kg), last menstrual period 04/01/2023.     CONSTITUTIONAL: Well developed, well nourished, in no acute distress  NEUROLOGIC: Alert and oriented  HEAD: Normocephalic, atraumatic  EYES: Sclera non-icteric  ENT: Hearing intact, moist mucous membranes  NECK: No obvious goiter or masses  RESPIRATORY: Normal respiratory effort  SKIN: No evident rashes  ABDOMEN: Soft, non-tender, non-distended    Laboratory Data:  Lab Results   Component Value Date    WBC 11.4 (H) 06/01/2025    HGB 11.8 (L)  06/01/2025    .0 06/01/2025     Lab Results   Component Value Date     06/01/2025    K 4.5 06/01/2025     06/01/2025    CO2 22.0 06/01/2025    BUN 35 (H) 06/01/2025     (H) 06/01/2025    AST 28 05/31/2025    ALT 30 05/31/2025    TP 6.7 05/31/2025    ALB 3.5 05/31/2025    PHOS 3.5 05/30/2025    CA 9.2 06/01/2025    MG 2.2 05/30/2025       Urinalysis Results (last three years):  Recent Labs     01/22/24  0050 02/20/24  1209 03/05/24  1321 07/23/24  1120 09/03/24  1503 12/30/24  1839 03/18/25  1443 03/18/25  1509 05/29/25  1820   COLORUR Yellow  --   --   --   --  Yellow  --   --  Dark-Yellow   CLARITY Turbid*  --   --   --   --  Turbid*  --   --  Turbid*   SPECGRAVITY 1.020 1.025 1.025 1.020 1.020 1.014 1.015  --  1.025   PHURINE 6.5 6.0 7.0 6.5 7.0 7.5 7.0  --  7.0   PROUR 50*  --   --   --   --  20*  --   --  >=300*   GLUUR Normal  --   --   --   --  Normal  --   --  Negative   KETUR Negative  --   --   --   --  Negative  --   --  Trace*   BILUR Negative  --   --   --   --  Negative  --   --   --    BLOODURINE Negative  --   --   --   --  Trace*  --   --  Moderate*   NITRITE 2+* Negative Negative Negative Negative Negative Negative  --  Negative   UROBILINOGEN 3*  --   --   --   --  Normal  --   --  1.0*   LEUUR 500*  --   --   --   --  250*  --   --  Large*   WBCUR >50*  --   --   --   --  >50*  --  21-50* >50*  >50*   RBCUR 3-5*  --   --   --   --  6-10*  --  3-5* None Seen  None Seen   BACUR 3+*  --   --   --   --  1+*  --  None Seen 3+*  3+*       Urine Culture Results (last three years):  Lab Results   Component Value Date    URINECUL >100,000 CFU/ML Proteus mirabilis (A) 05/29/2025    URINECUL >100,000 CFU/ML Proteus mirabilis (A) 03/18/2025    URINECUL  03/05/2024     >100,000 cfu/ml Multiple species present- probable contamination.    URINECUL No Growth 2 Days 02/20/2024    URINECUL >100,000 CFU/ML Escherichia coli (A) 01/22/2024    URINECUL >100,000 CFU/ML Proteus mirabilis (A)  2024    URINECUL >100,000 CFU/ML Escherichia coli (A) 2024       PSA:  No results found for: \"PSA\", \"PERCENTPSA\", \"PSAS\", \"PSAULTRA\"     Imaging (last three days):  No results found.       I have personally reviewed all relevant medical records, labs, and imaging.     Kip Ann MD  Staff Urologist  [facility]  Office: [phone]             [1]   Past Medical History:   Calculus of kidney    Diabetes (HCC)    Renal disorder    Visual impairment    GLASSES   [2]   Past Surgical History:  Procedure Laterality Date          Cystoscopy,insert ureteral stent      7/10/24 LEFT percutaneous nephrolithotomy    Hernia surgery            Other surgical history  2024    kidney stone    Other surgical history  04/15/2024    stent exchange    Other surgical history  07/10/2024    NEPHROSTOMY TUBE IN  PLACE    Repair ing hernia,5+y/o,reducibl      Ventral hernia repair N/A 2016    Procedure: HERNIA VENTRAL REPAIR;  Surgeon: Vijay Randall MD;  Location: [facility] OR   [3]   Family History  Problem Relation Age of Onset    Cancer Mother     Heart Disease Maternal Grandfather    [4]   Social History  Socioeconomic History    Marital status:    Tobacco Use    Smoking status: Every Day     Current packs/day: 1.00     Average packs/day: 1 pack/day for 30.0 years (30.0 ttl pk-yrs)     Types: Cigarettes     Passive exposure: Current   Vaping Use    Vaping status: Former   Substance and Sexual Activity    Alcohol use: Not Currently    Drug use: No     Social Drivers of Health     Food Insecurity: No Food Insecurity (2025)    NCSS - Food Insecurity     Worried About Running Out of Food in the Last Year: No     Ran Out of Food in the Last Year: No   Transportation Needs: No Transportation Needs (2025)    NCSS - Transportation     Lack of Transportation: No   Housing Stability: Not At Risk (2025)    NCSS - Housing/Utilities     Has Housing: Yes     Worried About  Losing Housing: No     Unable to Get Utilities: No   [5]   Current Outpatient Medications   Medication Sig Dispense Refill    VITAMIN E OR daily.      NON FORMULARY Take 1 tablet by mouth in the morning. Product: Pure Synergy.      cholecalciferol 50 MCG (2000 UT) Oral Cap Take 1 capsule (2,000 Units total) by mouth in the morning.      B Complex Vitamins (B COMPLEX OR) Take 1 tablet by mouth in the morning.      Omega-3 Fatty Acids (OMEGA-3 FISH OIL OR) Take 1 capsule by mouth in the morning.      oxybutynin ER 10 MG Oral Tablet 24 Hr Take 1 tablet (10 mg total) by mouth at bedtime.      Glucose Blood (ONETOUCH VERIO) In Vitro Strip Test blood sugar daily (before breakfast or before dinner) 200 strip 3    Glucose Blood (ONETOUCH VERIO) In Vitro Strip Check blood sugar twice daily 200 strip 3    OneTouch Delica Lancets 30G Does not apply Misc 1 each 2 (two) times daily. 200 each 3    Blood Glucose Monitoring Suppl (ONETOUCH VERIO FLEX SYSTEM) w/Device Does not apply Kit 1 kit every morning before breakfast. Test blood sugar daily (before breakfast or before dinner) 1 kit 0    Lancets (ONETOUCH DELICA PLUS MGWMEX52K) Does not apply Misc 1 Lancet daily. Test blood sugar daily (before breakfast or before dinner) 100 each 0    Multiple Vitamins-Minerals (CENTRUM SILVER 50+WOMEN) Oral Tab Take 1 tablet by mouth in the morning.      CINNAMON OR Take 1 tablet by mouth in the morning.      NON FORMULARY Take 1 Scoop by mouth in the morning. Product: Ka'Donis powder.      NON FORMULARY Take by mouth in the morning. Beet root powder.      Fluticasone Propionate 50 MCG/ACT Nasal Suspension 1 spray by Each Nare route daily as needed for Rhinitis or Allergies.      Bromelains (BROMELAIN OR) Take 1 tablet by mouth in the morning.      COLLAGEN OR Take 1 tablet by mouth in the morning.      Green Tea, Camillia sinensis, (GREEN TEA OR) Take 1 tablet by mouth in the morning.      Probiotic Product (PROBIOTIC DAILY OR) Take 1 capsule  by mouth in the morning.     [6] No Known Allergies

## 2025-06-18 ENCOUNTER — ANESTHESIA (OUTPATIENT)
Dept: SURGERY | Facility: HOSPITAL | Age: 55
End: 2025-06-18
Payer: MEDICAID

## 2025-06-18 ENCOUNTER — APPOINTMENT (OUTPATIENT)
Dept: GENERAL RADIOLOGY | Facility: HOSPITAL | Age: 55
End: 2025-06-18
Attending: SURGERY
Payer: MEDICAID

## 2025-06-18 ENCOUNTER — HOSPITAL ENCOUNTER (OUTPATIENT)
Facility: HOSPITAL | Age: 55
Setting detail: HOSPITAL OUTPATIENT SURGERY
Discharge: HOME | End: 2025-06-18
Attending: SURGERY | Admitting: SURGERY
Payer: MEDICAID

## 2025-06-18 ENCOUNTER — ANESTHESIA EVENT (OUTPATIENT)
Dept: SURGERY | Facility: HOSPITAL | Age: 55
End: 2025-06-18
Payer: MEDICAID

## 2025-06-18 VITALS
HEART RATE: 79 BPM | WEIGHT: 273.38 LBS | RESPIRATION RATE: 14 BRPM | HEIGHT: 69 IN | BODY MASS INDEX: 40.49 KG/M2 | DIASTOLIC BLOOD PRESSURE: 74 MMHG | SYSTOLIC BLOOD PRESSURE: 138 MMHG | TEMPERATURE: 97 F | OXYGEN SATURATION: 93 %

## 2025-06-18 DIAGNOSIS — N20.0 RIGHT RENAL STONE: ICD-10-CM

## 2025-06-18 LAB — GLUCOSE BLD-MCNC: 108 MG/DL (ref 70–99)

## 2025-06-18 PROCEDURE — 52356 CYSTO/URETERO W/LITHOTRIPSY: CPT | Performed by: SURGERY

## 2025-06-18 PROCEDURE — 74420 UROGRAPHY RTRGR +-KUB: CPT | Performed by: SURGERY

## 2025-06-18 DEVICE — URETERAL STENT
Type: IMPLANTABLE DEVICE | Site: URETER | Status: FUNCTIONAL
Brand: ASCERTA™

## 2025-06-18 RX ORDER — SCOPOLAMINE 1 MG/3D
1 PATCH, EXTENDED RELEASE TRANSDERMAL ONCE
Status: DISCONTINUED | OUTPATIENT
Start: 2025-06-18 | End: 2025-06-18 | Stop reason: HOSPADM

## 2025-06-18 RX ORDER — HYDROCODONE BITARTRATE AND ACETAMINOPHEN 5; 325 MG/1; MG/1
2 TABLET ORAL ONCE AS NEEDED
Refills: 0 | Status: DISCONTINUED | OUTPATIENT
Start: 2025-06-18 | End: 2025-06-18

## 2025-06-18 RX ORDER — CEFAZOLIN SODIUM IN 0.9 % NACL 3 G/100 ML
3 INTRAVENOUS SOLUTION, PIGGYBACK (ML) INTRAVENOUS ONCE
Status: COMPLETED | OUTPATIENT
Start: 2025-06-18 | End: 2025-06-18

## 2025-06-18 RX ORDER — KETOROLAC TROMETHAMINE 30 MG/ML
INJECTION, SOLUTION INTRAMUSCULAR; INTRAVENOUS AS NEEDED
Status: DISCONTINUED | OUTPATIENT
Start: 2025-06-18 | End: 2025-06-18 | Stop reason: SURG

## 2025-06-18 RX ORDER — HYDROMORPHONE HYDROCHLORIDE 1 MG/ML
0.4 INJECTION, SOLUTION INTRAMUSCULAR; INTRAVENOUS; SUBCUTANEOUS EVERY 5 MIN PRN
Refills: 0 | Status: DISCONTINUED | OUTPATIENT
Start: 2025-06-18 | End: 2025-06-18

## 2025-06-18 RX ORDER — NICOTINE POLACRILEX 4 MG
30 LOZENGE BUCCAL
Status: DISCONTINUED | OUTPATIENT
Start: 2025-06-18 | End: 2025-06-18

## 2025-06-18 RX ORDER — HYDROCODONE BITARTRATE AND ACETAMINOPHEN 5; 325 MG/1; MG/1
1 TABLET ORAL ONCE AS NEEDED
Refills: 0 | Status: DISCONTINUED | OUTPATIENT
Start: 2025-06-18 | End: 2025-06-18

## 2025-06-18 RX ORDER — SODIUM CHLORIDE, SODIUM LACTATE, POTASSIUM CHLORIDE, CALCIUM CHLORIDE 600; 310; 30; 20 MG/100ML; MG/100ML; MG/100ML; MG/100ML
INJECTION, SOLUTION INTRAVENOUS CONTINUOUS
Status: DISCONTINUED | OUTPATIENT
Start: 2025-06-18 | End: 2025-06-18

## 2025-06-18 RX ORDER — SULFAMETHOXAZOLE AND TRIMETHOPRIM 800; 160 MG/1; MG/1
1 TABLET ORAL 2 TIMES DAILY
Qty: 4 TABLET | Refills: 0 | Status: SHIPPED | OUTPATIENT
Start: 2025-06-18 | End: 2025-06-20

## 2025-06-18 RX ORDER — ONDANSETRON 2 MG/ML
INJECTION INTRAMUSCULAR; INTRAVENOUS AS NEEDED
Status: DISCONTINUED | OUTPATIENT
Start: 2025-06-18 | End: 2025-06-18 | Stop reason: SURG

## 2025-06-18 RX ORDER — LIDOCAINE HYDROCHLORIDE 10 MG/ML
INJECTION, SOLUTION EPIDURAL; INFILTRATION; INTRACAUDAL; PERINEURAL AS NEEDED
Status: DISCONTINUED | OUTPATIENT
Start: 2025-06-18 | End: 2025-06-18 | Stop reason: SURG

## 2025-06-18 RX ORDER — PROCHLORPERAZINE EDISYLATE 5 MG/ML
5 INJECTION INTRAMUSCULAR; INTRAVENOUS EVERY 8 HOURS PRN
Status: DISCONTINUED | OUTPATIENT
Start: 2025-06-18 | End: 2025-06-18

## 2025-06-18 RX ORDER — TAMSULOSIN HYDROCHLORIDE 0.4 MG/1
0.4 CAPSULE ORAL EVERY EVENING
Qty: 14 CAPSULE | Refills: 0 | Status: SHIPPED | OUTPATIENT
Start: 2025-06-18 | End: 2025-07-02

## 2025-06-18 RX ORDER — LABETALOL HYDROCHLORIDE 5 MG/ML
5 INJECTION, SOLUTION INTRAVENOUS EVERY 5 MIN PRN
Status: DISCONTINUED | OUTPATIENT
Start: 2025-06-18 | End: 2025-06-18

## 2025-06-18 RX ORDER — ROCURONIUM BROMIDE 10 MG/ML
INJECTION, SOLUTION INTRAVENOUS AS NEEDED
Status: DISCONTINUED | OUTPATIENT
Start: 2025-06-18 | End: 2025-06-18 | Stop reason: SURG

## 2025-06-18 RX ORDER — KETOROLAC TROMETHAMINE 10 MG/1
10 TABLET, FILM COATED ORAL EVERY 8 HOURS PRN
Qty: 10 TABLET | Refills: 0 | Status: SHIPPED | OUTPATIENT
Start: 2025-06-18

## 2025-06-18 RX ORDER — HYDROMORPHONE HYDROCHLORIDE 1 MG/ML
0.6 INJECTION, SOLUTION INTRAMUSCULAR; INTRAVENOUS; SUBCUTANEOUS EVERY 5 MIN PRN
Refills: 0 | Status: DISCONTINUED | OUTPATIENT
Start: 2025-06-18 | End: 2025-06-18

## 2025-06-18 RX ORDER — DEXTROSE MONOHYDRATE 25 G/50ML
50 INJECTION, SOLUTION INTRAVENOUS
Status: DISCONTINUED | OUTPATIENT
Start: 2025-06-18 | End: 2025-06-18

## 2025-06-18 RX ORDER — IOPAMIDOL 612 MG/ML
INJECTION, SOLUTION INTRAVASCULAR AS NEEDED
Status: DISCONTINUED | OUTPATIENT
Start: 2025-06-18 | End: 2025-06-18 | Stop reason: HOSPADM

## 2025-06-18 RX ORDER — METOCLOPRAMIDE HYDROCHLORIDE 5 MG/ML
INJECTION INTRAMUSCULAR; INTRAVENOUS AS NEEDED
Status: DISCONTINUED | OUTPATIENT
Start: 2025-06-18 | End: 2025-06-18 | Stop reason: SURG

## 2025-06-18 RX ORDER — PHENAZOPYRIDINE HYDROCHLORIDE 100 MG/1
100 TABLET, FILM COATED ORAL 3 TIMES DAILY PRN
Qty: 10 TABLET | Refills: 0 | Status: SHIPPED | OUTPATIENT
Start: 2025-06-18

## 2025-06-18 RX ORDER — NALOXONE HYDROCHLORIDE 0.4 MG/ML
80 INJECTION, SOLUTION INTRAMUSCULAR; INTRAVENOUS; SUBCUTANEOUS AS NEEDED
Status: DISCONTINUED | OUTPATIENT
Start: 2025-06-18 | End: 2025-06-18

## 2025-06-18 RX ORDER — ACETAMINOPHEN 500 MG
1000 TABLET ORAL ONCE
Status: DISCONTINUED | OUTPATIENT
Start: 2025-06-18 | End: 2025-06-18 | Stop reason: HOSPADM

## 2025-06-18 RX ORDER — DEXAMETHASONE SODIUM PHOSPHATE 4 MG/ML
VIAL (ML) INJECTION AS NEEDED
Status: DISCONTINUED | OUTPATIENT
Start: 2025-06-18 | End: 2025-06-18 | Stop reason: SURG

## 2025-06-18 RX ORDER — ONDANSETRON 2 MG/ML
4 INJECTION INTRAMUSCULAR; INTRAVENOUS EVERY 6 HOURS PRN
Status: DISCONTINUED | OUTPATIENT
Start: 2025-06-18 | End: 2025-06-18

## 2025-06-18 RX ORDER — HYDROMORPHONE HYDROCHLORIDE 1 MG/ML
0.2 INJECTION, SOLUTION INTRAMUSCULAR; INTRAVENOUS; SUBCUTANEOUS EVERY 5 MIN PRN
Refills: 0 | Status: DISCONTINUED | OUTPATIENT
Start: 2025-06-18 | End: 2025-06-18

## 2025-06-18 RX ORDER — ACETAMINOPHEN 500 MG
1000 TABLET ORAL ONCE AS NEEDED
Status: DISCONTINUED | OUTPATIENT
Start: 2025-06-18 | End: 2025-06-18

## 2025-06-18 RX ORDER — NICOTINE POLACRILEX 4 MG
15 LOZENGE BUCCAL
Status: DISCONTINUED | OUTPATIENT
Start: 2025-06-18 | End: 2025-06-18

## 2025-06-18 RX ADMIN — METOCLOPRAMIDE HYDROCHLORIDE 10 MG: 5 INJECTION INTRAMUSCULAR; INTRAVENOUS at 15:54:00

## 2025-06-18 RX ADMIN — ROCURONIUM BROMIDE 50 MG: 10 INJECTION, SOLUTION INTRAVENOUS at 15:51:00

## 2025-06-18 RX ADMIN — CEFAZOLIN SODIUM IN 0.9 % NACL 3 G: 3 G/100 ML INTRAVENOUS SOLUTION, PIGGYBACK (ML) INTRAVENOUS at 15:55:00

## 2025-06-18 RX ADMIN — DEXAMETHASONE SODIUM PHOSPHATE 8 MG: 4 MG/ML VIAL (ML) INJECTION at 15:54:00

## 2025-06-18 RX ADMIN — ONDANSETRON 4 MG: 2 INJECTION INTRAMUSCULAR; INTRAVENOUS at 15:54:00

## 2025-06-18 RX ADMIN — LIDOCAINE HYDROCHLORIDE 50 MG: 10 INJECTION, SOLUTION EPIDURAL; INFILTRATION; INTRACAUDAL; PERINEURAL at 15:51:00

## 2025-06-18 RX ADMIN — KETOROLAC TROMETHAMINE 30 MG: 30 INJECTION, SOLUTION INTRAMUSCULAR; INTRAVENOUS at 16:52:00

## 2025-06-18 RX ADMIN — SODIUM CHLORIDE, SODIUM LACTATE, POTASSIUM CHLORIDE, CALCIUM CHLORIDE: 600; 310; 30; 20 INJECTION, SOLUTION INTRAVENOUS at 15:45:00

## 2025-06-18 RX ADMIN — SODIUM CHLORIDE, SODIUM LACTATE, POTASSIUM CHLORIDE, CALCIUM CHLORIDE: 600; 310; 30; 20 INJECTION, SOLUTION INTRAVENOUS at 16:02:00

## 2025-06-18 NOTE — ANESTHESIA POSTPROCEDURE EVALUATION
[facility]    Kirstin Myers Patient Status:  Hospital Outpatient Surgery   Age/Gender 55 year old female MRN [id number]   Location [facility] SURGERY Attending Kip Ann MD   Hosp Day # 0 PCP None Pcp       Anesthesia Post-op Note    CYSTOSCOPY, RIGHT RETROGRADE PYELOGRAM, RIGHT URETEROSCOPY, LASER LITHOTRIPSY, STONE EXTRACTION, RIGHT URETERAL STENT PLACEMENT    Procedure Summary       Date: 06/18/25 Room / Location:  MAIN OR 07 / [facility] OR; [facility] X-ray    Anesthesia Start: 1545 Anesthesia Stop: 1708    Procedures:       CYSTOSCOPY, RIGHT RETROGRADE PYELOGRAM, RIGHT URETEROSCOPY, LASER LITHOTRIPSY, STONE EXTRACTION, RIGHT URETERAL STENT PLACEMENT (Right: Ureter)      XR OR - N/C Diagnosis:       Right renal stone      (Right renal stone [N20.0])      (CYSTOSCOPY, RIGHT RETROGRADE PYELOGRAM, RIGHT URETEROSCOPY, LASER LITHOTRIPSY, STONE EXTRACTION, RIGHT URETERAL STENT PLACEMENT)    Scheduled Providers: Kip Ann MD Anesthesiologist: Michelle Mcnamara MD    Anesthesia Type: general ASA Status: 3            Anesthesia Type: general    Vitals Value Taken Time   /71 06/18/25 17:08   Temp 98 06/18/25 17:08   Pulse 80 06/18/25 17:08   Resp 20 06/18/25 17:08   SpO2 92 06/18/25 17:08           Patient Location: PACU    Anesthesia Type: general    Airway Patency: patent    Postop Pain Control: adequate    Mental Status: preanesthetic baseline    Nausea/Vomiting: none    Cardiopulmonary/Hydration status: stable euvolemic    Complications: no apparent anesthesia related complications    Postop vital signs: stable    Dental Exam: Unchanged from Preop    Patient to be discharged home when criteria met.

## 2025-06-18 NOTE — OPERATIVE REPORT
Urology Operative Note    Attending Surgeon: Kip Ann MD    Assistant Surgeon: None    Patient Name: Kirstin Myers    Date of Surgery: 6/18/2025    Preoperative Diagnosis: Right nephrolithiasis    Postoperative Diagnosis: Same    Procedure Performed:   Cystoscopy, RIGHT ureteroscopy, laser lithotripsy, basket stone extraction, retrograde pyelogram, ureteral stent placement    Indication:  Patient is a 55 year old female who presented with two large right renal stones. The patient was counseled on options, risks, and benefits and elected to undergo the above procedure. We discussed risks including, but not limited to, bleeding, infection, damage to surrounding structures, need for repeat procedure(s). The patient understood these risks and wished to proceed with surgery.    Findings:  Cystoscopy - normal urethra without strictures, normal bladder. Two large stones in right kidney ~80% treated, one stone was surrounded by a lot of matrix material.    Procedure:  The patient was taken to the operating room and a timeout was performed confirming the correct patient and procedure. The patient was prepped and draped in lithotomy position after undergoing general anesthesia. Pre-operative prophylactic antibiotics were given in the form of Ancef.    The cystoscope was inserted per urethra and the bladder was inspected and drained. The right ureter was cannulated with a Sensor wire, and the wire was passed into the renal pelvis which I confirmed using fluoroscopy.     A 12/14 Swazi ureteral access sheath was inserted over the first wire. It was advanced without resistance to the mid ureter. The inner cannula was removed and a second wire was inserted through the sheath and into the kidney, which was confirmed fluoroscopically.  The access sheath was removed and the second safety wire was secured to the drape.  The access sheath was then reinserted over the first working wire up to the proximal ureter. The wire and  inner cannula of the access sheath were removed, leaving the safety wire in place alongside the access sheath.     The flexible ureteroscope was advanced into the sheath and up into the renal pelvis. Stone(s) was/were seen in the upper and lower pole of the kidney. The stone(s) was/were fragmented using a laser, and then the fragments were extracted using a zero tip basket. Any smaller remaining stone fragments were lasered to dust, and thoroughly irrigated. All renal calyces were surveyed once more, and no large fragments were seen. A retrograde pyelogram was performed through the scope and showed no extravasation. The ureter was inspected while slowly removing the scope and access sheath, and it appeared healthy without stone fragments seen.    A 6-Maltese x 26 cm JJ ureteral stent was inserted over the remaining wire. The proximal coil was formed in the kidney under fluoroscopic guidance. The distal coil was formed within the bladder using the pusher and fluoroscopy. The stent string was removed prior to stent placement.    The bladder was drained and the cystoscope was removed. The patient was awoken from anesthesia and transferred to PACU in stable condition. The patient tolerated the procedure well. All instrument/supply counts were correct at the end of the case.    Specimens:   Stone fragments for chemical analysis    Estimated Blood Loss:  1 mL    Tubes/Drains:  6-Maltese x 26 cm JJ right ureteral stent    Complications:   None immediate    Condition from OR:  Stable    Plan:   Second look ureteroscopy as planned in a few weeks.      Kip Ann MD  Staff Urologist  [facility]  Office: [phone]

## 2025-06-18 NOTE — DISCHARGE INSTRUCTIONS
You had cystoscopy, ureteroscopy, and stent in the operating room today.    Instructions:    - No heavy lifting or strenuous activity for 1 day. You may resume regular activity tomorrow.  With increased activity you may notice more blood in the urine from stent movement inside the bladder.    - Next surgery as planned 7/9/25.     - You have a stent (small plastic tube) inside your kidney and ureter to allow the swelling from surgery to resolve. This is only temporary and must be removed, so you should not forget about it. We will remove your stent via a brief cystoscopy in clinic when you return. If you have to miss this appointment for some reason please make sure you re-schedule it.     - With a ureteral stent in place you may have some discomfort on your side/flank. You can feel pain worsen when you urinate, so try to avoid holding urine and void every 2-3 hours. You also may notice increased blood in the urine as you increase your activity. If this happens increase your hydration and take it easy for a day. Warm baths/hot packs can help with the discomfort as well as your prescribed medications and Advil/Motrin (if you are able to take these).     - You may experience mild pain after the procedure for a few days.  If the pain becomes intolerable please contact our office or go to the nearest Emergency Room or Urgent Care. You should take over the counter ibuprofen (AKA motrin, advil) for mild pain (provided you do not have a medical condition such as stomach ulcers or kidney disease which prohibits you from taking these). You may alternate this with tylenol as well. If pain is still not relieved by tylenol and/or ibuprofen, you may take narcotic pain medication if prescribed (typically oxycodone or tramadol). If you are taking narcotic pain medication this can make you constipated, so you should take over the counter stool softeners or miralax if prescribed.    - Warm packs over the lower abdomen or hot baths  often help with discomfort after cystoscopy.    - If you take blood thinners (such as aspirin or plavix) please hold these medications until 3 days after surgery.     - You may experience burning and frequency of urination over the next few days. This will improve after a few days if you stay well hydrated. If you were prescribed phenazopyridine (Pyridium) this may relieve urinary discomfort but you can only take this for 3 days. Pyridium will make your urine orange.     - You are likely to see some blood in your urine (pink or light red urine) that should clear up within a few days. Staying well hydrated should help this clear up. If you notice the urine stays dark red or there are multiple large blood clots despite good hydration, please call the urology clinic ([phone]).     - Try to abstain from alcohol, coffee, tea, artificial sweeteners, and spicy food for the next 48 hours as these can irritate the bladder.     - If you develop fevers / chills, difficulty urinating, or abdominal pain that does not improve with pain medications, please call the office.     - Drink 1.5 to 2 liters of fluid today (water is preferable). If you are on a fluid restriction due to other medical reasons then you need to adhere to your fluid restriction recommendations.    Kip Ann MD  Staff Urologist  [facility]  Office: [phone]      You received a drug called Toradol which is an Anti Inflammatory at:4:50 pm  If you are allowed to take Anti inflammatories:    Do not take any Anti Inflammatory like Motrin, Aleve or Ibuprophen until after: 10:50 pm  Please report any suspected allergic reactions or bleeding issues to your doctor

## 2025-06-18 NOTE — ANESTHESIA PREPROCEDURE EVALUATION
PRE-OP EVALUATION    Patient Name: Kirstin Myers    Admit Diagnosis: Right renal stone [N20.0]    Pre-op Diagnosis: Right renal stone [N20.0]    CYSTOSCOPY, RIGHT RETROGRADE PYELOGRAM, RIGHT URETEROSCOPY, LASER LITHOTRIPSY, STONE EXTRACTION, RIGHT URETERAL STENT PLACEMENT    Anesthesia Procedure: CYSTOSCOPY, RIGHT RETROGRADE PYELOGRAM, RIGHT URETEROSCOPY, LASER LITHOTRIPSY, STONE EXTRACTION, RIGHT URETERAL STENT PLACEMENT (Right)    Surgeons and Role:     * Kip Ann MD - Primary    Pre-op vitals reviewed.  Temp: 99 °F (37.2 °C)  Pulse: 73  Resp: 18  SpO2: 94 %  Body mass index is 40.37 kg/m².    Current medications reviewed.  Hospital Medications:  Current Medications[1]    Outpatient Medications:   Prescriptions Prior to Admission[2]    Allergies: Patient has no known allergies.      Anesthesia Evaluation    Patient summary reviewed.    Anesthetic Complications  (-) history of anesthetic complications         GI/Hepatic/Renal  Comment: JORGE LUIS (acute kidney injury)  Bacteremia  Bladder stone  Hydronephrosis with urinary obstruction due to renal calculus               (+) chronic renal disease                    Cardiovascular    Negative cardiovascular ROS.    Exercise tolerance: good     MET: >4                                           Endo/Other      (+) diabetes  type 2, not using insulin                         Pulmonary  Comment: Smoker               (-) recent URI          Neuro/Psych    Negative neuro/psych ROS.                                  Past Surgical History[3]  Social Hx on file[4]  History   Drug Use No     Available pre-op labs reviewed.  Lab Results   Component Value Date    WBC 11.4 (H) 06/01/2025    RBC 4.13 06/01/2025    HGB 11.8 (L) 06/01/2025    HCT 37.7 06/01/2025    MCV 91.3 06/01/2025    MCH 28.6 06/01/2025    MCHC 31.3 06/01/2025    RDW 16.0 06/01/2025    .0 06/01/2025     Lab Results   Component Value Date     06/01/2025    K 4.5 06/01/2025     06/01/2025    CO2  22.0 2025    BUN 35 (H) 2025    CREATSERUM 1.75 (H) 2025     (H) 2025    CA 9.2 2025     Lab Results   Component Value Date    INR 1.02 2025         Airway      Mallampati: III  Mouth opening: 3 FB  TM distance: 4 - 6 cm  Neck ROM: full Cardiovascular    Cardiovascular exam normal.         Dental  Comment: Some missing, diseased, compromised teeth           Pulmonary    Pulmonary exam normal.                 Other findings              ASA: 3   Plan: general  NPO status verified and patient meets guidelines.    Post-procedure pain management plan discussed with surgeon and patient.    Comment: Plan for general anesthetic with endotracheal tube.  Risks and benefits pertaining to the proposed anesthetic and postoperative plan for pain, nausea/vomiting were discussed with patient.  Risks of general anesthetic include but not are not limited to adverse reactions related to medications administered, dental damage, and sore throat postoperatively.  Questions were answered and consent was signed.   Plan/risks discussed with: patient                Present on Admission:  **None**             [1]    acetaminophen (Tylenol Extra Strength) tab 1,000 mg  1,000 mg Oral Once    scopolamine (Transderm-Scop) 1 MG/3DAYS patch 1 patch  1 patch Transdermal Once    lactated ringers infusion   Intravenous Continuous    ceFAZolin (Ancef) 3 g in sodium chloride 0.9% 100mL IVPB premix  3 g Intravenous Once   [2]   Medications Prior to Admission   Medication Sig Dispense Refill Last Dose/Taking    Amylase-Lipase-Protease (DIGESTIVE ENZYMES OR) Take 1 tablet by mouth in the morning.   Past Week    VITAMIN E OR daily.   Past Week    [] cephALEXin 500 MG Oral Cap Take 1 capsule (500 mg total) by mouth 3 (three) times daily for 12 days. 36 capsule 0 Taking    oxybutynin ER 10 MG Oral Tablet 24 Hr Take 1 tablet (10 mg total) by mouth at bedtime.   Past Month    Glucose Blood (ONETOUCH VERIO) In  Vitro Strip Test blood sugar daily (before breakfast or before dinner) 200 strip 3 6/18/2025    Glucose Blood (ONETOUCH VERIO) In Vitro Strip Check blood sugar twice daily 200 strip 3 6/18/2025    OneTouch Delica Lancets 30G Does not apply Misc 1 each 2 (two) times daily. 200 each 3 6/18/2025    Blood Glucose Monitoring Suppl (ONETOUCH VERIO FLEX SYSTEM) w/Device Does not apply Kit 1 kit every morning before breakfast. Test blood sugar daily (before breakfast or before dinner) 1 kit 0 6/18/2025    Lancets (ONETOUCH DELICA PLUS [id number]) Does not apply Misc 1 Lancet daily. Test blood sugar daily (before breakfast or before dinner) 100 each 0 6/18/2025    Multiple Vitamins-Minerals (CENTRUM SILVER 50+WOMEN) Oral Tab Take 1 tablet by mouth in the morning.   Past Month    NON FORMULARY Take 1 tablet by mouth in the morning. Product: Pure Synergy.   Past Week    CINNAMON OR Take 1 tablet by mouth in the morning.   Past Month    NON FORMULARY Take 1 Scoop by mouth in the morning. Product: Ka'Donis powder.   Past Month    cholecalciferol 50 MCG (2000 UT) Oral Cap Take 1 capsule (2,000 Units total) by mouth in the morning.   Past Week    NON FORMULARY Take by mouth in the morning. Beet root powder.   Past Week    B Complex Vitamins (B COMPLEX OR) Take 1 tablet by mouth in the morning.   Past Month    Bromelains (BROMELAIN OR) Take 1 tablet by mouth in the morning.   Past Month    COLLAGEN OR Take 1 tablet by mouth in the morning.   Past Month    Green Tea, Camillia sinensis, (GREEN TEA OR) Take 1 tablet by mouth in the morning.   Past Month    Omega-3 Fatty Acids (OMEGA-3 FISH OIL OR) Take 1 capsule by mouth in the morning.   Past Week    Probiotic Product (PROBIOTIC DAILY OR) Take 1 capsule by mouth in the morning.   Past Month    Fluticasone Propionate 50 MCG/ACT Nasal Suspension 1 spray by Each Nare route daily as needed for Rhinitis or Allergies.   More than a month   [3]   Past Surgical History:  Procedure Laterality  Date          Cystoscopy,insert ureteral stent      7/10/24 LEFT percutaneous nephrolithotomy    Hernia surgery            Other surgical history  2024    kidney stone    Other surgical history  04/15/2024    stent exchange    Other surgical history  07/10/2024    NEPHROSTOMY TUBE IN  PLACE    Repair ing hernia,5+y/o,reducibl      Ventral hernia repair N/A 2016    Procedure: HERNIA VENTRAL REPAIR;  Surgeon: Vijay Randall MD;  Location:  MAIN OR   [4]   Social History  Socioeconomic History    Marital status:    Tobacco Use    Smoking status: Every Day     Current packs/day: 1.00     Average packs/day: 1 pack/day for 30.0 years (30.0 ttl pk-yrs)     Types: Cigarettes     Passive exposure: Current   Vaping Use    Vaping status: Former   Substance and Sexual Activity    Alcohol use: Not Currently    Drug use: No

## 2025-06-18 NOTE — ANESTHESIA PROCEDURE NOTES
Airway  Date/Time: 6/18/2025 3:52 PM  Reason: elective      General Information and Staff   Patient location during procedure: OR  Anesthesiologist: Michelle Mcnamara MD  Performed: anesthesiologist   Performed by: Michelle Mcnamara MD  Authorized by: Michelle Mcnamara MD        Indications and Patient Condition  Indications for airway management: anesthesia  Sedation level: deep      Preoxygenated: yesPatient position: sniffing    Mask difficulty assessment: 1 - vent by mask    Final Airway Details    Final airway type: endotracheal airway    Successful airway: ETT  Cuffed: yes   Successful intubation technique: direct laryngoscopy  Endotracheal tube insertion site: oral  Blade: Claudia  Blade size: #3  ETT size (mm): 7.0    Cormack-Lehane Classification: grade IIA - partial view of glottis  Placement verified by: capnometry   Cuff volume (mL): 6  Measured from: lips  Number of attempts at approach: 1

## 2025-06-19 RX ORDER — CEFAZOLIN SODIUM IN 0.9 % NACL 3 G/100 ML
3 INTRAVENOUS SOLUTION, PIGGYBACK (ML) INTRAVENOUS ONCE
Status: CANCELLED | OUTPATIENT
Start: 2025-06-19 | End: 2025-06-19

## 2025-06-30 LAB
CARBONATE APATITE: 20 %
MG NH4 PO4 (STRUVITE): 80 %
WEIGHT-STONE: 92 MG

## 2025-06-30 NOTE — PROGRESS NOTES
Stone analysis: 80% struvite, 20% carbonate appetite    Future Appointments  7/24/2025  3:30 PM    Kip Ann MD           [id number]           [facility]  9/29/2025  1:30 PM    Kip Ann MD           [id number]            Nap 4

## 2025-07-07 ENCOUNTER — TELEPHONE (OUTPATIENT)
Dept: SURGERY | Facility: CLINIC | Age: 55
End: 2025-07-07

## 2025-07-07 RX ORDER — SULFAMETHOXAZOLE AND TRIMETHOPRIM 800; 160 MG/1; MG/1
1 TABLET ORAL 2 TIMES DAILY
Qty: 14 TABLET | Refills: 0 | Status: SHIPPED | OUTPATIENT
Start: 2025-07-07 | End: 2025-07-14

## 2025-07-07 NOTE — TELEPHONE ENCOUNTER
Can someone call this morning and let her know to start Bactrim today for upcoming surgery on Wed? If culture comes back resistant to Bactrim tomorrow might need to postpone surgery.    Thanks  MB

## 2025-07-07 NOTE — TELEPHONE ENCOUNTER
This encounter is now closed.     RN called patient. Relayed recommendations and new order from Dr Ann. No answer. Left message.     \" Can someone call this morning and let her know to start Bactrim today for upcoming surgery on Wed? If culture comes back resistant to Bactrim tomorrow might need to postpone surgery. - Thanks, MB\"       Medication Detail  Medication Quantity Refills Start End   sulfamethoxazole-trimethoprim -160 MG Oral Tab per tablet 14 tablet 0 7/7/2025 7/14/2025   Sig:   Take 1 tablet by mouth 2 (two) times daily for 7 days.       Pharmacy  [id number] - [address], [phone], [phone]

## 2025-07-07 NOTE — TELEPHONE ENCOUNTER
Received warm transfer from call center - Patient returning call   Patient notified of below (re:  7/7/2025 7:23am note : Can someone call this morning and let her know to start Bactrim today for upcoming surgery on Wed? If culture comes back resistant to Bactrim tomorrow might need to postpone surgery.)    Patient notified of above/verbalizes understanding.  sulfamethoxazole-trimethoprim -160 MG Oral Tab per tablet 14 tablet 0 7/7/2025 7/14/2025   Sig:   Take 1 tablet by mouth 2 (two) times daily for 7 days.     Route:   Oral     Order #:   [id number]     Above script approved/authorized/escribed                   (Dr. Ann).  Patient notified of above/verbalizes understanding

## 2025-07-08 ENCOUNTER — TELEPHONE (OUTPATIENT)
Dept: SURGERY | Facility: CLINIC | Age: 55
End: 2025-07-08

## 2025-07-08 ENCOUNTER — ANESTHESIA EVENT (OUTPATIENT)
Dept: SURGERY | Facility: HOSPITAL | Age: 55
End: 2025-07-08
Payer: MEDICAID

## 2025-07-08 NOTE — TELEPHONE ENCOUNTER
This encounter is now closed.     RN made Dr Ann aware. Final cx result in.     At 1648, per Dr Ann, \"No, she should be good as long as she started the antibiotics yesterday.\"    RN called patient and updated. She started the antibiotic yesterday. She agreed to plans.

## 2025-07-08 NOTE — H&P
UROLOGY HISTORY & PHYSICAL      Kirstin Myers Patient Status:  Hospital Outpatient Surgery    3/24/1970 MRN [id number]   [facility] SURGERY Attending Kip Ann MD   Hosp Day # 0 PCP None Pcp       Chief Complaint:   Nephrolithiasis     HPI & Assessment:   Kirstin Myers is a 55 year old female with history of diabetes who was seen for nephrolithiasis.  She presented with left flank pain and signs of infection.  CT 2024 shows moderate to severe hydronephrosis related to a large left obstructing partial staghorn UPJ stone measuring 4.3 cm, as well as a 7 mm lower pole stone.  No right renal stones present.       Repeat CT scan after stent showed complete resolution of hydronephrosis, less parenchymal thinning than prior.  MAG3 renal scan on 3/22/2024 showed 25% function of the left kidney and 75% function of the right kidney.  Post-Lasix left T1/2 was 26 minutes on the left.     Given decent function of the left kidney I discussed her options again of PCNL versus nephrectomy.  I do think it is worth saving his kidney and perform PCNL given 25% function.  The obstruction noted on renal scan could be related to the large stone burden.     I brought her to the OR on 4/15/2024 for stent exchange.  Her stent was significantly encrusted and needed to use a stone  to treat a large stone within the bladder around the distal coil of the stent.  She was scheduled for PCNL a few weeks after that, but needed to cancel due to upper respiratory infection.    I brought her to the OR on 7/10/2024 for cystolitholapaxy of large distal stent coil stone, bladder fulguration, left PCNL.  She did well and was discharged on postoperative day 1.  Postoperative CT shows an 8 mm left lower pole stone fragment so I brought her back to the OR on 2024 for cystoscopy, left ureteroscopy, laser lithotripsy, stent exchange.  Stent removed in clinic 2024.     24-hour urine study shows slightly high urine  calcium, slightly high urine oxalate, slightly low urine pH, slightly high uric acid.  Patient plans to make dietary modifications and repeat 24-hour urine study in about 1 year.     She presented to the hospital 5/29/25 with flank pain and was found to have 1.5 cm RLP and 1.3 cm R renal pelvis stones.    I brought her to the OR on 6/18/2025 for right ureteroscopy, laser lithotripsy, stent placement.  The treated approximately 80% of her large stone burden.  She returns for second look ureteroscopy.     24 Hour Metabolic Urine Study Results     Volume: 2050 mL/day  Goal >2500  Sodium: 137 mmol/day  Goal <150  Calcium: 356 mg/day  Goal <250  Citrate: 596 mg/day  Goal >450  Oxalate: 59 mg/day  Goal <40  pH: 5.665  Goal 5.8 - 6.2 (>6 for UA stones, >7 for cystine stone)  Creatinine: 1884 mg/day  Normal 18-20 mg/kilogram/day (female)  Normal 20-22 mg/kilogram/day (male)  Uric acid: 990 mg/day  Goal <750  Cystinuria present? No     Stone analysis: 100% struvite   Serum calcium: 8.8    Urine culture on 7/5/2025 grew Proteus, sensitive to Bactrim.  She started Bactrim on 7/7/2025    Plan:   - OR for cystoscopy, right ureteroscopy, laser lithotripsy, stent exchange  - Informed consent obtained - risks and benefits explained, all questions answered       History:   Past Medical History[1]    Past Surgical History[2]    Family History[3]    Short Social Hx on File[4]    Medications (Active prior to today's visit):  Current Medications[5]    Allergies:  Allergies[6]    Review of Systems:   A comprehensive 10-point review of systems was completed.  Pertinent positives and negatives are noted in the the HPI.    Physical Exam:   Vital Signs:  Height 5' 9\" (1.753 m), weight 280 lb (127 kg), last menstrual period 04/01/2023.     CONSTITUTIONAL: Well developed, well nourished, in no acute distress  NEUROLOGIC: Alert and oriented  HEAD: Normocephalic, atraumatic  EYES: Sclera non-icteric  ENT: Hearing intact, moist mucous  membranes  NECK: No obvious goiter or masses  RESPIRATORY: Normal respiratory effort  SKIN: No evident rashes  ABDOMEN: Soft, non-tender, non-distended    Laboratory Data:  Lab Results   Component Value Date    WBC 11.4 (H) 06/01/2025    HGB 11.8 (L) 06/01/2025    .0 06/01/2025     Lab Results   Component Value Date     06/01/2025    K 4.5 06/01/2025     06/01/2025    CO2 22.0 06/01/2025    BUN 35 (H) 06/01/2025     (H) 06/01/2025    AST 28 05/31/2025    ALT 30 05/31/2025    TP 6.7 05/31/2025    ALB 3.5 05/31/2025    PHOS 3.5 05/30/2025    CA 9.2 06/01/2025    MG 2.2 05/30/2025       Urinalysis Results (last three years):  Recent Labs     01/22/24  0050 02/20/24  1209 03/05/24  1321 07/23/24  1120 09/03/24  1503 12/30/24  1839 03/18/25  1443 03/18/25  1509 05/29/25  1820   COLORUR Yellow  --   --   --   --  Yellow  --   --  Dark-Yellow   CLARITY Turbid*  --   --   --   --  Turbid*  --   --  Turbid*   SPECGRAVITY 1.020 1.025 1.025 1.020 1.020 1.014 1.015  --  1.025   PHURINE 6.5 6.0 7.0 6.5 7.0 7.5 7.0  --  7.0   PROUR 50*  --   --   --   --  20*  --   --  >=300*   GLUUR Normal  --   --   --   --  Normal  --   --  Negative   KETUR Negative  --   --   --   --  Negative  --   --  Trace*   BILUR Negative  --   --   --   --  Negative  --   --   --    BLOODURINE Negative  --   --   --   --  Trace*  --   --  Moderate*   NITRITE 2+* Negative Negative Negative Negative Negative Negative  --  Negative   UROBILINOGEN 3*  --   --   --   --  Normal  --   --  1.0*   LEUUR 500*  --   --   --   --  250*  --   --  Large*   WBCUR >50*  --   --   --   --  >50*  --  21-50* >50*  >50*   RBCUR 3-5*  --   --   --   --  6-10*  --  3-5* None Seen  None Seen   BACUR 3+*  --   --   --   --  1+*  --  None Seen 3+*  3+*       Urine Culture Results (last three years):  Lab Results   Component Value Date    URINECUL >100,000 CFU/ML Proteus mirabilis (A) 05/29/2025    URINECUL >100,000 CFU/ML Proteus mirabilis (A)  2025    URINECUL  2024     >100,000 cfu/ml Multiple species present- probable contamination.    URINECUL No Growth 2 Days 2024    URINECUL >100,000 CFU/ML Escherichia coli (A) 2024    URINECUL >100,000 CFU/ML Proteus mirabilis (A) 2024    URINECUL >100,000 CFU/ML Escherichia coli (A) 2024       PSA:  No results found for: \"PSA\", \"PERCENTPSA\", \"PSAS\", \"PSAULTRA\"     Imaging (last three days):  No results found.       I have personally reviewed all relevant medical records, labs, and imaging.     Kip Ann MD  Staff Urologist  [facility]  Office: [phone]             [1]   Past Medical History:   Calculus of kidney    Diabetes (HCC)    Renal disorder    Visual impairment    GLASSES   [2]   Past Surgical History:  Procedure Laterality Date          Cystoscopy,insert ureteral stent      7/10/24 LEFT percutaneous nephrolithotomy    Hernia surgery            Other surgical history  2024    kidney stone    Other surgical history  04/15/2024    stent exchange    Other surgical history  07/10/2024    NEPHROSTOMY TUBE IN  PLACE    Repair ing hernia,5+y/o,reducibl      Ventral hernia repair N/A 2016    Procedure: HERNIA VENTRAL REPAIR;  Surgeon: Vijay Randall MD;  Location:  MAIN OR   [3]   Family History  Problem Relation Age of Onset    Cancer Mother     Heart Disease Maternal Grandfather    [4]   Social History  Socioeconomic History    Marital status:    Tobacco Use    Smoking status: Every Day     Current packs/day: 1.00     Average packs/day: 1 pack/day for 30.0 years (30.0 ttl pk-yrs)     Types: Cigarettes     Passive exposure: Current   Vaping Use    Vaping status: Former   Substance and Sexual Activity    Alcohol use: Not Currently    Drug use: No     Social Drivers of Health     Food Insecurity: No Food Insecurity (2025)    NCSS - Food Insecurity     Worried About Running Out of Food in the Last Year: No     Ran Out of  Food in the Last Year: No   Transportation Needs: No Transportation Needs (5/29/2025)    NCSS - Transportation     Lack of Transportation: No   Housing Stability: Not At Risk (5/29/2025)    NCSS - Housing/Utilities     Has Housing: Yes     Worried About Losing Housing: No     Unable to Get Utilities: No   [5]   Current Outpatient Medications   Medication Sig Dispense Refill    sulfamethoxazole-trimethoprim -160 MG Oral Tab per tablet Take 1 tablet by mouth 2 (two) times daily for 7 days. 14 tablet 0    Amylase-Lipase-Protease (DIGESTIVE ENZYMES OR) Take 1 tablet by mouth in the morning.      phenazopyridine (PYRIDIUM) 100 MG Oral Tab Take 1 tablet (100 mg total) by mouth 3 (three) times daily as needed for Pain. This will turn your urine orange. 10 tablet 0    Ketorolac Tromethamine 10 MG Oral Tab Take 1 tablet (10 mg total) by mouth every 8 (eight) hours as needed for Pain (Use sparingly and with plenty of water). 10 tablet 0    VITAMIN E OR daily.      oxybutynin ER 10 MG Oral Tablet 24 Hr Take 1 tablet (10 mg total) by mouth at bedtime.      Glucose Blood (ONETOUCH VERIO) In Vitro Strip Test blood sugar daily (before breakfast or before dinner) 200 strip 3    Glucose Blood (ONETOUCH VERIO) In Vitro Strip Check blood sugar twice daily 200 strip 3    OneTouch Delica Lancets 30G Does not apply Misc 1 each 2 (two) times daily. 200 each 3    Blood Glucose Monitoring Suppl (ONETOUCH VERIO FLEX SYSTEM) w/Device Does not apply Kit 1 kit every morning before breakfast. Test blood sugar daily (before breakfast or before dinner) 1 kit 0    Lancets (ONETOUCH DELICA PLUS [id number]) Does not apply Misc 1 Lancet daily. Test blood sugar daily (before breakfast or before dinner) 100 each 0    Multiple Vitamins-Minerals (CENTRUM SILVER 50+WOMEN) Oral Tab Take 1 tablet by mouth in the morning.      NON FORMULARY Take 1 tablet by mouth in the morning. Product: Pure Synergy.      CINNAMON OR Take 1 tablet by mouth in the  morning.      NON FORMULARY Take 1 Scoop by mouth in the morning. Product: Ana powder.      cholecalciferol 50 MCG (2000 UT) Oral Cap Take 1 capsule (2,000 Units total) by mouth in the morning.      NON FORMULARY Take by mouth in the morning. Beet root powder.      Fluticasone Propionate 50 MCG/ACT Nasal Suspension 1 spray by Each Nare route daily as needed for Rhinitis or Allergies.      B Complex Vitamins (B COMPLEX OR) Take 1 tablet by mouth in the morning.      Bromelains (BROMELAIN OR) Take 1 tablet by mouth in the morning.      COLLAGEN OR Take 1 tablet by mouth in the morning.      Green Tea, Camillia sinensis, (GREEN TEA OR) Take 1 tablet by mouth in the morning.      Omega-3 Fatty Acids (OMEGA-3 FISH OIL OR) Take 1 capsule by mouth in the morning.      Probiotic Product (PROBIOTIC DAILY OR) Take 1 capsule by mouth in the morning.     [6] No Known Allergies

## 2025-07-08 NOTE — TELEPHONE ENCOUNTER
Patient got her urine test results and would like to know if surgery needs to be reschedule? Surgery is schedule for tomorrow in the Morning.Please advise

## 2025-07-09 ENCOUNTER — ANESTHESIA (OUTPATIENT)
Dept: SURGERY | Facility: HOSPITAL | Age: 55
End: 2025-07-09
Payer: MEDICAID

## 2025-07-09 ENCOUNTER — HOSPITAL ENCOUNTER (OUTPATIENT)
Facility: HOSPITAL | Age: 55
Setting detail: HOSPITAL OUTPATIENT SURGERY
Discharge: HOME | End: 2025-07-09
Attending: SURGERY | Admitting: SURGERY
Payer: MEDICAID

## 2025-07-09 ENCOUNTER — APPOINTMENT (OUTPATIENT)
Dept: GENERAL RADIOLOGY | Facility: HOSPITAL | Age: 55
End: 2025-07-09
Attending: SURGERY
Payer: MEDICAID

## 2025-07-09 VITALS
SYSTOLIC BLOOD PRESSURE: 143 MMHG | BODY MASS INDEX: 41.03 KG/M2 | HEIGHT: 69 IN | HEART RATE: 64 BPM | RESPIRATION RATE: 18 BRPM | WEIGHT: 277 LBS | TEMPERATURE: 97 F | DIASTOLIC BLOOD PRESSURE: 81 MMHG | OXYGEN SATURATION: 99 %

## 2025-07-09 DIAGNOSIS — N20.0 RIGHT RENAL STONE: ICD-10-CM

## 2025-07-09 LAB
GLUCOSE BLD-MCNC: 123 MG/DL (ref 70–99)
GLUCOSE BLD-MCNC: 131 MG/DL (ref 70–99)

## 2025-07-09 PROCEDURE — 52356 CYSTO/URETERO W/LITHOTRIPSY: CPT | Performed by: SURGERY

## 2025-07-09 PROCEDURE — 74420 UROGRAPHY RTRGR +-KUB: CPT | Performed by: SURGERY

## 2025-07-09 DEVICE — URETERAL STENT
Type: IMPLANTABLE DEVICE | Site: URETER | Status: FUNCTIONAL
Brand: ASCERTA™

## 2025-07-09 RX ORDER — HYDROCODONE BITARTRATE AND ACETAMINOPHEN 5; 325 MG/1; MG/1
2 TABLET ORAL ONCE AS NEEDED
Status: DISCONTINUED | OUTPATIENT
Start: 2025-07-09 | End: 2025-07-09

## 2025-07-09 RX ORDER — HYDROMORPHONE HYDROCHLORIDE 1 MG/ML
0.2 INJECTION, SOLUTION INTRAMUSCULAR; INTRAVENOUS; SUBCUTANEOUS EVERY 5 MIN PRN
Status: DISCONTINUED | OUTPATIENT
Start: 2025-07-09 | End: 2025-07-09

## 2025-07-09 RX ORDER — NICOTINE POLACRILEX 4 MG
30 LOZENGE BUCCAL
Status: DISCONTINUED | OUTPATIENT
Start: 2025-07-09 | End: 2025-07-09 | Stop reason: HOSPADM

## 2025-07-09 RX ORDER — NICOTINE POLACRILEX 4 MG
15 LOZENGE BUCCAL
Status: DISCONTINUED | OUTPATIENT
Start: 2025-07-09 | End: 2025-07-09 | Stop reason: HOSPADM

## 2025-07-09 RX ORDER — TAMSULOSIN HYDROCHLORIDE 0.4 MG/1
0.4 CAPSULE ORAL EVERY EVENING
Qty: 14 CAPSULE | Refills: 0 | Status: SHIPPED | OUTPATIENT
Start: 2025-07-09 | End: 2025-07-23

## 2025-07-09 RX ORDER — ROCURONIUM BROMIDE 10 MG/ML
INJECTION, SOLUTION INTRAVENOUS AS NEEDED
Status: DISCONTINUED | OUTPATIENT
Start: 2025-07-09 | End: 2025-07-09 | Stop reason: SURG

## 2025-07-09 RX ORDER — DEXAMETHASONE SODIUM PHOSPHATE 4 MG/ML
VIAL (ML) INJECTION AS NEEDED
Status: DISCONTINUED | OUTPATIENT
Start: 2025-07-09 | End: 2025-07-09 | Stop reason: SURG

## 2025-07-09 RX ORDER — ACETAMINOPHEN 500 MG
1000 TABLET ORAL ONCE
Status: DISCONTINUED | OUTPATIENT
Start: 2025-07-09 | End: 2025-07-09 | Stop reason: HOSPADM

## 2025-07-09 RX ORDER — HYDROMORPHONE HYDROCHLORIDE 1 MG/ML
0.6 INJECTION, SOLUTION INTRAMUSCULAR; INTRAVENOUS; SUBCUTANEOUS EVERY 5 MIN PRN
Status: DISCONTINUED | OUTPATIENT
Start: 2025-07-09 | End: 2025-07-09

## 2025-07-09 RX ORDER — ACETAMINOPHEN 500 MG
1000 TABLET ORAL ONCE AS NEEDED
Status: DISCONTINUED | OUTPATIENT
Start: 2025-07-09 | End: 2025-07-09

## 2025-07-09 RX ORDER — HYDROCODONE BITARTRATE AND ACETAMINOPHEN 5; 325 MG/1; MG/1
1 TABLET ORAL ONCE AS NEEDED
Status: DISCONTINUED | OUTPATIENT
Start: 2025-07-09 | End: 2025-07-09

## 2025-07-09 RX ORDER — IBUPROFEN 600 MG/1
600 TABLET, FILM COATED ORAL ONCE AS NEEDED
Status: DISCONTINUED | OUTPATIENT
Start: 2025-07-09 | End: 2025-07-09

## 2025-07-09 RX ORDER — IOPAMIDOL 612 MG/ML
INJECTION, SOLUTION INTRAVASCULAR AS NEEDED
Status: DISCONTINUED | OUTPATIENT
Start: 2025-07-09 | End: 2025-07-09 | Stop reason: HOSPADM

## 2025-07-09 RX ORDER — HYDROMORPHONE HYDROCHLORIDE 1 MG/ML
0.4 INJECTION, SOLUTION INTRAMUSCULAR; INTRAVENOUS; SUBCUTANEOUS EVERY 5 MIN PRN
Status: DISCONTINUED | OUTPATIENT
Start: 2025-07-09 | End: 2025-07-09

## 2025-07-09 RX ORDER — NALOXONE HYDROCHLORIDE 0.4 MG/ML
0.08 INJECTION, SOLUTION INTRAMUSCULAR; INTRAVENOUS; SUBCUTANEOUS AS NEEDED
Status: DISCONTINUED | OUTPATIENT
Start: 2025-07-09 | End: 2025-07-09

## 2025-07-09 RX ORDER — INSULIN ASPART 100 [IU]/ML
INJECTION, SOLUTION INTRAVENOUS; SUBCUTANEOUS ONCE
Status: DISCONTINUED | OUTPATIENT
Start: 2025-07-09 | End: 2025-07-09

## 2025-07-09 RX ORDER — MIDAZOLAM HYDROCHLORIDE 1 MG/ML
1 INJECTION INTRAMUSCULAR; INTRAVENOUS EVERY 5 MIN PRN
Status: DISCONTINUED | OUTPATIENT
Start: 2025-07-09 | End: 2025-07-09

## 2025-07-09 RX ORDER — ONDANSETRON 2 MG/ML
INJECTION INTRAMUSCULAR; INTRAVENOUS AS NEEDED
Status: DISCONTINUED | OUTPATIENT
Start: 2025-07-09 | End: 2025-07-09 | Stop reason: SURG

## 2025-07-09 RX ORDER — DEXTROSE MONOHYDRATE 25 G/50ML
50 INJECTION, SOLUTION INTRAVENOUS
Status: DISCONTINUED | OUTPATIENT
Start: 2025-07-09 | End: 2025-07-09 | Stop reason: HOSPADM

## 2025-07-09 RX ORDER — SODIUM CHLORIDE, SODIUM LACTATE, POTASSIUM CHLORIDE, CALCIUM CHLORIDE 600; 310; 30; 20 MG/100ML; MG/100ML; MG/100ML; MG/100ML
INJECTION, SOLUTION INTRAVENOUS CONTINUOUS
Status: DISCONTINUED | OUTPATIENT
Start: 2025-07-09 | End: 2025-07-09

## 2025-07-09 RX ORDER — SCOPOLAMINE 1 MG/3D
1 PATCH, EXTENDED RELEASE TRANSDERMAL ONCE
Status: DISCONTINUED | OUTPATIENT
Start: 2025-07-09 | End: 2025-07-09 | Stop reason: HOSPADM

## 2025-07-09 RX ORDER — METOCLOPRAMIDE HYDROCHLORIDE 5 MG/ML
INJECTION INTRAMUSCULAR; INTRAVENOUS AS NEEDED
Status: DISCONTINUED | OUTPATIENT
Start: 2025-07-09 | End: 2025-07-09 | Stop reason: SURG

## 2025-07-09 RX ORDER — ONDANSETRON 2 MG/ML
4 INJECTION INTRAMUSCULAR; INTRAVENOUS EVERY 6 HOURS PRN
Status: DISCONTINUED | OUTPATIENT
Start: 2025-07-09 | End: 2025-07-09

## 2025-07-09 RX ORDER — LIDOCAINE HYDROCHLORIDE 10 MG/ML
INJECTION, SOLUTION EPIDURAL; INFILTRATION; INTRACAUDAL; PERINEURAL AS NEEDED
Status: DISCONTINUED | OUTPATIENT
Start: 2025-07-09 | End: 2025-07-09 | Stop reason: SURG

## 2025-07-09 RX ORDER — PROCHLORPERAZINE EDISYLATE 5 MG/ML
5 INJECTION INTRAMUSCULAR; INTRAVENOUS EVERY 8 HOURS PRN
Status: DISCONTINUED | OUTPATIENT
Start: 2025-07-09 | End: 2025-07-09

## 2025-07-09 RX ORDER — ALBUTEROL SULFATE 0.83 MG/ML
2.5 SOLUTION RESPIRATORY (INHALATION) AS NEEDED
Status: DISCONTINUED | OUTPATIENT
Start: 2025-07-09 | End: 2025-07-09

## 2025-07-09 RX ORDER — MEPERIDINE HYDROCHLORIDE 25 MG/ML
12.5 INJECTION INTRAMUSCULAR; INTRAVENOUS; SUBCUTANEOUS AS NEEDED
Status: DISCONTINUED | OUTPATIENT
Start: 2025-07-09 | End: 2025-07-09

## 2025-07-09 RX ORDER — LABETALOL HYDROCHLORIDE 5 MG/ML
5 INJECTION, SOLUTION INTRAVENOUS EVERY 5 MIN PRN
Status: DISCONTINUED | OUTPATIENT
Start: 2025-07-09 | End: 2025-07-09

## 2025-07-09 RX ORDER — MIDAZOLAM HYDROCHLORIDE 1 MG/ML
INJECTION INTRAMUSCULAR; INTRAVENOUS AS NEEDED
Status: DISCONTINUED | OUTPATIENT
Start: 2025-07-09 | End: 2025-07-09 | Stop reason: SURG

## 2025-07-09 RX ADMIN — ROCURONIUM BROMIDE 50 MG: 10 INJECTION, SOLUTION INTRAVENOUS at 08:54:00

## 2025-07-09 RX ADMIN — LIDOCAINE HYDROCHLORIDE 50 MG: 10 INJECTION, SOLUTION EPIDURAL; INFILTRATION; INTRACAUDAL; PERINEURAL at 08:53:00

## 2025-07-09 RX ADMIN — SODIUM CHLORIDE, SODIUM LACTATE, POTASSIUM CHLORIDE, CALCIUM CHLORIDE: 600; 310; 30; 20 INJECTION, SOLUTION INTRAVENOUS at 10:11:00

## 2025-07-09 RX ADMIN — ONDANSETRON 4 MG: 2 INJECTION INTRAMUSCULAR; INTRAVENOUS at 09:00:00

## 2025-07-09 RX ADMIN — DEXAMETHASONE SODIUM PHOSPHATE 4 MG: 4 MG/ML VIAL (ML) INJECTION at 09:02:00

## 2025-07-09 RX ADMIN — METOCLOPRAMIDE HYDROCHLORIDE 10 MG: 5 INJECTION INTRAMUSCULAR; INTRAVENOUS at 09:42:00

## 2025-07-09 RX ADMIN — MIDAZOLAM HYDROCHLORIDE 2 MG: 1 INJECTION INTRAMUSCULAR; INTRAVENOUS at 08:49:00

## 2025-07-09 RX ADMIN — SODIUM CHLORIDE, SODIUM LACTATE, POTASSIUM CHLORIDE, CALCIUM CHLORIDE: 600; 310; 30; 20 INJECTION, SOLUTION INTRAVENOUS at 08:42:00

## 2025-07-09 NOTE — ANESTHESIA PROCEDURE NOTES
Airway  Date/Time: 7/9/2025 8:56 AM  Reason: elective    Airway not difficult    General Information and Staff   Patient location during procedure: OR  Anesthesiologist: Mike Levin MD  Resident/CRNA: Ana Marte CRNA  Performed: CRNA   Performed by: Ana Marte CRNA  Authorized by: Mike Levin MD        Indications and Patient Condition  Indications for airway management: anesthesia  Sedation level: deep      Preoxygenated: yesPatient position: ramp    Mask difficulty assessment: 3 - difficult mask (inadequate, unstable or two providers) +/- NMBA    Final Airway Details    Final airway type: endotracheal airway    Successful airway: ETT  Cuffed: yes   Successful intubation technique: Video laryngoscopy  Facilitating devices/methods: intubating stylet  Endotracheal tube insertion site: oral  Blade: GlideScope  Blade size: #3  ETT size (mm): 7.0    Cormack-Lehane Classification: grade I - full view of glottis  Placement verified by: capnometry   Measured from: lips  ETT to lips (cm): 23  Number of attempts at approach: 1    Additional Comments  Atraumatic intubation. Dentition and OP as per preop.

## 2025-07-09 NOTE — ANESTHESIA POSTPROCEDURE EVALUATION
[facility]    Kirstin Myers Patient Status:  Hospital Outpatient Surgery   Age/Gender 55 year old female MRN [id number]   Location [facility] SURGERY Attending Kip Ann MD   Hosp Day # 0 PCP None Pcp       Anesthesia Post-op Note    CYSTOSCOPY, RIGHT RETROGRADE PYELOGRAM, RIGHT URETEROSCOPY, LASER LITHOTRIPSY, STONE EXTRACTION, RIGHT URETERAL STENT PLACEMENT    Procedure Summary       Date: 07/09/25 Room / Location:  MAIN OR 07 /  MAIN OR    Anesthesia Start: 0842 Anesthesia Stop: 1018    Procedure: CYSTOSCOPY, RIGHT RETROGRADE PYELOGRAM, RIGHT URETEROSCOPY, LASER LITHOTRIPSY, STONE EXTRACTION, RIGHT URETERAL STENT PLACEMENT (Right: Urethra) Diagnosis:       Right renal stone      (Right renal stone [N20.0])    Surgeons: Kip Ann MD Anesthesiologist: Mike Levin MD    Anesthesia Type: general ASA Status: 3            Anesthesia Type: general    Vitals Value Taken Time   /68 07/09/25 10:19   Temp 97.0 07/09/25 10:19   Pulse 81 07/09/25 10:19   Resp 12 07/09/25 10:19   SpO2 96% 07/09/25 10:19           Patient Location: PACU    Anesthesia Type: general    Airway Patency: patent and extubated    Postop Pain Control: adequate    Mental Status: preanesthetic baseline    Nausea/Vomiting: none    Cardiopulmonary/Hydration status: stable euvolemic    Complications: no apparent anesthesia related complications    Postop vital signs: stable    Comments: Report to PACU VISH Amaro.    Dental Exam: Unchanged from Preop    Patient to be discharged from PACU when criteria met.

## 2025-07-09 NOTE — OPERATIVE REPORT
Urology Operative Note    Attending Surgeon: Kip Ann MD    Assistant Surgeon: None    Patient Name: Kirstin Myers    Date of Surgery: 7/9/2025    Preoperative Diagnosis: Right nephrolithiasis    Postoperative Diagnosis: Same    Procedure Performed:   Cystoscopy, RIGHT ureteroscopy, basket stone extraction, retrograde pyelogram, ureteral stent exchange    Indication:  Patient is a 55 year old female who presented with two large right renal stones, here for second look ureteroscopy. The patient was counseled on options, risks, and benefits and elected to undergo the above procedure. We discussed risks including, but not limited to, bleeding, infection, damage to surrounding structures, need for repeat procedure(s). The patient understood these risks and wished to proceed with surgery.    Findings:  Cystoscopy - normal urethra without strictures, normal bladder. Multiple remaining stone fragments basket extracted (20-30) clearing stone burden.    Procedure:  The patient was taken to the operating room and a timeout was performed confirming the correct patient and procedure. The patient was prepped and draped in lithotomy position after undergoing general anesthesia. Pre-operative prophylactic antibiotics were given in the form of Amp/Gent.    The cystoscope was inserted per urethra and the bladder was inspected and drained. The right ureteral stent was grasped and pulled to the urethral meatus with a cystoscopic grasper. The stent was cannulated with a Sensor wire, and the wire was passed into the renal pelvis which I confirmed using fluoroscopy. The stent was fully removed.    A 12/14 Malay ureteral access sheath was inserted over the first wire. It was advanced without resistance to the mid ureter. The inner cannula was removed and a second wire was inserted through the sheath and into the kidney, which was confirmed fluoroscopically.  The access sheath was removed and the second safety wire was secured to  the drape.  The access sheath was then reinserted over the first working wire up to the proximal ureter. The wire and inner cannula of the access sheath were removed, leaving the safety wire in place alongside the access sheath.     The flexible ureteroscope was advanced into the sheath and up into the renal pelvis. Stone(s) was/were seen in the upper and lower pole of the kidney. The stone fragments were basket extracted.  Any smaller remaining stone fragments were lasered to dust, and thoroughly irrigated. All renal calyces were surveyed once more, and no large fragments were seen. A retrograde pyelogram was performed through the scope and showed no extravasation. The ureter was inspected while slowly removing the scope and access sheath, and it appeared healthy without stone fragments seen.    A 6-Upper sorbian x 26 cm JJ ureteral stent was inserted over the remaining wire. The proximal coil was formed in the kidney under fluoroscopic guidance. The distal coil was formed within the bladder using the pusher and fluoroscopy. The stent string was removed prior to stent placement.    The bladder was drained and the cystoscope was removed. The patient was awoken from anesthesia and transferred to PACU in stable condition. The patient tolerated the procedure well. All instrument/supply counts were correct at the end of the case.    Specimens:   Stone fragments for chemical analysis    Estimated Blood Loss:  1 mL    Tubes/Drains:  6-Upper sorbian x 26 cm JJ right ureteral stent    Complications:   None immediate    Condition from OR:  Stable    Plan:   The patient will follow up in the office for stent removal in ~1 week.      Kip Ann MD  Staff Urologist  [facility]  Office: [phone]

## 2025-07-09 NOTE — ANESTHESIA PREPROCEDURE EVALUATION
PRE-OP EVALUATION    Patient Name: Kirstin Myers    Admit Diagnosis: Right renal stone [N20.0]    Pre-op Diagnosis: Right renal stone [N20.0]    CYSTOSCOPY, RIGHT RETROGRADE PYELOGRAM, RIGHT URETEROSCOPY, LASER LITHOTRIPSY, STONE EXTRACTION, RIGHT URETERAL STENT PLACEMENT    Anesthesia Procedure: CYSTOSCOPY, RIGHT RETROGRADE PYELOGRAM, RIGHT URETEROSCOPY, LASER LITHOTRIPSY, STONE EXTRACTION, RIGHT URETERAL STENT PLACEMENT (Right)    Surgeons and Role:     * Kip Ann MD - Primary    Pre-op vitals reviewed.        Body mass index is 41.35 kg/m².    Current medications reviewed.  Hospital Medications:  Current Medications[1]    Outpatient Medications:   Prescriptions Prior to Admission[2]    Allergies: Patient has no known allergies.      Anesthesia Evaluation    Patient summary reviewed.    Anesthetic Complications  (-) history of anesthetic complications         GI/Hepatic/Renal                                 Cardiovascular                (+) obesity                                       Endo/Other      (+) diabetes  type 2, not using insulin                         Pulmonary                           Neuro/Psych                                      Past Surgical History[3]  Social Hx on file[4]  History   Drug Use No     Available pre-op labs reviewed.  Lab Results   Component Value Date    WBC 11.4 (H) 06/01/2025    RBC 4.13 06/01/2025    HGB 11.8 (L) 06/01/2025    HCT 37.7 06/01/2025    MCV 91.3 06/01/2025    MCH 28.6 06/01/2025    MCHC 31.3 06/01/2025    RDW 16.0 06/01/2025    .0 06/01/2025     Lab Results   Component Value Date     06/01/2025    K 4.5 06/01/2025     06/01/2025    CO2 22.0 06/01/2025    BUN 35 (H) 06/01/2025    CREATSERUM 1.75 (H) 06/01/2025     (H) 06/01/2025    CA 9.2 06/01/2025            Airway      Mallampati: II  Mouth opening: >3 FB  TM distance: > 6 cm  Neck ROM: full Cardiovascular    Cardiovascular exam normal.         Dental    Dentition appears  grossly intact         Pulmonary    Pulmonary exam normal.                 Other findings              ASA: 3   Plan: general                             Present on Admission:  **None**             [1]    [COMPLETED] ceFAZolin (Ancef) 3 g in sodium chloride 0.9% 100mL IVPB premix  3 g Intravenous Once   [2]   No medications prior to admission.   [3]   Past Surgical History:  Procedure Laterality Date          Cystoscopy,insert ureteral stent      7/10/24 LEFT percutaneous nephrolithotomy    Hernia surgery            Other surgical history  2024    kidney stone    Other surgical history  04/15/2024    stent exchange    Other surgical history  07/10/2024    NEPHROSTOMY TUBE IN  PLACE    Repair ing hernia,5+y/o,reducibl      Ventral hernia repair N/A 2016    Procedure: HERNIA VENTRAL REPAIR;  Surgeon: Vijay Randall MD;  Location:  MAIN OR   [4]   Social History  Socioeconomic History    Marital status:    Tobacco Use    Smoking status: Every Day     Current packs/day: 1.00     Average packs/day: 1 pack/day for 30.0 years (30.0 ttl pk-yrs)     Types: Cigarettes     Passive exposure: Current   Vaping Use    Vaping status: Former   Substance and Sexual Activity    Alcohol use: Not Currently    Drug use: No

## 2025-07-09 NOTE — DISCHARGE INSTRUCTIONS
Instructions:    - No heavy lifting or strenuous activity for 1 day. You may resume regular activity tomorrow.  With increased activity you may notice more blood in the urine from stent movement inside the bladder.    - Your follow-up appointment is listed below. Please contact us at [phone] if you need to change your appointment.    Your appointments       Date & Time Appointment Department ([place])    Jul 24, 2025 3:30 PM CDT Procedure with Kip Ann MD [facility] ([facility] [place] [facility])        Sep 29, 2025 1:30 PM CDT Follow Up Visit with Kip Ann MD [facility] ([facility] [place] [facility])              [facility]  [address]  [facility] [id number] [phone]           - You have a stent (small plastic tube) inside your kidney and ureter to allow the swelling from surgery to resolve. This is only temporary and must be removed, so you should not forget about it. We will remove your stent via a brief cystoscopy in clinic when you return. If you have to miss this appointment for some reason please make sure you re-schedule it.     - With a ureteral stent in place you may have some discomfort on your side/flank. You can feel pain worsen when you urinate, so try to avoid holding urine and void every 2-3 hours. You also may notice increased blood in the urine as you increase your activity. If this happens increase your hydration and take it easy for a day. Warm baths/hot packs can help with the discomfort as well as Advil/Motrin (if you are able to take these).     - You may experience mild pain after the procedure for a few days.  If the pain becomes intolerable please contact our office or go to the nearest Emergency Room or Urgent Care. You should take over the counter ibuprofen (AKA motrin, advil) for mild pain (provided you do not have a  medical condition such as stomach ulcers or kidney disease which prohibits you from taking these). You may alternate this with tylenol as well.     - Warm packs over the lower abdomen or hot baths often help with discomfort after cystoscopy.      - You may experience burning and frequency of urination over the next few days. This will improve after a few days if you stay well hydrated.      - You are likely to see some blood in your urine (pink or light red urine) that should clear up within a few days. Staying well hydrated should help this clear up. If you notice the urine stays dark red or there are multiple large blood clots despite good hydration, please call the urology clinic ([phone]).     - Try to abstain from alcohol, coffee, tea, artificial sweeteners, and spicy food for the next 48 hours as these can irritate the bladder.     - If you develop fevers / chills, difficulty urinating, or abdominal pain that does not improve with pain medications, please call the office.     - Drink 1.5 to 2 liters of fluid today (water is preferable). If you are on a fluid restriction due to other medical reasons then you need to adhere to your fluid restriction recommendations.    Kip Ann MD  Staff Urologist  [facility]  Office: [phone]

## 2025-07-21 LAB
CARBONATE APATITE: 10 %
MG NH4 PO4 (STRUVITE): 90 %
WEIGHT-STONE: 119 MG

## 2025-07-24 ENCOUNTER — PROCEDURE (OUTPATIENT)
Dept: SURGERY | Facility: CLINIC | Age: 55
End: 2025-07-24

## 2025-07-24 DIAGNOSIS — N20.0 NEPHROLITHIASIS: Primary | ICD-10-CM

## 2025-07-24 LAB
BILIRUBIN: NEGATIVE
GLUCOSE (URINE DIPSTICK): NEGATIVE MG/DL
KETONES (URINE DIPSTICK): NEGATIVE MG/DL
MULTISTIX LOT#: ABNORMAL NUMERIC
NITRITE, URINE: NEGATIVE
PH, URINE: 7 (ref 4.5–8)
PROTEIN (URINE DIPSTICK): 30 MG/DL
SPECIFIC GRAVITY: 1.01 (ref 1–1.03)
URINE-COLOR: YELLOW
UROBILINOGEN,SEMI-QN: 0.2 MG/DL (ref 0–1.9)

## 2025-07-24 PROCEDURE — 81003 URINALYSIS AUTO W/O SCOPE: CPT | Performed by: SURGERY

## 2025-07-24 PROCEDURE — 52310 CYSTOSCOPY AND TREATMENT: CPT | Performed by: SURGERY

## 2025-07-24 RX ORDER — SULFAMETHOXAZOLE AND TRIMETHOPRIM 800; 160 MG/1; MG/1
1 TABLET ORAL ONCE
Status: COMPLETED | OUTPATIENT
Start: 2025-07-24 | End: 2025-07-24

## 2025-07-24 RX ADMIN — SULFAMETHOXAZOLE AND TRIMETHOPRIM 1 TABLET: 800; 160 TABLET ORAL at 15:16:00

## 2025-07-24 NOTE — PROCEDURES
Clinic Procedure Note    INDICATIONS:   Kirstin Myers is a 55 year old female with history of diabetes who was seen for nephrolithiasis.  She presented with left flank pain and signs of infection.  CT 1/22/2024 shows moderate to severe hydronephrosis related to a large left obstructing partial staghorn UPJ stone measuring 4.3 cm, as well as a 7 mm lower pole stone.  No right renal stones present.       Repeat CT scan after stent showed complete resolution of hydronephrosis, less parenchymal thinning than prior.  MAG3 renal scan on 3/22/2024 showed 25% function of the left kidney and 75% function of the right kidney.  Post-Lasix left T1/2 was 26 minutes on the left.     Given decent function of the left kidney I discussed her options again of PCNL versus nephrectomy.  I do think it is worth saving his kidney and perform PCNL given 25% function.  The obstruction noted on renal scan could be related to the large stone burden.     I brought her to the OR on 4/15/2024 for stent exchange.  Her stent was significantly encrusted and needed to use a stone  to treat a large stone within the bladder around the distal coil of the stent.  She was scheduled for PCNL a few weeks after that, but needed to cancel due to upper respiratory infection.     I brought her to the OR on 7/10/2024 for cystolitholapaxy of large distal stent coil stone, bladder fulguration, left PCNL.  She did well and was discharged on postoperative day 1.  Postoperative CT shows an 8 mm left lower pole stone fragment so I brought her back to the OR on 7/16/2024 for cystoscopy, left ureteroscopy, laser lithotripsy, stent exchange.  Stent removed in clinic 7/23/2024.     24-hour urine study shows slightly high urine calcium, slightly high urine oxalate, slightly low urine pH, slightly high uric acid.  Patient plans to make dietary modifications and repeat 24-hour urine study in about 1 year.     She presented to the hospital 5/29/25 with flank pain  and was found to have 1.5 cm RLP and 1.3 cm R renal pelvis stones.     I brought her to the OR on 2025 for right ureteroscopy, laser lithotripsy, stent placement.  The treated approximately 80% of her large stone burden.  OR 2025 for second look right ureteroscopy.  Multiple remaining stone fragments basket extracted.     24 Hour Metabolic Urine Study Results     Volume: 2050 mL/day  Goal >2500  Sodium: 137 mmol/day  Goal <150  Calcium: 356 mg/day  Goal <250  Citrate: 596 mg/day  Goal >450  Oxalate: 59 mg/day  Goal <40  pH: 5.665  Goal 5.8 - 6.2 (>6 for UA stones, >7 for cystine stone)  Creatinine: 1884 mg/day  Normal 18-20 mg/kilogram/day (female)  Normal 20-22 mg/kilogram/day (male)  Uric acid: 990 mg/day  Goal <750  Cystinuria present? No     Stone analysis: 100% struvite   Serum calcium: 8.8    PROCEDURE:       1. Flexible cystoscopy, removal of right ureteral stent (43215)    DATE OF PROCEDURE: 2025     PRE-PROCEDURE DIAGNOSIS: Nephrolithiasis    POST-PROCEDURE DIAGNOSIS: Same     SURGEON: Kip Ann MD    FINDINGS:  Stent successfully removed in its entirety.     PROCEDURE:   Patient was brought to the procedure suite and a time-out was performed identifiying the patient,  and procedure to be performed. The risks and benefits of the procedure were once again discussed with the patient including bleeding, infection, and dysuria. The patient agreed to proceed. The patient did not have any signs or symptoms of active UTI. Prophylactic PO antibiotics were given in clinic.    The patient was placed in supine position on the table and the genital area was prepped and draped in the standard sterile fashion. Urojet was used prior for local anesthesia effect. A flexible cystoscope was inserted per urethra. There were no urethral strictures present. The bladder was entered and the distal coil of the stent was seen protruding from the ureteral orifice. The stent was grasped with the flexible stent  grasper, and then the stent and cystoscope were both removed. The stent was visualized outside the body and confirmed to be intact and fully removed.     There were no complications and the patient tolerated the procedure well.    IMPRESSION:  Successful stent removal after ureteroscopy today.    PLAN:   -Renal/bladder ultrasound in 6 weeks to ensure no silent hydronephrosis  -24 hour urine study for metabolic workup      Kip Ann MD  Staff Urologist  [facility]  Office: [phone]

## 2025-07-29 ENCOUNTER — TELEPHONE (OUTPATIENT)
Dept: SURGERY | Facility: CLINIC | Age: 55
End: 2025-07-29

## 2025-08-20 ENCOUNTER — TELEPHONE (OUTPATIENT)
Dept: SURGERY | Facility: CLINIC | Age: 55
End: 2025-08-20

## 2025-08-20 DIAGNOSIS — R82.90 URINE FINDING: Primary | ICD-10-CM

## (undated) DEVICE — SOLUTION IRRIG 1000ML 0.9% NACL USP BTL

## (undated) DEVICE — NITINOL STONE RETRIEVAL BASKET: Brand: ZERO TIP

## (undated) DEVICE — ZIPWIRE GUIDEWIRE .038X150 STR

## (undated) DEVICE — SYSTEM PUMPING SINGLE ACTION

## (undated) DEVICE — SYRINGE MED 20ML STD CLR PLAS LL TIP N CTRL

## (undated) DEVICE — SLEEVE COMPR MD KNEE LEN SGL USE KENDALL SCD

## (undated) DEVICE — STERILE POLYISOPRENE POWDER-FREE SURGICAL GLOVES: Brand: PROTEXIS

## (undated) DEVICE — SYRINGE MED 10ML LL TIP W/O SFTY DISP

## (undated) DEVICE — ADAPTER BX SEAL PRT ADJ FOR HYSTEROSCOPE

## (undated) DEVICE — URETERAL ACCESS SHEATH SET: Brand: NAVIGATOR HD

## (undated) DEVICE — SINGLE-USE DIGITAL FLEXIBLE URETEROSCOPE: Brand: LITHOVUE

## (undated) DEVICE — RETRIEVAL DEPLOYMENT DEVICE: Brand: LITHOVUE EMPOWER™

## (undated) DEVICE — Device

## (undated) DEVICE — GLOVE SUR 7 SENSICARE PI PIP CRM PWD F

## (undated) DEVICE — SET IRRIG L96IN POST OP W/ NVENT 2ND PIERCING

## (undated) DEVICE — SOLUTION IRRIG 3000ML 0.9% NACL FLX CONT

## (undated) DEVICE — NITINOL WIRE WITH HYDROPHILIC TIP: Brand: SENSOR

## (undated) DEVICE — C-ARM: Brand: UNBRANDED

## (undated) DEVICE — GUIDEWIRE: Brand: AMPLATZ SUPER STIFF

## (undated) DEVICE — SEAL BIOPSY PORT ACMI BX BLACK CAP

## (undated) DEVICE — PERCUTANEUOS NEPHROLOGY CDS: Brand: MEDLINE INDUSTRIES, INC.

## (undated) DEVICE — SUT ETHLN 3-0 18IN PS-2 NABSRB BLK 19MM 3/8 C

## (undated) DEVICE — DUAL LUMEN URETERAL CATHETER

## (undated) DEVICE — SINGLE ACTION PUMPING SYSTEM

## (undated) DEVICE — PERCUTANEOUS ACCESS NEEDLE: Brand: NAVIGUIDE

## (undated) DEVICE — SLEEVE COMPR M KNEE LEN SGL USE KENDALL SCD

## (undated) DEVICE — ADAPTER BX SEAL Y BIOPSY PORT ADJ FOR HYSTEROSCOPE

## (undated) DEVICE — CATHETER URET 5FR L70CM FLX OPN TIP NONPORTED

## (undated) DEVICE — SYRINGE IRRIG 60ML TOOM TIP BOLD 2 GRAD RIG

## (undated) DEVICE — SIDE ARM ADAPTER: Brand: COOK

## (undated) DEVICE — PLUG ADPT 0.045ML L2.652CM NDLLSS M LL

## (undated) DEVICE — HF-RESECTION ELECTRODE PLASMALOOP LOOP, MEDIUM, 24 FR., 12°/16°, ESG TURIS: Brand: OLYMPUS

## (undated) DEVICE — 3M™ MEDIPORE™ H SOFT CLOTH SURGICAL TAPE 2864, 4 INCH X 10 YARD (10CM X 9,14M), 12 ROLLS/CASE: Brand: 3M™ MEDIPORE™

## (undated) DEVICE — PACK PBDS CYSTOSCOPY

## (undated) DEVICE — DRAPE OB GYN W100XL105IN PCH W24XL18IN

## (undated) DEVICE — KIT LITHO PRB 3.9X440MM W/ STONE CTCH

## (undated) DEVICE — 3M™ TEGADERM™ TRANSPARENT FILM DRESSING FRAME STYLE, 1624W, 2-3/8 IN X 2-3/4 IN (6 CM X 7 CM), 100/CT 4CT/CASE: Brand: 3M™ TEGADERM™

## (undated) DEVICE — ADHESIVE SKIN TOP FOR WND CLSR DERMBND ADV

## (undated) DEVICE — HIGH PRESSURE NEPHROSTOMY BALLOON CATHETER KIT: Brand: NEPHROMAX KIT

## (undated) DEVICE — VIOLET BRAIDED (POLYGLACTIN 910), SYNTHETIC ABSORBABLE SUTURE: Brand: COATED VICRYL

## (undated) DEVICE — SKN PREP SPNG STKS PVP PNT STR: Brand: MEDLINE INDUSTRIES, INC.

## (undated) DEVICE — PREMIUM WET SKIN PREP TRAY: Brand: MEDLINE INDUSTRIES, INC.

## (undated) DEVICE — SOLUTION IRRIG 1000ML ST H2O AQUALITE PLAS

## (undated) DEVICE — PAD,ABDOMINAL,8"X7.5",ST,LF,20/BX: Brand: MEDLINE INDUSTRIES, INC.

## (undated) DEVICE — SET CYSTO IRRIG L77IN DIA0.241IN BLDR NVENT

## (undated) DEVICE — CYSTO CDS-LF: Brand: MEDLINE INDUSTRIES, INC.

## (undated) DEVICE — SEAL BIOPSY PORT ACMI

## (undated) DEVICE — GLOVE SUR 7.5 SENSICARE SHLD PIP BLK PWD F

## (undated) DEVICE — FIBER LSR 200UM 2J 80HZ 60W DL FOR LITHO

## (undated) DEVICE — 20 ML SYRINGE LUER-LOCK TIP: Brand: MONOJECT

## (undated) DEVICE — MEDI-VAC NON-CONDUCTIVE SUCTION TUBING: Brand: CARDINAL HEALTH

## (undated) DEVICE — SINGLE-USE DIGITAL FLEXIBLE URETEROSCOPE: Brand: LITHOVUE™ ELITE

## (undated) DEVICE — 47" (119 CM) APPX 5.4 ML, EXT SET W/4-WAY STOPCOCK, BCV-CLAVE®, CLAVE®, CLAMP, LUER LOCK, 1 EXT: Brand: ICU MEDICAL

## (undated) DEVICE — SUT PERMA- 0 30IN SH NABSRB BLK 26MM 1/2 C

## (undated) DEVICE — TIGERTAIL 5F FLXTIP 70CM

## (undated) NOTE — LETTER
[facility] Pre-Admission Testing Department  Phone: [phone]  OUTSIDE TESTING RESULT REQUEST      TO:   Dr.Mark Ann Today's Date: 24    FAX #:[phone]     IMPORTANT: FOR YOUR IMMEDIATE ATTENTION  Please FAX all test results listed below to: [phone]     Testing already done on or about:Patient requests having testing at [facility]    * * * * If testing is NOT complete, arrange with patient A.S.A.P. * * * *      Patient Name: Kirstin Myers  Surgery Date: 7/10/2024    CSN: [id number]  Medical Record: [id number]   : 3/24/1970 - A: 54 y      Sex: female  Surgeon(s):  Kip Ann MD  Procedure: LEFT PERCUTANEOUS NEPHROLITHOTOMY, POSSIBLE LEFT URETEROSCOPY, URETERAL LEFT STENT EXCHANGE  Anesthesia Type: General     Surgeon: Kip Ann MD       The following Testing and Time Line are REQUIRED PER ANESTHESIA     CBC [with Differential & Platelets] within  30 days  PT/INR within  30 days  Urine C&S within  14 days      Thank You,   Sent by:VISH Love  preAdmission testing      CONFIDENTIALITY NOTICE  This transmission is intended only for the use of the individual or entity to which it is addressed and may contain information that is privileged and confidential.  If the reader of this message is not the intended recipient, you are hereby notified that any disclosure, distribution, or copying of this information is strictly prohibited.  If you have received this transmission in error, please notify us immediately by telephone, and return the original documents to us at the address listed above.

## (undated) NOTE — LETTER
OUTSIDE TESTING RESULT REQUEST     IMPORTANT: FOR YOUR IMMEDIATE ATTENTION  Please FAX all test results listed below to: [phone]            * * * * If testing is NOT complete, arrange with patient A.S.A.P. * * * *      Patient Name: Kirstin Myers  Surgery Date: 4/15/2024  Medical Record: [id number]  CSN: [id number]  : 3/24/1970 - A: 54 y     Sex: female  Surgeon(s):  Kip Ann MD  Procedure: CYSTOSCOPY, LEFT RETROGRADE PYELOGRAM WITH  URETERAL STENT EXCHANGE  Anesthesia Type: General     Surgeon: Kip Ann MD     The following Testing and Time Line are REQUIRED PER ANESTHESIA     Urine C&S within  14 days      Thank You,   Sent by:Re WAHL

## (undated) NOTE — LETTER
OUTSIDE TESTING RESULT REQUEST     IMPORTANT: FOR YOUR IMMEDIATE ATTENTION  Please FAX all test results listed below to: [phone]     Testing already done on or about: Pateint requests testing at [facility]     * * * * If testing is NOT complete, arrange with patient A.S.A.P. * * * *      Patient Name: Kirstin Myers  Surgery Date: 7/10/2024  Medical Record: [id number]  CSN: [id number]  : 3/24/1970 - A: 54 y     Sex: female  Surgeon(s):  Kip Ann MD  Procedure: LEFT PERCUTANEOUS NEPHROLITHOTOMY, POSSIBLE URETEROSCOPY, URETERAL STENT EXCHANGE  Anesthesia Type: General     Surgeon: Kip Ann MD     The following Testing and Time Line are REQUIRED PER ANESTHESIA     CBC [with Differential & Platelets] within  30 days  PT/INR within  30 days  Urine C&S within  14 days      Thank You,   Sent by:VISH Love  PreADmission Testing

## (undated) NOTE — LETTER
[address]  [address]  Authorization for Surgical Operation and Procedure     Date:___________                                                                                                         Time:__________  I hereby authorize Surgeon(s):  Jason Pena MD, my physician and his/her assistants (if applicable), which may include medical students, residents, and/or fellows, to perform the following surgical operation/ procedure and administer such anesthesia as may be determined necessary by my physician:  Operation/Procedure name (s) Procedure(s):  CYSTOSCOPY, LEFT RETROGRADE, PYELOGRAM, INSERTION OF LEFT URETERAL STENT on Kirstin Myers   2.   I recognize that during the surgical operation/procedure, unforeseen conditions may necessitate additional or different procedures than those listed above.  I, therefore, further authorize and request that the above-named surgeon, assistants, or designees perform such procedures as are, in their judgment, necessary and desirable.    3.   My surgeon/physician has discussed prior to my surgery the potential benefits, risks and side effects of this procedure; the likelihood of achieving goals; and potential problems that might occur during recuperation.  They also discussed reasonable alternatives to the procedure, including risks, benefits, and side effects related to the alternatives and risks related to not receiving this procedure.  I have had all my questions answered and I acknowledge that no guarantee has been made as to the result that may be obtained.    4.   Should the need arise during my operation/procedure, which includes change of level of care prior to discharge, I also consent to the administration of blood and/or blood products.  Further, I understand that despite careful testing and screening of blood or blood products by collecting agencies, I may still be subject to ill effects as a result of receiving a blood  transfusion and/or blood products.  The following are some, but not all, of the potential risks that can occur: fever and allergic reactions, hemolytic reactions, transmission of diseases such as Hepatitis, AIDS and Cytomegalovirus (CMV) and fluid overload.  In the event that I wish to have an autologous transfusion of my own blood, or a directed donor transfusion, I will discuss this with my physician.  Check only if Refusing Blood or Blood Products  I understand refusal of blood or blood products as deemed necessary by my physician may have serious consequences to my condition to include possible death. I hereby assume responsibility for my refusal and release the hospital, its personnel, and my physicians from any responsibility for the consequences of my refusal.          o  Refuse      5.   I authorize the use of any specimen, organs, tissues, body parts or foreign objects that may be removed from my body during the operation/procedure for diagnosis, research or teaching purposes and their subsequent disposal by hospital authorities.  I also authorize the release of specimen test results and/or written reports to my treating physician on the hospital medical staff or other referring or consulting physicians involved in my care, at the discretion of the Pathologist or my treating physician.    6.   I consent to the photographing or videotaping of the operations or procedures to be performed, including appropriate portions of my body for medical, scientific, or educational purposes, provided my identity is not revealed by the pictures or by descriptive texts accompanying them.  If the procedure has been photographed/videotaped, the surgeon will obtain the original picture, image, videotape or CD.  The hospital will not be responsible for storage, release or maintenance of the picture, image, tape or CD.    7.   I consent to the presence of a  or observers in the operating room as deemed  necessary by my physician or their designees.    8.   I recognize that in the event my procedure results in extended X-Ray/fluoroscopy time, I may develop a skin reaction.    9. If I have a Do Not Attempt Resuscitation (DNAR) order in place, that status will be suspended while in the operating room, procedural suite, and during the recovery period unless otherwise explicitly stated by me (or a person authorized to consent on my behalf). The surgeon or my attending physician will determine when the applicable recovery period ends for purposes of reinstating the DNAR order.  10. Patients having a sterilization procedure: I understand that if the procedure is successful the results will be permanent and it will therefore be impossible for me to inseminate, conceive, or bear children.  I also understand that the procedure is intended to result in sterility, although the result has not been guaranteed.   11. I acknowledge that my physician has explained sedation/analgesia administration to me including the risk and benefits I consent to the administration of sedation/analgesia as may be necessary or desirable in the judgment of my physician.    I CERTIFY THAT I HAVE READ AND FULLY UNDERSTAND THE ABOVE CONSENT TO OPERATION and/or OTHER PROCEDURE.    _________________________________________  __________________________________  Signature of Patient     Signature of Responsible Person         ___________________________________         Printed Name of Responsible Person           _________________________________                 Relationship to Patient  _________________________________________  ______________________________  Signature of Witness          Date  Time      Patient Name: Kirstin Myers     : 3/24/1970                 Printed: 2024     Medical Record #: [id number]                     Page 1 of 2                                    [address]   [address]    Consent for Anesthesia    I, Kirstin Myers agree to be cared for by an anesthesiologist, who is specially trained to monitor me and give me medicine to put me to sleep or keep me comfortable during my procedure    I understand that my anesthesiologist is not an employee or agent of [facility] or [employer]. He or she works for [employer].    As the patient asking for anesthesia services, I agree to:  Allow the anesthesiologist (anesthesia doctor) to give me medicine and do additional procedures as necessary. Some examples are: Starting or using an “IV” to give me medicine, fluids or blood during my procedure, and having a breathing tube placed to help me breathe when I’m asleep (intubation). In the event that my heart stops working properly, I understand that my anesthesiologist will make every effort to sustain my life, unless otherwise directed by [facility] Do Not Resuscitate documents.  Tell my anesthesia doctor before my procedure:  If I am pregnant.  The last time that I ate or drank.  All of the medicines I take (including prescriptions, herbal supplements, and pills I can buy without a prescription (including street drugs/illegal medications). Failure to inform my anesthesiologist about these medicines may increase my risk of anesthetic complications.  If I am allergic to anything or have had a reaction to anesthesia before.  I understand how the anesthesia medicine will help me (benefits).  I understand that with any type of anesthesia medicine there are risks:  The most common risks are: nausea, vomiting, sore throat, muscle soreness, damage to my eyes, mouth, or teeth (from breathing tube placement).  Rare risks include: remembering what happened during my procedure, allergic reactions to medications, injury to my airway, heart, lungs, vision, nerves, or muscles and in extremely rare instances death.  My doctor has explained to me other choices  available to me for my care (alternatives).  Pregnant Patients (“epidural”):  I understand that the risks of having an epidural (medicine given into my back to help control pain during labor), include itching, low blood pressure, difficulty urinating, headache or slowing of the baby’s heart. Very rare risks include infection, bleeding, seizure, irregular heart rhythms and nerve injury.  Regional Anesthesia (“spinal”, “epidural”, & “nerve blocks”):  I understand that rare but potential complications include headache, bleeding, infection, seizure, irregular heart rhythms, and nerve injury.    I can change my mind about having anesthesia services at any time before I get the medicine.    _____________________________________________________________________________  Patient (or Representative) Signature/Relationship to Patient  Date   Time    _____________________________________________________________________________   Name (if used)    Language/Organization   Time    _____________________________________________________________________________  Anesthesiologist Signature     Date   Time  I have discussed the procedure and information above with the patient (or patient’s representative) and answered their questions. The patient or their representative has agreed to have anesthesia services.    _____________________________________________________________________________  Witness        Date   Time  I have verified that the signature is that of the patient or patient’s representative, and that it was signed before the procedure  Patient Name: Kirstin Myers     : 3/24/1970                 Printed: 2024     Medical Record #: [id number]                     Page 2 of 2

## (undated) NOTE — LETTER
03/18/25    Kirstin Myers      To Whom It May Concern:    This letter has been written at the patient's request. The above patient was seen at [facility] for treatment of a medical condition from January 2024    The patient may return to work/school on 3.19.2025 with normal duties.      Sincerely.    Dr. Marissa BOLAÑOS.

## (undated) NOTE — LETTER
OUTSIDE TESTING RESULT REQUEST     IMPORTANT: FOR YOUR IMMEDIATE ATTENTION  Please FAX all test results listed below to: [phone]         * * * * If testing is NOT complete, arrange with patient A.S.A.P. * * * *      Patient Name: Kirstin Myers  Surgery Date: 2025  Medical Record: [id number]  CSN: [id number]  : 3/24/1970 - A: 55 y     Sex: female  Surgeon(s):  Kip Ann MD  Procedure: CYSTOSCOPY, RIGHT RETROGRADE PYELOGRAM, RIGHT URETEROSCOPY, LASER LITHOTRIPSY, STONE EXTRACTION, RIGHT URETERAL STENT PLACEMENT  Anesthesia Type: General     Surgeon: Kip Ann MD     The following Testing and Time Line are REQUIRED PER ANESTHESIA     Urine C&S within  14 days      Thank You,   Sent by:Re WAHL    Patient does not have primary care doctor. Would like to have test done at [facility] for 23 surgery also

## (undated) NOTE — LETTER
[address]  [address]  Consent for Procedure/Sedation  Date: 7/10/2024         Time: 1000    I hereby authorize Dr. Sutherland, my physician and his/her assistants (if applicable), which may include medical students, residents, and/or fellows, to perform the following surgical operation/ procedure and administer such anesthesia as may be determined necessary by my physician: Preop Left nephrostomy tube and/or wire placement on Kirstin Myers  2.   I recognize that during the surgical operation/procedure, unforeseen conditions may necessitate additional or different procedures than those listed above.  I, therefore, further authorize and request that the above-named surgeon, assistants, or designees perform such procedures as are, in their judgment, necessary and desirable.    3.   My surgeon/physician has discussed prior to my surgery the potential benefits, risks and side effects of this procedure; the likelihood of achieving goals; and potential problems that might occur during recuperation.  They also discussed reasonable alternatives to the procedure, including risks, benefits, and side effects related to the alternatives and risks related to not receiving this procedure.  I have had all my questions answered and I acknowledge that no guarantee has been made as to the result that may be obtained.    4.   Should the need arise during my operation/procedure, which includes change of level of care prior to discharge, I also consent to the administration of blood and/or blood products.  Further, I understand that despite careful testing and screening of blood or blood products by collecting agencies, I may still be subject to ill effects as a result of receiving a blood transfusion and/or blood products.  The following are some, but not all, of the potential risks that can occur: fever and allergic reactions, hemolytic reactions, transmission of diseases such as Hepatitis, AIDS and  Cytomegalovirus (CMV) and fluid overload.  In the event that I wish to have an autologous transfusion of my own blood, or a directed donor transfusion, I will discuss this with my physician.   Check only if Refusing Blood or Blood Products  I understand refusal of blood or blood products as deemed necessary by my physician may have serious consequences to my condition to include possible death. I hereby assume responsibility for my refusal and release the hospital, its personnel, and my physicians from any responsibility for the consequences of my refusal.         o  Refuse         5.   I authorize the use of any specimen, organs, tissues, body parts or foreign objects that may be removed from my body during the operation/procedure for diagnosis, research or teaching purposes and their subsequent disposal by hospital authorities.  I also authorize the release of specimen test results and/or written reports to my treating physician on the hospital medical staff or other referring or consulting physicians involved in my care, at the discretion of the Pathologist or my treating physician.    6.   I consent to the photographing or videotaping of the operations or procedures to be performed, including appropriate portions of my body for medical, scientific, or educational purposes, provided my identity is not revealed by the pictures or by descriptive texts accompanying them.  If the procedure has been photographed/videotaped, the surgeon will obtain the original picture, image, videotape or CD.  The hospital will not be responsible for storage, release or maintenance of the picture, image, tape or CD.    7.   I consent to the presence of a  or observers in the operating room as deemed necessary by my physician or their designees.    8.   I recognize that in the event my procedure results in extended X-Ray/fluoroscopy time, I may develop a skin reaction.    9. If I have a Do Not Attempt Resuscitation  (DNAR) order in place, that status will be suspended while in the operating room, procedural suite, and during the recovery period unless otherwise explicitly stated by me (or a person authorized to consent on my behalf). The surgeon or my attending physician will determine when the applicable recovery period ends for purposes of reinstating the DNAR order.  10. Patients having a sterilization procedure: I understand that if the procedure is successful the results will be permanent and it will therefore be impossible for me to inseminate, conceive, or bear children.  I also understand that the procedure is intended to result in sterility, although the result has not been guaranteed.   11. I acknowledge that my physician has explained sedation/analgesia administration to me including the risk and benefits I consent to the administration of sedation/analgesia as may be necessary or desirable in the judgment of my physician.    I CERTIFY THAT I HAVE READ AND FULLY UNDERSTAND THE ABOVE CONSENT TO OPERATION and/or OTHER PROCEDURE.        ____________________________________       _________________________________      ______________________________  Signature of Patient         Signature of Responsible Person        Printed Name of Responsible Person        ____________________________________      _________________________________      ______________________________       Signature of Witness          Relationship to Patient                       Date                                       Time  Patient Name: Kirstin Myers  : 3/24/1970    Reviewed: 2024   Printed: July 10, 2024  Medical Record #: [id number] Page 1 of 1

## (undated) NOTE — LETTER
June 1, 2025    Patient: Kirstin Myers   Date of Visit: 5/29/2025       To Whom It May Concern:    Kirstin Myers was seen and treated at [facility]. Please excuse her daughter Lalo Lemus from work May 29-30, 2025 as she accompanied her mother during her stay.     If you have any questions or concerns, please don't hesitate to call.       Encounter Provider(s):    Chaitanya Hodges MD Injam, Meghana, MD

## (undated) NOTE — LETTER
OUTSIDE TESTING RESULT REQUEST     IMPORTANT: FOR YOUR IMMEDIATE ATTENTION  Please FAX all test results listed below to: [phone]     Testing already done on or about: asap     * * * * If testing is NOT complete, arrange with patient A.S.A.P. * * * *      Patient Name: Kirstin Myers  Surgery Date: 2024  Medical Record: [id number]  CSN: [id number]  : 3/24/1970 - A: 54 y     Sex: female  Surgeon(s):  Kip Ann MD  Procedure: LEFT PERCUTANEOUS NEPHROLITHOTOMY, POSSIBLE URETEROSCOPY URETERAL STENT EXCHANGE WITH IR (INTERVENTIONAL RADIOLOGY) ACCESS PRIOR AT 10:30AM  Anesthesia Type: General     Surgeon: Kip Ann MD     The following Testing and Time Line are REQUIRED PER ANESTHESIA     CBC [with Differential & Platelets] within  90 days  PT/INR within  30 days  Urine C&S within  14 days      Thank You,   Sent by:PAT    Patient states that she does not have a primary doctor and that Surgeon does her lab work.

## (undated) NOTE — LETTER
OUTSIDE TESTING RESULT REQUEST     IMPORTANT: FOR YOUR IMMEDIATE ATTENTION  Please FAX all test results listed below to: [phone]          * * * * If testing is NOT complete, arrange with patient A.S.A.P. * * * *      Patient Name: Kirstin Myers  Surgery Date: 2025  Medical Record: [id number]  CSN: [id number]  : 3/24/1970 - A: 55 y     Sex: female  Surgeon(s):  Kip Ann MD  Procedure: CYSTOSCOPY, RIGHT RETROGRADE PYELOGRAM, RIGHT URETEROSCOPY, LASER LITHOTRIPSY, STONE EXTRACTION, RIGHT URETERAL STENT PLACEMENT  Anesthesia Type: General     Surgeon: Kip Ann MD     The following Testing and Time Line are REQUIRED PER ANESTHESIA     Urine C&S within  14 days- patient does not have a primary care doctor. Would like it done at [facility]      Thank You,   Sent by:Re WAHL